# Patient Record
Sex: FEMALE | Race: WHITE | NOT HISPANIC OR LATINO | Employment: OTHER | URBAN - METROPOLITAN AREA
[De-identification: names, ages, dates, MRNs, and addresses within clinical notes are randomized per-mention and may not be internally consistent; named-entity substitution may affect disease eponyms.]

---

## 2024-06-09 ENCOUNTER — HOSPITAL ENCOUNTER (EMERGENCY)
Facility: HOSPITAL | Age: 69
Discharge: HOME | End: 2024-06-09
Payer: COMMERCIAL

## 2024-06-09 VITALS
HEART RATE: 89 BPM | OXYGEN SATURATION: 98 % | WEIGHT: 166 LBS | HEIGHT: 66 IN | SYSTOLIC BLOOD PRESSURE: 166 MMHG | DIASTOLIC BLOOD PRESSURE: 96 MMHG | RESPIRATION RATE: 18 BRPM | BODY MASS INDEX: 26.68 KG/M2

## 2024-06-09 DIAGNOSIS — K04.7 DENTAL INFECTION: ICD-10-CM

## 2024-06-09 DIAGNOSIS — K02.9 PAIN DUE TO DENTAL CARIES: Primary | ICD-10-CM

## 2024-06-09 PROCEDURE — 2500000001 HC RX 250 WO HCPCS SELF ADMINISTERED DRUGS (ALT 637 FOR MEDICARE OP)

## 2024-06-09 PROCEDURE — 99283 EMERGENCY DEPT VISIT LOW MDM: CPT

## 2024-06-09 RX ORDER — AMOXICILLIN AND CLAVULANATE POTASSIUM 875; 125 MG/1; MG/1
1 TABLET, FILM COATED ORAL EVERY 12 HOURS
Qty: 14 TABLET | Refills: 0 | Status: SHIPPED | OUTPATIENT
Start: 2024-06-09 | End: 2024-06-16

## 2024-06-09 RX ORDER — OMEPRAZOLE 40 MG/1
40 CAPSULE, DELAYED RELEASE ORAL
COMMUNITY

## 2024-06-09 RX ORDER — ACETAMINOPHEN 325 MG/1
975 TABLET ORAL ONCE
Status: COMPLETED | OUTPATIENT
Start: 2024-06-09 | End: 2024-06-09

## 2024-06-09 RX ORDER — AMLODIPINE BESYLATE 5 MG/1
TABLET ORAL DAILY
COMMUNITY

## 2024-06-09 RX ORDER — LETROZOLE 2.5 MG/1
2.5 TABLET, FILM COATED ORAL DAILY
COMMUNITY

## 2024-06-09 RX ORDER — LIDOCAINE HYDROCHLORIDE 20 MG/ML
1.25 SOLUTION OROPHARYNGEAL ONCE
Status: COMPLETED | OUTPATIENT
Start: 2024-06-09 | End: 2024-06-09

## 2024-06-09 RX ADMIN — ACETAMINOPHEN 975 MG: 325 TABLET ORAL at 08:17

## 2024-06-09 RX ADMIN — LIDOCAINE HYDROCHLORIDE 1.25 ML: 20 SOLUTION ORAL at 08:17

## 2024-06-09 ASSESSMENT — PAIN SCALES - PAIN ASSESSMENT IN ADVANCED DEMENTIA (PAINAD)
NEGVOCALIZATION: OCCASIONAL MOAN/GROAN, LOW SPEECH, NEGATIVE/DISAPPROVING QUALITY
BREATHING: NORMAL
TOTALSCORE: 1
FACIALEXPRESSION: SMILING OR INEXPRESSIVE
CONSOLABILITY: NO NEED TO CONSOLE
BODYLANGUAGE: RELAXED
TOTALSCORE: MEDICATION (SEE MAR)

## 2024-06-09 ASSESSMENT — PAIN - FUNCTIONAL ASSESSMENT
PAIN_FUNCTIONAL_ASSESSMENT: 0-10
PAIN_FUNCTIONAL_ASSESSMENT: 0-10

## 2024-06-09 ASSESSMENT — LIFESTYLE VARIABLES
TOTAL SCORE: 0
EVER HAD A DRINK FIRST THING IN THE MORNING TO STEADY YOUR NERVES TO GET RID OF A HANGOVER: NO
HAVE PEOPLE ANNOYED YOU BY CRITICIZING YOUR DRINKING: NO
EVER FELT BAD OR GUILTY ABOUT YOUR DRINKING: NO
HAVE YOU EVER FELT YOU SHOULD CUT DOWN ON YOUR DRINKING: NO

## 2024-06-09 ASSESSMENT — PAIN SCALES - WONG BAKER: WONGBAKER_NUMERICALRESPONSE: HURTS EVEN MORE

## 2024-06-09 ASSESSMENT — PAIN SCALES - GENERAL
PAINLEVEL_OUTOF10: 8
PAINLEVEL_OUTOF10: 8

## 2024-06-09 ASSESSMENT — PAIN DESCRIPTION - LOCATION
LOCATION: TEETH
LOCATION: TEETH

## 2024-06-09 ASSESSMENT — PAIN DESCRIPTION - ORIENTATION: ORIENTATION: LEFT

## 2024-06-09 ASSESSMENT — COLUMBIA-SUICIDE SEVERITY RATING SCALE - C-SSRS
1. IN THE PAST MONTH, HAVE YOU WISHED YOU WERE DEAD OR WISHED YOU COULD GO TO SLEEP AND NOT WAKE UP?: NO
6. HAVE YOU EVER DONE ANYTHING, STARTED TO DO ANYTHING, OR PREPARED TO DO ANYTHING TO END YOUR LIFE?: NO
2. HAVE YOU ACTUALLY HAD ANY THOUGHTS OF KILLING YOURSELF?: NO

## 2024-06-09 ASSESSMENT — PAIN DESCRIPTION - PAIN TYPE: TYPE: ACUTE PAIN

## 2024-06-09 ASSESSMENT — PAIN DESCRIPTION - DESCRIPTORS: DESCRIPTORS: ACHING;THROBBING

## 2024-06-09 NOTE — DISCHARGE INSTRUCTIONS
Please take Augmentin twice daily for 7 days for treatment of your dental infection.  Definitive management can be pursued by following up with a dentist within 72 hours for treatment of your dental caries.  You may rotate Tylenol and ibuprofen for symptom relief.  Be sure to brush her teeth twice daily and avoid sugary foods and drinks to avoid tooth breakdown.  Present back to ER for any new onset or worsening of symptoms despite treatment.

## 2024-06-09 NOTE — ED PROVIDER NOTES
HPI   Chief Complaint   Patient presents with    Dental Pain       HPI  69-year-old female presenting for evaluation of dental pain that onset 2 days ago.  Patient states she woke up yesterday morning with excruciating left-sided tooth pain that was radiating down into her jaw.  States that the tooth is very sensitive to touch.  Denies fever or chills.  Denies difficulty swallowing.  Denies sore throat.  States that she is trying to get into a dentist to get dentures but has not seen one yet.  Has no other acute complaints at this time.                  Marck Coma Scale Score: 15                     Patient History   Past Medical History:   Diagnosis Date    Breast cancer (Multi)     2020 right breast    Colon cancer (Multi)     GERD (gastroesophageal reflux disease)     HTN (hypertension)      Past Surgical History:   Procedure Laterality Date    TOE SURGERY Left      No family history on file.  Social History     Tobacco Use    Smoking status: Former     Types: Cigarettes    Smokeless tobacco: Current   Vaping Use    Vaping status: Every Day    Devices: Disposable, Refillable tank   Substance Use Topics    Alcohol use: Not on file    Drug use: Never       Physical Exam   ED Triage Vitals [06/09/24 0754]   Temp Heart Rate Respirations BP   -- 89 18 (!) 166/96      Pulse Ox Temp src Heart Rate Source Patient Position   98 % -- -- --      BP Location FiO2 (%)     -- --       Physical Exam  Vitals and nursing note reviewed.   Constitutional:       General: She is not in acute distress.     Appearance: She is well-developed.   HENT:      Head: Normocephalic and atraumatic.      Mouth/Throat:      Comments: Poor dentition.  There is minimal erythema at the base of the gums however there is tenderness to percussion of the 22nd tooth with evidence of erosion at the base of the tooth.  No evidence of abscess.  Patient is tolerating oral secretions well. No fluctuant masses, no trismus, no drooling, no peritonsillar  abscess, no uvula shift, no tripod position. No facial, tongue, or sublingual edema. No tenderness of the floor of the mouth. No trismus.    Eyes:      Conjunctiva/sclera: Conjunctivae normal.   Cardiovascular:      Rate and Rhythm: Normal rate and regular rhythm.      Heart sounds: No murmur heard.  Pulmonary:      Effort: Pulmonary effort is normal. No respiratory distress.      Breath sounds: Normal breath sounds.   Abdominal:      Palpations: Abdomen is soft.      Tenderness: There is no abdominal tenderness.   Musculoskeletal:         General: No swelling.      Cervical back: Neck supple.   Skin:     General: Skin is warm and dry.      Capillary Refill: Capillary refill takes less than 2 seconds.   Neurological:      Mental Status: She is alert.   Psychiatric:         Mood and Affect: Mood normal.         ED Course & MDM   Diagnoses as of 06/09/24 0822   Pain due to dental caries   Dental infection       Medical Decision Making  Parts of this chart have been completed using voice recognition software. Please excuse any errors of transcription.  My thought process and reason for plan has been formulated from the time that I saw the patient until the time of disposition and is not specific to one specific moment during their visit and furthermore my MDM encompasses this entire chart and not only this text box.      HPI: Detailed above.    Exam: A medically appropriate exam performed, outlined above, given the known history and presentation.    History obtained from: Patient    Social Determinants of Health considered during this visit: Lives independently    Medications given during visit:  Medications   lidocaine (Xylocaine) 2 % mouth solution 1.25 mL (1.25 mL Mouth/Throat Given 6/9/24 0817)   acetaminophen (Tylenol) tablet 975 mg (975 mg oral Given 6/9/24 0817)        Diagnostic/tests  Labs Reviewed - No data to display   No orders to display        Considerations/further MDM:  69-year-old female presenting for  evaluation of dental pain for the past 2 days.  On exam the patient does have evidence of poor dentition with erosion to the base of the 22nd tooth and tenderness to palpation with minimal erythema of the area.  Provided Xylocaine and Tylenol for symptom relief.  Given concern for dental infection was provided 7-day p.o. course of Augmentin twice daily for treatment and was instructed to follow-up with a dentist for definitive management of her dental caries.  Instructed to take Tylenol and ibuprofen on rotation for symptom relief.  Patient agreeable with plan of care and states understanding.  I have low suspicion for Daryl angina, peritonsillar abscess, retropharyngeal abscess. I discussed the diagnosis with the patient at bedside. Patient's questions and concerns were addressed. Patient was released in good condition, discharged with instructions to follow up with primary care provider and appropriate specialist, and to return to ED at any time for worsening symptoms or any other concerns. Patient demonstrates understanding of the findings and the importance of appropriate follow up care.        Procedure  Procedures     Angie Fernandes PA-C  06/09/24 0818

## 2024-07-10 ENCOUNTER — OFFICE VISIT (OUTPATIENT)
Dept: HEMATOLOGY/ONCOLOGY | Facility: CLINIC | Age: 69
End: 2024-07-10
Payer: COMMERCIAL

## 2024-07-10 VITALS
OXYGEN SATURATION: 100 % | DIASTOLIC BLOOD PRESSURE: 87 MMHG | HEIGHT: 66 IN | RESPIRATION RATE: 18 BRPM | SYSTOLIC BLOOD PRESSURE: 150 MMHG | BODY MASS INDEX: 25.69 KG/M2 | WEIGHT: 159.83 LBS | HEART RATE: 78 BPM | TEMPERATURE: 97.7 F

## 2024-07-10 DIAGNOSIS — C78.7 METASTATIC COLON CANCER TO LIVER (MULTI): Primary | ICD-10-CM

## 2024-07-10 DIAGNOSIS — C18.9 COLON CANCER (MULTI): ICD-10-CM

## 2024-07-10 DIAGNOSIS — C18.9 MALIGNANT NEOPLASM OF COLON, UNSPECIFIED PART OF COLON (MULTI): ICD-10-CM

## 2024-07-10 DIAGNOSIS — C18.9 METASTATIC COLON CANCER TO LIVER (MULTI): Primary | ICD-10-CM

## 2024-07-10 DIAGNOSIS — G89.3 CANCER ASSOCIATED PAIN: ICD-10-CM

## 2024-07-10 DIAGNOSIS — K59.00 CONSTIPATION, UNSPECIFIED CONSTIPATION TYPE: ICD-10-CM

## 2024-07-10 PROCEDURE — 1125F AMNT PAIN NOTED PAIN PRSNT: CPT | Performed by: STUDENT IN AN ORGANIZED HEALTH CARE EDUCATION/TRAINING PROGRAM

## 2024-07-10 PROCEDURE — 99215 OFFICE O/P EST HI 40 MIN: CPT | Performed by: STUDENT IN AN ORGANIZED HEALTH CARE EDUCATION/TRAINING PROGRAM

## 2024-07-10 PROCEDURE — 1159F MED LIST DOCD IN RCRD: CPT | Performed by: STUDENT IN AN ORGANIZED HEALTH CARE EDUCATION/TRAINING PROGRAM

## 2024-07-10 PROCEDURE — 99205 OFFICE O/P NEW HI 60 MIN: CPT | Performed by: STUDENT IN AN ORGANIZED HEALTH CARE EDUCATION/TRAINING PROGRAM

## 2024-07-10 RX ORDER — VIT C/E/ZN/COPPR/LUTEIN/ZEAXAN 250MG-90MG
1 CAPSULE ORAL DAILY
COMMUNITY
Start: 2024-04-10

## 2024-07-10 RX ORDER — AMLODIPINE BESYLATE 5 MG/1
1 TABLET ORAL DAILY
COMMUNITY

## 2024-07-10 RX ORDER — ALLOPURINOL 100 MG/1
300 TABLET ORAL 2 TIMES DAILY
COMMUNITY
Start: 2024-05-28

## 2024-07-10 RX ORDER — OXYCODONE HYDROCHLORIDE 5 MG/1
5 TABLET ORAL EVERY 6 HOURS PRN
Qty: 30 TABLET | Refills: 0 | Status: SHIPPED | OUTPATIENT
Start: 2024-07-10

## 2024-07-10 RX ORDER — ATORVASTATIN CALCIUM 20 MG/1
1 TABLET, FILM COATED ORAL DAILY
COMMUNITY
Start: 2024-02-26

## 2024-07-10 RX ORDER — IBUPROFEN 800 MG/1
1 TABLET ORAL EVERY 8 HOURS PRN
COMMUNITY
Start: 2024-04-28 | End: 2025-04-28

## 2024-07-10 RX ORDER — LEFLUNOMIDE 10 MG/1
TABLET ORAL
COMMUNITY
Start: 2024-01-11

## 2024-07-10 ASSESSMENT — COLUMBIA-SUICIDE SEVERITY RATING SCALE - C-SSRS
1. IN THE PAST MONTH, HAVE YOU WISHED YOU WERE DEAD OR WISHED YOU COULD GO TO SLEEP AND NOT WAKE UP?: NO
2. HAVE YOU ACTUALLY HAD ANY THOUGHTS OF KILLING YOURSELF?: NO
6. HAVE YOU EVER DONE ANYTHING, STARTED TO DO ANYTHING, OR PREPARED TO DO ANYTHING TO END YOUR LIFE?: NO

## 2024-07-10 ASSESSMENT — ENCOUNTER SYMPTOMS
DEPRESSION: 0
OCCASIONAL FEELINGS OF UNSTEADINESS: 0
LOSS OF SENSATION IN FEET: 0

## 2024-07-10 ASSESSMENT — PATIENT HEALTH QUESTIONNAIRE - PHQ9
SUM OF ALL RESPONSES TO PHQ9 QUESTIONS 1 AND 2: 0
1. LITTLE INTEREST OR PLEASURE IN DOING THINGS: NOT AT ALL
2. FEELING DOWN, DEPRESSED OR HOPELESS: NOT AT ALL

## 2024-07-10 ASSESSMENT — PAIN SCALES - GENERAL: PAINLEVEL: 7

## 2024-07-10 NOTE — PROGRESS NOTES
Patient ambulated into clinic for new patient visit accompanied by her cousin Tylre. Medications and allergies reviewed.     Patient is here for second opinion. Patient sees Dr. Blake at [facility].    Complaints of right rib/shoulder pain- taking ibuprofen for it once every couple of days. Can not sleep on right side due to the pain.     PACS imagining request sent to radiology for images to be uploaded.     Possible candidate for clinical trial dependent on genetic test results. Referral to be sent to trials team by Dr. Hu. Clinical trials team to contact patient.     IR orders for port placement- patient prefers [facility] location.

## 2024-07-10 NOTE — PROGRESS NOTES
[facility]  GI Medical Oncology Clinic  Consult Outpatient Visit    Patient Name: Elana oDdd  MRN: [id number]  Date of Service: 7/10/24  PCP: Rafat Mcclendon MD  Primary Oncologist: Dr. Riccardo Blake, [facility]    Diagnosis: recurrent colon adenocarcinoma with metastatic disease to the liver   MMR: proficient  NGS: ordered at [facility] and pending, HER2 negative   CEA: 44.2 (6/3/24)   CA 19-9: 178 (6/3/24)     Reason for Consult: second opinion   Consult requested by: self referral      HPI:    Ms. 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     She was previously diagnosed and treated for her breast and colon cancers in [place].     6/23/2020 - PET/CT - hypermetabolic mass in sigmoid colon & hypermet mass in R breast     7/6/2020 - sigmoid colon resection - pathology: Moderately differentiated invasive adenocarcinoma, 5.5 cm in diameter. Invades through muscularis propria focally into subserosal tissue. Marked adhesion of small bowel segments to colon without involvement of malignancy, +0/28 LN, negative margins, small focus of carcinoma in the serosa of margin furthest from this tumor (pT3 N0), at least stage IIA    8/12/2020 - BL mammogram & US: 3.6 cm mass in R breast    8/20/2020 - R breast biopsy - path: Invasive poorly differentiated ductal carcinoma. ER positive and AK  positive and HER-2 by IHC is 1+, but by HER-2 AMARIS is positive. On MammaPrint testing malignancy is ER and CA positive and HER-2 positive, high risk, with a predicted 5-year metastasis free survival of 93% with chemotherapy and hormonal therapy versus 71% metastasis free survival with hormonal therapy alone.     10/15/2020 - Completed pelvic radiation     11/5/2020 - R lumpectomy and R SLNBx - path: 2.6 cm invasive ductal carcinoma, with all margins free of malignancy. The malignancy comes to within 1 mm of the closest deep margin. Perineural invasion is present. There is a focus of metastatic carcinoma, measuring 0.5 mm in 1 of 2 sentinel lymph nodes. pT2 pN1mic. Stage IIB    1/8/2021 - CT CAP - RLQ colostomy in tact, tiny nodules LLL nonspecific    5/11/2021 - CT CAP - stable small pulm nodules, worsening ventral hernia, interval takedown of LLQ ostomy, no evidence of met disease     Transitioned her care to her PCP where she was being prescribed letrozole. Has not had repeat mammogram or colonoscopy.    4/10/24 - Re-established care with medical oncology with Dr. Riccardo Blake at [facility]. Had not been seen by medical oncology since August 2021. Letrozole had been refilled by PCP. CT CAP, colonoscopy, CEA, CA 19-9, CA 27.29 were all ordered.     6/18/24 - CT A/P w contrast - large hepatic mass (7.5x8.7cm) involving both anterior segment of R lobe as well as medial segment of L lobe likely related to metastatic disease possibly colon cancer however met breast CA not excluded w hx; enlarged mohit hepatic LN likely metastatic, probably stable sidebranch IPMN and in pancreas   CT chest non con - stable tiny nodule superolateral LLL (3mm)    6/24/24 - Dr. Blake discussed imaging. Biopsy ordered.     7/1/24 - CT guided biopsy of liver lesion, path showed adenocarcinoma, metastatic from patient's known colonic primary, IHC +CK20, CDX2, SATB2 while negative for GATA3 and CK7, pMMR     Ms. Dodd is here today with her cousin,  Tyler, who she lives with for a second opinion. Ms. Dodd is interested in any possible clinical trials. Ms. Dodd used to live in [place]. Noticed over the past 6 months she has been not feeling well. R rib and R shoulder pain since December. Pain impacting her ability to sleep. Taking ibuprofen 800mg every few days. Eating okay. Having some constipation. Reports her Johnny coffee helps some but still an issue. She is a retired . Stopped regularly drinking at [employer] but occasionally has a cocktail or beer. Current smoker. Used to smoke cigarettes, switched to vaping (1 cartridge/week) about 8 years ago. She smoked cigars in [place]. No recreational drugs.     Older sister had mouth cancer. She has 2 daughters - one lives in [place] and getting  October 2024. Other lived in [place] but now lives nearby.       ROS:  10-pt ROS reviewed and negative except as mentioned above.     PMHx / FHx / PSHx: below was reviewed and is accurate  Past Medical History:   Diagnosis Date    Breast cancer (Multi)     2020 right breast    Colon cancer (Multi)     2020    GERD (gastroesophageal reflux disease)     HLD (hyperlipidemia)     HTN (hypertension)     Rheumatoid arthritis (Multi)      Past Surgical History:   Procedure Laterality Date    NEPHRECTOMY      1998, donated to sister    TOE SURGERY Left     3/2023    TOE SURGERY Right     4/2024     Family History   Problem Relation Name Age of Onset    Other (mouth cancer) Sister         Medications: below was reviewed and is accurate    Current Outpatient Medications:     allopurinol (Zyloprim) 100 mg tablet, Take 3 tablets (300 mg) by mouth 2 times a day., Disp: , Rfl:     amLODIPine (Norvasc) 5 mg tablet, Take 1 tablet (5 mg) by mouth once daily., Disp: , Rfl:     leflunomide (Arava) 10 mg tablet, Take by mouth once daily., Disp: , Rfl:     letrozole (Femara) 2.5 mg tablet, Take 1 tablet (2.5 mg total) by mouth once daily.  Take with or without food., Disp: , Rfl:      "atorvastatin (Lipitor) 20 mg tablet, Take 1 tablet (20 mg) by mouth once daily., Disp: , Rfl:     cholecalciferol (Vitamin D-3) 25 MCG (1000 UT) capsule, Take 1 capsule (25 mcg) by mouth once daily., Disp: , Rfl:     ibuprofen 800 mg tablet, Take 1 tablet (800 mg) by mouth every 8 hours if needed., Disp: , Rfl:     omeprazole (PriLOSEC) 40 mg DR capsule, Take 1 capsule (40 mg) by mouth. Do not crush or chew., Disp: , Rfl:     oxyCODONE (Roxicodone) 5 mg immediate release tablet, Take 1 tablet (5 mg) by mouth every 6 hours if needed for moderate pain (4 - 6) or severe pain (7 - 10)., Disp: 30 tablet, Rfl: 0    Physical exam:  Vitals:    07/10/24 1100   BP: 150/87   BP Location: Right arm   Patient Position: Sitting   BP Cuff Size: Adult long   Pulse: 78   Resp: 18   Temp: 36.5 °C (97.7 °F)   TempSrc: Temporal   SpO2: 100%   Weight: 72.5 kg (159 lb 13.3 oz)   Height: (S) 1.679 m (5' 6.1\")   ECOG 1  GEN: NAD   HEENT: AT/NC, EOMI intact  LUNGS: normal respiratory effort  EXT: No deformities or edema  NEURO: Grossly intact. No focal deficits.  HEME: No signs of easy bruising or active bleeding.  PSYCH: Appropriate mood and affect    Radiology review:    Images not available in PACS, only reports to review     Pathology review:    Reviewed report     Laboratory review:    Reviewed 6/25/24 labs     ASSESSMENT AND PLAN:     Ms. 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     Reviewed imaging and biopsy results with Ms. Dodd and Tyler which are consistent with recurrent metastatic colon cancer to the liver. There are also enlarged mohit hepatic LN that are likely metastatic. LLL nodule is stable and small compared to 1/26/2022 CTA chest. I explained that her cancer is metastatic and unfortunately incurable. Goals of therapy include treating to control the cancer and cancer-associated symptoms while also maintaining quality of life.     Ms. Dodd is interested in clinical trials. We have a first-line metastatic CRC study for KRAS-mutated CRC ([id number]). I explained that this study has the same backbone chemotherapy which is standard of care for CRC with FOLFOX/FOLFIRI + felice +/- onvansertib (PLK1 inhibitor). She is very interested in this study. Dr. Blake has already sent [facility] testing from what I can tell in the chart. I will contact our clinical trial team to begin screening her for this study. Study also provides KRAS testing if needed.     I also reviewed the standard of care for metastatic CRC of FOLFOX/FOLFIRI/FOLFOXIRI +/- felice or anti-EGFR antibody (pending KRAS status) if she is either not a candidate for the trial or does not have a KRAS mutation. She will need a port irrespective of clinical trial or standard of care. We will get this scheduled while finalizing treatment plan.     Ms. Dodd would really like to make her daughter's wedding which is in October. She will be gone October 8-13 for the wedding.     Metastatic colon cancer to the liver: Referral for port placement. Contact [id number] clinical trial team for screening. NGS pending as already ordered by Dr. Blake per chart review. Can reorder through trial if needed. I will reach out to her once I hear back from the trial team re candidacy.   Pain (R rib and shoulder): Likely referred from liver mass. Taking ibuprofen every few days which is  fine to continue until she starts chemo. Sending oxycodone 5mg q6h PRN for pain to pharmacy.   Constipation: Recommended taking senna and Miralax if taking oxycodone daily.     Felipa Hu MD  Staff, Gastrointestinal Medical Oncology  [facility]

## 2024-07-12 ENCOUNTER — TELEPHONE (OUTPATIENT)
Dept: HEMATOLOGY/ONCOLOGY | Facility: HOSPITAL | Age: 69
End: 2024-07-12
Payer: COMMERCIAL

## 2024-07-12 DIAGNOSIS — C18.9 METASTATIC COLON CANCER TO LIVER (MULTI): Primary | ICD-10-CM

## 2024-07-12 DIAGNOSIS — C78.7 METASTATIC COLON CANCER TO LIVER (MULTI): Primary | ICD-10-CM

## 2024-07-12 NOTE — TELEPHONE ENCOUNTER
Spoke with Ms. Dodd over the phone to update her that the clinical trial team for [id number] had informed me that her being on letrozole excludes her from the trial. She asked if she could stop this as she was told she only needed to be on it for 4 years. I shared that I think a breast oncologist should make that decision. I will see if I can get her in to see a breast oncologist within the next week to get an opinion.     She also still needs KRAS status on her tumor. She would like NGS done here at , so I have ordered Tempus xF which should result in 7-10 days. She will have this drawn Monday.     I favor her continuing letrozole until 1) her VENECIA status is known and that this trial would be an option for her and 2) that she is able to safely stop letrozole.    I am going to order chemotherapy now in case she doesn't have VENECIA mutation so that insurance can review and she can get started if she can't do the clinical trial.     Patient expressed understanding the plan. RICARDO Vega will reach out her Monday regarding timing to come for blood draw for Tempus xF.    Felipa Hu MD  Staff, Gastrointestinal Medical Oncology  [facility] [place]

## 2024-07-15 ENCOUNTER — LAB (OUTPATIENT)
Dept: LAB | Facility: CLINIC | Age: 69
End: 2024-07-15
Payer: COMMERCIAL

## 2024-07-16 ENCOUNTER — HOSPITAL ENCOUNTER (OUTPATIENT)
Dept: CARDIOLOGY | Facility: HOSPITAL | Age: 69
Discharge: HOME | End: 2024-07-16
Payer: COMMERCIAL

## 2024-07-16 VITALS
TEMPERATURE: 98.2 F | SYSTOLIC BLOOD PRESSURE: 129 MMHG | HEART RATE: 94 BPM | RESPIRATION RATE: 18 BRPM | DIASTOLIC BLOOD PRESSURE: 69 MMHG | OXYGEN SATURATION: 98 %

## 2024-07-16 DIAGNOSIS — C18.9 COLON CANCER (MULTI): ICD-10-CM

## 2024-07-16 LAB
ANION GAP SERPL CALC-SCNC: 13 MMOL/L
BUN SERPL-MCNC: 13 MG/DL (ref 8–25)
CALCIUM SERPL-MCNC: 9.4 MG/DL (ref 8.5–10.4)
CHLORIDE SERPL-SCNC: 101 MMOL/L (ref 97–107)
CO2 SERPL-SCNC: 24 MMOL/L (ref 24–31)
CREAT SERPL-MCNC: 0.8 MG/DL (ref 0.4–1.6)
EGFRCR SERPLBLD CKD-EPI 2021: 81 ML/MIN/1.73M*2
ERYTHROCYTE [DISTWIDTH] IN BLOOD BY AUTOMATED COUNT: 14.6 % (ref 11.5–14.5)
GLUCOSE SERPL-MCNC: 102 MG/DL (ref 65–99)
HCT VFR BLD AUTO: 36.1 % (ref 36–46)
HGB BLD-MCNC: 11.3 G/DL (ref 12–16)
MCH RBC QN AUTO: 29 PG (ref 26–34)
MCHC RBC AUTO-ENTMCNC: 31.3 G/DL (ref 32–36)
MCV RBC AUTO: 93 FL (ref 80–100)
NRBC BLD-RTO: 0 /100 WBCS (ref 0–0)
PLATELET # BLD AUTO: 362 X10*3/UL (ref 150–450)
POTASSIUM SERPL-SCNC: 3.9 MMOL/L (ref 3.4–5.1)
RBC # BLD AUTO: 3.89 X10*6/UL (ref 4–5.2)
SODIUM SERPL-SCNC: 138 MMOL/L (ref 133–145)
WBC # BLD AUTO: 7.1 X10*3/UL (ref 4.4–11.3)

## 2024-07-16 PROCEDURE — 7100000009 HC PHASE TWO TIME - INITIAL BASE CHARGE

## 2024-07-16 PROCEDURE — 36415 COLL VENOUS BLD VENIPUNCTURE: CPT | Performed by: RADIOLOGY

## 2024-07-16 PROCEDURE — 77001 FLUOROGUIDE FOR VEIN DEVICE: CPT

## 2024-07-16 PROCEDURE — 80048 BASIC METABOLIC PNL TOTAL CA: CPT | Performed by: RADIOLOGY

## 2024-07-16 PROCEDURE — C1769 GUIDE WIRE: HCPCS

## 2024-07-16 PROCEDURE — 2500000004 HC RX 250 GENERAL PHARMACY W/ HCPCS (ALT 636 FOR OP/ED): Performed by: RADIOLOGY

## 2024-07-16 PROCEDURE — 85027 COMPLETE CBC AUTOMATED: CPT | Performed by: RADIOLOGY

## 2024-07-16 PROCEDURE — 2500000004 HC RX 250 GENERAL PHARMACY W/ HCPCS (ALT 636 FOR OP/ED): Mod: JZ | Performed by: NURSE PRACTITIONER

## 2024-07-16 PROCEDURE — 2720000007 HC OR 272 NO HCPCS

## 2024-07-16 PROCEDURE — 2780000003 HC OR 278 NO HCPCS

## 2024-07-16 PROCEDURE — 99152 MOD SED SAME PHYS/QHP 5/>YRS: CPT

## 2024-07-16 PROCEDURE — 2500000005 HC RX 250 GENERAL PHARMACY W/O HCPCS: Performed by: RADIOLOGY

## 2024-07-16 PROCEDURE — C1788 PORT, INDWELLING, IMP: HCPCS

## 2024-07-16 PROCEDURE — 76937 US GUIDE VASCULAR ACCESS: CPT

## 2024-07-16 PROCEDURE — 36561 INSERT TUNNELED CV CATH: CPT

## 2024-07-16 PROCEDURE — 7100000010 HC PHASE TWO TIME - EACH INCREMENTAL 1 MINUTE

## 2024-07-16 RX ORDER — SODIUM CHLORIDE 9 MG/ML
100 INJECTION, SOLUTION INTRAVENOUS CONTINUOUS
Status: DISCONTINUED | OUTPATIENT
Start: 2024-07-16 | End: 2024-07-17 | Stop reason: HOSPADM

## 2024-07-16 RX ORDER — CEFAZOLIN SODIUM 2 G/100ML
2 INJECTION, SOLUTION INTRAVENOUS ONCE
Status: COMPLETED | OUTPATIENT
Start: 2024-07-16 | End: 2024-07-16

## 2024-07-16 RX ORDER — FENTANYL CITRATE 50 UG/ML
INJECTION, SOLUTION INTRAMUSCULAR; INTRAVENOUS AS NEEDED
Status: DISCONTINUED | OUTPATIENT
Start: 2024-07-16 | End: 2024-07-16 | Stop reason: HOSPADM

## 2024-07-16 RX ORDER — LIDOCAINE HYDROCHLORIDE 10 MG/ML
INJECTION, SOLUTION EPIDURAL; INFILTRATION; INTRACAUDAL; PERINEURAL AS NEEDED
Status: DISCONTINUED | OUTPATIENT
Start: 2024-07-16 | End: 2024-07-16 | Stop reason: HOSPADM

## 2024-07-16 RX ORDER — MIDAZOLAM HYDROCHLORIDE 1 MG/ML
INJECTION, SOLUTION INTRAMUSCULAR; INTRAVENOUS AS NEEDED
Status: DISCONTINUED | OUTPATIENT
Start: 2024-07-16 | End: 2024-07-16 | Stop reason: HOSPADM

## 2024-07-16 ASSESSMENT — PAIN SCALES - GENERAL
PAINLEVEL_OUTOF10: 0 - NO PAIN

## 2024-07-16 ASSESSMENT — ENCOUNTER SYMPTOMS
HEMATOLOGIC/LYMPHATIC NEGATIVE: 1
EYES NEGATIVE: 1
GASTROINTESTINAL NEGATIVE: 1
CONSTITUTIONAL NEGATIVE: 1
CARDIOVASCULAR NEGATIVE: 1
ENDOCRINE NEGATIVE: 1
RESPIRATORY NEGATIVE: 1
PSYCHIATRIC NEGATIVE: 1
MUSCULOSKELETAL NEGATIVE: 1
ALLERGIC/IMMUNOLOGIC NEGATIVE: 1
NEUROLOGICAL NEGATIVE: 1

## 2024-07-16 ASSESSMENT — PAIN - FUNCTIONAL ASSESSMENT
PAIN_FUNCTIONAL_ASSESSMENT: 0-10

## 2024-07-16 NOTE — DISCHARGE INSTRUCTIONS
What is a Central Venous Access Port? Patient and Family Education    What is a Central Venous Access Port?  A central venous access port is a small device made up of 2 parts:  ? The port, which is a hollow metal or plastic disk with a rubber center and  ? The tube (also called a catheter) that connects to the port. The catheter is  threaded through a vein that leads to your heart.  A doctor places the port under the skin in the chest near the collarbone, or in the arm. The port  feels like a small bump. Once your port site heals it should not hurt. A port provides easy  access to a vein. A port is useful if you need frequent intravenous (IV) treatments, medicines,  blood tests or blood products. A port can stay in for months or years if it is cared for correctly  and working as it should. A port may also be called a Mediport, Power Port or Port-a-cath.    Before your Port is Placed:  ? If you take any medicine that thins your blood or helps prevent clots, talk with  your doctor. Before your port is placed, ask your doctor if your dose of these  medicines needs to be changed to help prevent bleeding problems. If your doctor tells  you to stop taking these medicines, ask him or her when you should restart them. If  your port placement is cancelled, call your doctor and ask if you need to restart your  medicine or change your dose. These medicines include, but are not limited to:  Warfarin (Coumadin), Lovenox (enoxaparin), Eliquis (apixaban), Plavix (clopidogrel),  Pradaxa (dabigatran) and Xarelto (rivaroxaban).  ? Do not eat or drink anything 8 hours before your port placement. If you have  been told to take certain medicines, take them with a sip of water.  ? Take your daily medicines, especially any cardiac (heart), high blood pressure, seizure,  and antibiotic medicines. If you take insulin for diabetes, ask your doctor how to adjust  your insulin dose the morning of your port placement. Be sure to tell your  doctor that  you have to fast for 8 hours before the port is placed.  ? Talk with your doctor, nurse or dietitian before taking probiotics. Ask if it’s safe for  you to take them when you have a central line. Some patients should avoid taking  certain probiotics because they may increase their chance of getting an infection.    The Day your Port is Placed:  ? A doctor in Radiology will place your port. The port placement takes about 60 to 90  minutes but plan on being here for 3 to 4 hours. This allows time for your check-in and  recovery.  ? A staff member will talk with you about the procedure, ask you questions and help  answer your questions. For women: Tell your doctor or technologist if there is any  chance you are pregnant.  ? You will be asked to change into a hospital gown for the procedure. You must remove  necklaces and earrings because they can get in the way during your port placement. An  IV will be placed in your arm and you will be asked to lay on a cart. Once we are  ready, we will take you to the procedure room. A family member may stay in our  waiting room while your port is placed.    In the Procedure Room:  You will get medicine through your IV to help you relax. While the port is placed, some  people fall asleep and some are awake but feel very relaxed. We will wash the area where the  port is being placed with a germ killing solution. This solution helps lower your chances of  getting an infection. Next, the doctor injects a numbing medicine into your skin, around the  port site. Once your skin is numb, the doctor makes 2 small incisions (cuts) to place the port  under your skin. The incisions are closed with skin glue or sutures and paper Band-Aids called  steri-strips. A gauze bandage is then placed over the port site.    After the Port is Placed:  ? You will be taken to the recovery area. We will check your pulse and blood pressure  often and check your port site for bleeding and swelling.  Some bruising is normal. If  you see bleeding, apply pressure with your fingertips and call your nurse right away. If  you feel swelling or more pain at the site, call your nurse.  ? You may have some soreness where your port is placed but it should improve each  day as the site heals. The incisions used to place the port are small and should heal in  10 to 14 days.  ? Once healed, you do not need to have a dressing over the site unless the port has a  needle in it.    If You go Home After Your Port is Placed:  ? You must have someone drive you home. We cannot let you drive or travel home alone in  a cab or on a bus. Do not drive for 24 hours after your port is placed.  ? Someone should stay with you through the night.  ? Resume your routine normal diet. You may resume your normal medicines but if you  take blood-thinning medicine, ask your doctor when you should start taking it again.  ? Rest until morning.   ? Lifting your arm on the same side of the mediport should be restricted to 10-15 pounds   or less for 1 week.  ? Avoid pushing, pulling, straining, or any strenuous activities.  ? You may climb stairs.  ? You may return to work when you feel you are ready at any point after surgery.  If you are not able to contact your doctor, go to the nearest Emergency Room.    Caring for Your Incisions:  ? Remove the gauze dressing after 48 hours. You do not need to replace it. Don’t try to peel  off the surgical glue or steri-strips. Let them fall off on their own, which often happens  after 2 weeks.  ? Do not let your incisions get wet until they are fully healed, which often takes 10 to 14  days. When you shower, cover your incisions with a dressing made from plastic wrap  (like Saran wrap or Glad Press n’ Seal) or a plastic bag and tape to keep the area dry.  After you shower, remove the plastic wrap and pat the incisions dry. Don’t swim or soak  in a tub or Jacuzzi until your incisions are fully healed.  ? Do not get  your port site wet if it has a needle in it. Follow the steps above to make  sure your port site is covered with plastic wrap or a plastic bag when you shower.  ? Look at your incisions each day. Call your doctor right away if you have any of the signs  of infection that are listed on the next page.    Caring for Your Port:  -A trained nurse must use a special non-coring needle, called a Hernandez needle, each time they  access your port. The needle is placed through your skin and into the rubber center of the port.  -You will likely feel slight pricking when your nurse inserts the needle. The needle stays in  place for your treatments and can be removed afterwards.  -Your port needs to be flushed every 4 to 6 weeks to help prevent blood clots  from forming in the catheter. If blood clots form and cause a blockage, your  port may need to be removed. Your nurse will flush your port with saline. If  needed, they may also flush it with a blood thinning medicine called heparin.  -Port flushes are done right before the needle is taken out. Some patients are  taught how to do these flushes at home. If you need to flush your port at home,  your nurse will show you how. If your port is not being used at least once  every 4 to 6 weeks, talk with your doctor or nurse about scheduling port  flushes.    Sedation:  If you received medication for sedation, it will be active in your body for approximately 24  hours. So you may feel a little sleepy. Therefore, for the next 24 hours:  ? DO NOT drive a car, operate machinery or power tools. It is recommended that a   responsible adult be with you for the first 24 hours.  ? DO NOT drink any alcoholic beverages or take any non-prescriptive medications that   contain alcohol.  ? DO NOT make any important decisions or sign any legal/important documents.    When to Call Your Doctor:  ? Redness, swelling or drainage near the port or over the port site.  ? Drainage from the port site.  ? Shake  or chills.  ? Temperature of 100.4°F (38°C) or higher.  ? Pain, warm or soreness at the port site.  ? Swelling of your arm, check at the port site.  ? Also call if the port has been moved  ? If you have any questions about your port.     How to Reach your Doctor:    Call [phone] with problems or questions    This info is a general resource. It is not meant to replace your health care provider’s advice.  Ask your doctor or health care team any questions. Always follow their instructions.

## 2024-07-16 NOTE — H&P
History Of Present Illness  Elana Dodd is a 69 y.o. female presenting with recurrent colon adenocarcinoma with metastatic disease to liver, here for mediport placement. She reports to start chemo treatment July 24th, PMH includes  HTN, RA, HLD, GERD, s/p R nephrectomy (1998), donated kidney to sister), R breast cancer (8/20/20, ER+/CA+/HER2 1+ by IHC/AMARIS+) s/p R lumpectomy and R SLN (11/5/2020) followed by carboplatin/Taxotere/Herceptin x4C (stopped due to SE) and R RT (completed 7/2021) on letrozole (since 7/2021), and stage IIA colon cancer s/p laparoscopic sigmoid colectomy and colostomy and en bloc resection with appendectomy and L ureterolysis (7/6/2020) with subsequent pelvic RT (completed 10/15/2020)     Past Medical History:  Past Medical History:   Diagnosis Date    Breast cancer (Multi)     2020 right breast    Colon cancer (Multi)     2020    GERD (gastroesophageal reflux disease)     HLD (hyperlipidemia)     HTN (hypertension)     Rheumatoid arthritis (Multi)         Past Surgical History:  Past Surgical History:   Procedure Laterality Date    NEPHRECTOMY      1998, donated to sister    TOE SURGERY Left     3/2023    TOE SURGERY Right     4/2024          Social History:   reports that she has quit smoking. Her smoking use included cigarettes. She has been exposed to tobacco smoke. She uses smokeless tobacco. She reports that she does not currently use alcohol. She reports that she does not use drugs.     Family History:  Family History   Problem Relation Name Age of Onset    Other (mouth cancer) Sister          Allergies:  No Known Allergies     Home Medications:  Current Outpatient Medications   Medication Instructions    allopurinol (ZYLOPRIM) 300 mg, oral, 2 times daily    amLODIPine (Norvasc) 5 mg tablet 1 tablet, oral, Daily    atorvastatin (Lipitor) 20 mg tablet 1 tablet, oral, Daily    cholecalciferol (Vitamin D-3) 25 MCG (1000 UT) capsule 1 capsule, oral, Daily    ibuprofen 800 mg tablet 1  tablet, oral, Every 8 hours PRN    leflunomide (Arava) 10 mg tablet oral, Daily RT    letrozole (FEMARA) 2.5 mg, oral, Daily, Take with or without food.    omeprazole (PRILOSEC) 40 mg, oral, Do not crush or chew.    oxyCODONE (ROXICODONE) 5 mg, oral, Every 6 hours PRN       Inpatient Medications:  Scheduled medications   Medication Dose Route Frequency    ceFAZolin  2 g intravenous Once     PRN medications   Medication     Continuous Medications   Medication Dose Last Rate    sodium chloride 0.9%  100 mL/hr           Review of Systems   Constitutional: Negative.    HENT: Negative.     Eyes: Negative.    Respiratory: Negative.     Cardiovascular: Negative.    Gastrointestinal: Negative.    Endocrine: Negative.    Genitourinary: Negative.    Musculoskeletal: Negative.    Skin: Negative.    Allergic/Immunologic: Negative.    Neurological: Negative.    Hematological: Negative.    Psychiatric/Behavioral: Negative.            Physical Exam  Constitutional:       General: She is awake. She is not in acute distress.     Appearance: She is not ill-appearing.   HENT:      Head: Normocephalic and atraumatic.   Cardiovascular:      Rate and Rhythm: Normal rate and regular rhythm.      Pulses:           Radial pulses are 2+ on the right side and 2+ on the left side.        Dorsalis pedis pulses are 2+ on the right side and 2+ on the left side.      Heart sounds: No murmur heard.  Pulmonary:      Effort: Pulmonary effort is normal.      Breath sounds: Normal breath sounds.   Abdominal:      General: Bowel sounds are normal.      Palpations: Abdomen is soft.   Musculoskeletal:      Right lower leg: No edema.      Left lower leg: No edema.   Skin:     General: Skin is warm and dry.   Neurological:      General: No focal deficit present.      Mental Status: She is alert and oriented to person, place, and time. Mental status is at baseline.      Cranial Nerves: Cranial nerves 2-12 are intact.   Psychiatric:         Mood and Affect:  "Mood and affect normal.        Sedation Plan    ASA 3     Mallampati class: III.         Last Recorded Vitals  Blood pressure 125/88, pulse 102, temperature 36.8 °C (98.2 °F), temperature source Oral, resp. rate 18, SpO2 97%.         Vitals from the Past 24 Hours  Heart Rate:  [102]   Temp:  [36.8 °C (98.2 °F)]   Resp:  [18]   BP: (125)/(88)   SpO2:  [97 %]          Relevant Results    Labs    CBC:   Recent Labs     07/16/24  1046   WBC 7.1   HGB 11.3*   HCT 36.1      MCV 93     BMP/CMP: No results for input(s): \"NA\", \"K\", \"CL\", \"BUN\", \"CREATININE\", \"CO2\", \"CALCIUM\", \"PROT\", \"BILITOT\", \"ALKPHOS\", \"ALT\", \"AST\", \"GLUCOSE\" in the last 21107 hours.    No lab exists for component: \"LABALBU\"   Lipid Panel: No results for input(s): \"CHOL\", \"HDL\", \"CHHDL\", \"LDL\", \"VLDL\", \"TRIG\", \"NHDL\" in the last 50368 hours.  Cardiac       No lab exists for component: \"CK\", \"CKMBP\"   Hemoglobin A1C:   Recent Labs     03/05/24  1000 11/22/23  1106 05/23/22  0906   HGBA1C 5.7* 5.2 5.4     TSH/ Free T4: No results for input(s): \"TSH\", \"FREET4\" in the last 29110 hours.  Iron: No results for input(s): \"FERRITIN\", \"TIBC\", \"IRONSAT\", \"BNP\" in the last 48098 hours.  Coag:     ABO: No results found for: \"ABO\"    Past Cardiology Tests (Last 3 Years):    EKG:  No results for input(s): \"ATRRATE\", \"VENTRATE\", \"PRINT\", \"QRSDUR\", \"QTCFRED\", \"QTCCALCB\" in the last 60039 hours.No results found for this or any previous visit (from the past 4464 hour(s)).  Echo:  Echocardiogram: No results found for this or any previous visit from the past 1800 days.    Ejection Fractions:  No results found for: \"EF\"  Cath:  Coronary Angiography: No results found for this or any previous visit from the past 1800 days.    Right Heart Cath: No results found for this or any previous visit from the past 1800 days.    Stress Test:  Nuclear:No results found for this or any previous visit from the past 1800 days.    Metabolic Stress: No results found for this or any " previous visit from the past 1800 days.    Cardiac Imaging:  Cardiac Scoring: No results found for this or any previous visit from the past 1800 days.    Cardiac MRI: No results found for this or any previous visit from the past 1800 days.         Assessment/Plan  Assessment/Plan   Active Problems:  There are no active Hospital Problems.      -recurrent colon cancer with metastatic disease to liver  -for mediport placement with Dr. Nielson 7/16/2024.          NP discussed with Dr. Nielson regarding plan of care/ discharge plan      I spent 30 minutes in the professional and overall care of this patient.      Silvia Peñaloza, APRN-CNP

## 2024-07-17 ENCOUNTER — OFFICE VISIT (OUTPATIENT)
Dept: HEMATOLOGY/ONCOLOGY | Facility: CLINIC | Age: 69
End: 2024-07-17
Payer: COMMERCIAL

## 2024-07-17 VITALS
RESPIRATION RATE: 18 BRPM | HEART RATE: 88 BPM | DIASTOLIC BLOOD PRESSURE: 88 MMHG | BODY MASS INDEX: 25.15 KG/M2 | SYSTOLIC BLOOD PRESSURE: 149 MMHG | TEMPERATURE: 97.7 F | OXYGEN SATURATION: 100 % | WEIGHT: 156.31 LBS

## 2024-07-17 DIAGNOSIS — C18.9 METASTATIC COLON CANCER TO LIVER (MULTI): Primary | ICD-10-CM

## 2024-07-17 DIAGNOSIS — C18.9 MALIGNANT NEOPLASM OF COLON, UNSPECIFIED PART OF COLON (MULTI): ICD-10-CM

## 2024-07-17 DIAGNOSIS — F41.9 ANXIETY: ICD-10-CM

## 2024-07-17 DIAGNOSIS — G89.3 CANCER ASSOCIATED PAIN: ICD-10-CM

## 2024-07-17 DIAGNOSIS — C78.7 METASTATIC COLON CANCER TO LIVER (MULTI): Primary | ICD-10-CM

## 2024-07-17 PROCEDURE — 1125F AMNT PAIN NOTED PAIN PRSNT: CPT | Performed by: STUDENT IN AN ORGANIZED HEALTH CARE EDUCATION/TRAINING PROGRAM

## 2024-07-17 PROCEDURE — 99214 OFFICE O/P EST MOD 30 MIN: CPT | Performed by: STUDENT IN AN ORGANIZED HEALTH CARE EDUCATION/TRAINING PROGRAM

## 2024-07-17 RX ORDER — LOPERAMIDE HYDROCHLORIDE 2 MG/1
CAPSULE ORAL
Qty: 30 CAPSULE | Refills: 2 | Status: SHIPPED | OUTPATIENT
Start: 2024-07-17

## 2024-07-17 RX ORDER — LIDOCAINE AND PRILOCAINE 25; 25 MG/G; MG/G
CREAM TOPICAL ONCE
Qty: 5 G | Refills: 3 | Status: SHIPPED | OUTPATIENT
Start: 2024-07-17 | End: 2024-07-17

## 2024-07-17 RX ORDER — PROCHLORPERAZINE MALEATE 10 MG
10 TABLET ORAL EVERY 6 HOURS PRN
Qty: 30 TABLET | Refills: 5 | Status: SHIPPED | OUTPATIENT
Start: 2024-07-17

## 2024-07-17 RX ORDER — OLANZAPINE 5 MG/1
5 TABLET ORAL NIGHTLY
Qty: 30 TABLET | Refills: 3 | Status: SHIPPED | OUTPATIENT
Start: 2024-07-17

## 2024-07-17 RX ORDER — ESCITALOPRAM OXALATE 10 MG/1
10 TABLET ORAL DAILY
Qty: 30 TABLET | Refills: 2 | Status: SHIPPED | OUTPATIENT
Start: 2024-07-17 | End: 2024-10-15

## 2024-07-17 RX ORDER — ONDANSETRON HYDROCHLORIDE 8 MG/1
8 TABLET, FILM COATED ORAL EVERY 8 HOURS PRN
Qty: 30 TABLET | Refills: 5 | Status: SHIPPED | OUTPATIENT
Start: 2024-07-17

## 2024-07-17 ASSESSMENT — PAIN SCALES - GENERAL: PAINLEVEL: 8

## 2024-07-17 NOTE — PROGRESS NOTES
[facility]   GI Medical Oncology Clinic  Follow-Up Patient Visit    Patient Name: Elana Dodd  MRN: [id number]  Date of Service: 7/17/24  PCP: Rafat Mcclendon MD    Diagnosis: recurrent colon adenocarcinoma with metastatic disease to the liver   MMR: proficient  NGS: Daina xF pending (drawn 7/15)  CEA: 44.2 (6/3/24)   CA 19-9: 178 (6/3/24)     Oncologic History:     Ms. Elana Dodd is a 70 yo F with PMH of HTN, RA (on leflunomide), HLD, GERD, s/p R nephrectomy (1998, donated kidney to sister), R breast cancer (8/20/20, ER+/OH+/HER2 1+ by IHC/AMARIS+) s/p R lumpectomy and R SLN (11/5/2020) followed by carboplatin/Taxotere/Herceptin x4C (stopped due to SE) and R RT (completed 7/2021) on letrozole (since 7/2021), and stage IIA colon cancer s/p laparoscopic sigmoid colectomy and colostomy and en bloc resection with appendectomy and L ureterolysis (7/6/2020) with subsequent pelvic RT (completed 10/15/2020) lost to follow-up found to have recurrent metastatic colon cancer to the liver.     She was previously diagnosed and treated for her breast and colon cancers in [place].      6/23/2020 - PET/CT - hypermetabolic mass in sigmoid colon & hypermet mass in R breast      7/6/2020 - sigmoid colon resection - pathology: Moderately differentiated invasive adenocarcinoma, 5.5 cm in diameter. Invades through muscularis propria focally into subserosal tissue. Marked adhesion of small bowel segments to colon without involvement of malignancy, +0/28 LN, negative margins, small focus of carcinoma in the serosa of margin furthest from this tumor (pT3 N0), at least stage IIA     8/12/2020 - BL mammogram & US: 3.6 cm mass in R breast     8/20/2020 - R breast biopsy - path: Invasive poorly differentiated ductal carcinoma. ER positive and OH positive and HER-2 by IHC is 1+, but by HER-2 AMARIS is positive. On MammaPrint testing malignancy is ER and OH positive and HER-2 positive, high risk, with a predicted 5-year metastasis  free survival of 93% with chemotherapy and hormonal therapy versus 71% metastasis free survival with hormonal therapy alone.      10/15/2020 - Completed pelvic radiation      11/5/2020 - R lumpectomy and R SLNBx - path: 2.6 cm invasive ductal carcinoma, with all margins free of malignancy. The malignancy comes to within 1 mm of the closest deep margin. Perineural invasion is present. There is a focus of metastatic carcinoma, measuring 0.5 mm in 1 of 2 sentinel lymph nodes. pT2 pN1mic. Stage IIB     1/8/2021 - CT CAP - RLQ colostomy in tact, tiny nodules LLL nonspecific     5/11/2021 - CT CAP - stable small pulm nodules, worsening ventral hernia, interval takedown of LLQ ostomy, no evidence of met disease      Transitioned her care to her PCP where she was being prescribed letrozole. Has not had repeat mammogram or colonoscopy.     4/10/24 - Re-established care with medical oncology with Dr. Riccardo Blake at [facility]. Had not been seen by medical oncology since August 2021. Letrozole had been refilled by PCP. CT CAP, colonoscopy, CEA, CA 19-9, CA 27.29 were all ordered.      6/18/24 - CT A/P w contrast - large hepatic mass (7.5x8.7cm) involving both anterior segment of R lobe as well as medial segment of L lobe likely related to metastatic disease possibly colon cancer however met breast CA not excluded w hx; enlarged mohit hepatic LN likely metastatic, probably stable sidebranch IPMN and in pancreas   CT chest non con - stable tiny nodule superolateral LLL (3mm)     6/24/24 - Dr. Blake discussed imaging. Biopsy ordered.      7/1/24 - CT guided biopsy of liver lesion, path showed adenocarcinoma, metastatic from patient's known colonic primary, IHC +CK20, CDX2, SATB2 while negative for GATA3 and CK7, pMMR       Interval History:    Ms. Dodd is here today by herself. Port placed yesterday. Having some stinging from port. She feels very worn out. Worried her cancer is progressing, anxious to start treatment. Taking  oxycodone at night for her R sided pain. Pain is much better controlled now. She is feeling really overwhelmed and scared. Did not have Tyler come with her today.  She does not want to do clinical trial anymore, would like to start treatment ASAP.     ROS:  10-pt ROS reviewed and negative except as mentioned above.     PMHx / FHx / PSHx: reviewed and are accurate    Medications: below was reviewed and is accurate    Current Outpatient Medications:     allopurinol (Zyloprim) 100 mg tablet, Take 3 tablets (300 mg) by mouth 2 times a day., Disp: , Rfl:     amLODIPine (Norvasc) 5 mg tablet, Take 1 tablet (5 mg) by mouth once daily., Disp: , Rfl:     atorvastatin (Lipitor) 20 mg tablet, Take 1 tablet (20 mg) by mouth once daily., Disp: , Rfl:     cholecalciferol (Vitamin D-3) 25 MCG (1000 UT) capsule, Take 1 capsule (25 mcg) by mouth once daily., Disp: , Rfl:     escitalopram (Lexapro) 10 mg tablet, Take 1 tablet (10 mg) by mouth once daily., Disp: 30 tablet, Rfl: 2    hydrOXYzine HCL (Atarax) 10 mg tablet, Take 1 tablet (10 mg) by mouth every 6 hours if needed for anxiety., Disp: 30 tablet, Rfl: 0    ibuprofen 800 mg tablet, Take 1 tablet (800 mg) by mouth every 8 hours if needed., Disp: , Rfl:     leflunomide (Arava) 10 mg tablet, Take by mouth once daily., Disp: , Rfl:     letrozole (Femara) 2.5 mg tablet, Take 1 tablet (2.5 mg total) by mouth once daily.  Take with or without food., Disp: , Rfl:     loperamide (Imodium) 2 mg capsule, Take 2 capsules (4 mg) by mouth with the first episode of diarrhea and 1 capsule (2 mg) by mouth with any additional episodes. Maximum 8 capsules (16 mg) per day., Disp: 30 capsule, Rfl: 2    OLANZapine (ZyPREXA) 5 mg tablet, Take 1 tablet (5 mg) by mouth once daily at bedtime. 1 tab at bedtime for 3 days, starting the night of chemo, Disp: 30 tablet, Rfl: 3    omeprazole (PriLOSEC) 40 mg DR capsule, Take 1 capsule (40 mg) by mouth. Do not crush or chew., Disp: , Rfl:     ondansetron  (Zofran) 8 mg tablet, Take 1 tablet (8 mg) by mouth every 8 hours if needed for nausea or vomiting., Disp: 30 tablet, Rfl: 5    oxyCODONE (Roxicodone) 5 mg immediate release tablet, Take 1 tablet (5 mg) by mouth every 6 hours if needed for moderate pain (4 - 6) or severe pain (7 - 10)., Disp: 30 tablet, Rfl: 0    prochlorperazine (Compazine) 10 mg tablet, Take 1 tablet (10 mg) by mouth every 6 hours if needed for nausea or vomiting., Disp: 30 tablet, Rfl: 5    Physical exam:  Vitals:    24 1046   BP: 149/88   BP Location: Left arm   Patient Position: Sitting   BP Cuff Size: Adult long   Pulse: 88   Resp: 18   Temp: 36.5 °C (97.7 °F)   TempSrc: Temporal   SpO2: 100%   Weight: 70.9 kg (156 lb 4.9 oz)   ECO  GEN: NAD, tearful   SKIN: skin color, texture, turgor normal, no suspicious rashes or lesions  LUNGS: normal respiratory effort  EXT: No deformities or edema  NEURO: Grossly intact. No focal deficits.  HEME: No signs of easy bruising or active bleeding.  PSYCH: Appropriate mood and affect    Laboratory review:    Tempus xF pending     ASSESSMENT AND PLAN:     Ms. Elana Dodd is a 70 yo F with PMH of HTN, RA (on leflunomide), HLD, GERD, s/p R nephrectomy (, donated kidney to sister), R breast cancer (20, ER+/DC+/HER2 1+ by IHC/AMARIS+) s/p R lumpectomy and R SLN (2020) followed by carboplatin/Taxotere/Herceptin x4C (stopped due to SE) and R RT (completed 2021) on letrozole (since 2021), and stage IIA colon cancer s/p laparoscopic sigmoid colectomy and colostomy and en bloc resection with appendectomy and L ureterolysis (2020) with subsequent pelvic RT (completed 10/15/2020) lost to follow-up found to have recurrent metastatic colon cancer to the liver after re-establishing at [facility] here today to discuss chemotherapy.     Metastatic colon cancer to the liver: Tempus xF pending. She's decided not to pursue clinical trial so plan to start chemotherapy next Wednesday. I will see her before  treatment but meeting today to discuss more in depth. Reviewed FOLFOXIRI in detail. She is unsure of infusion that long and feeling very anxious about tolerating treatment. After much discussion, plan to start with FOLFOX for cycle 1 and assess tolerability. Had issues with consent form. Will have her sign next week when she sees me before treatment. Sending antiemetic medications and EMLA cream to pharmacy.   Anxiety: Start Lexapro daily - takes 2-3 weeks to work, sent to pharmacy   Pain (R rib and shoulder): Controlled. Likely referred from liver mass. Continue oxycodone 5mg q6h PRN.   Constipation: Recommended taking senna and Miralax if taking oxycodone daily.     RTC in 1 week for C1 FOLFOX       Felipa Hu MD  Staff, Gastrointestinal Medical Oncology  [facility]

## 2024-07-17 NOTE — PROGRESS NOTES
Patient ambulated into clinic for follow up with Dr. Hu alone. Medications and allergies reviewed.     Patient stated she is having a lot of anxiety related to the length of time she will be here for her first dose of chemotherapy.     Red chemo bag, yellow fever card, disease binder and medication information given and reviewed with patient along with side effects.

## 2024-07-18 ENCOUNTER — TELEPHONE (OUTPATIENT)
Dept: HEMATOLOGY/ONCOLOGY | Facility: CLINIC | Age: 69
End: 2024-07-18
Payer: COMMERCIAL

## 2024-07-18 DIAGNOSIS — C78.7 METASTATIC COLON CANCER TO LIVER (MULTI): ICD-10-CM

## 2024-07-18 DIAGNOSIS — C18.9 METASTATIC COLON CANCER TO LIVER (MULTI): ICD-10-CM

## 2024-07-18 DIAGNOSIS — C18.9 MALIGNANT NEOPLASM OF COLON, UNSPECIFIED PART OF COLON (MULTI): ICD-10-CM

## 2024-07-18 DIAGNOSIS — F41.9 ANXIETY: Primary | ICD-10-CM

## 2024-07-18 RX ORDER — HYDROXYZINE HYDROCHLORIDE 10 MG/1
10 TABLET, FILM COATED ORAL EVERY 6 HOURS PRN
Qty: 30 TABLET | Refills: 0 | Status: SHIPPED | OUTPATIENT
Start: 2024-07-18

## 2024-07-18 NOTE — TELEPHONE ENCOUNTER
Elana wishes to shower and asked how to cover the port.  We discussed the process.  She also stated Dr. Hu gave her Lexapro for her anxiety.  She doesnot want to take a daily pill, just something PRN when her anxiety reaches a high level.  I explained that the Lexapro is also used for anxiety disorders and would be helpful throughout her treatment and not just on an as needed bases when her anxiety reaches a high level.  She will think about taking it but wanted me to let Dr. Hu know her feelings.  Dr. Hu notified.

## 2024-07-22 ENCOUNTER — TELEPHONE (OUTPATIENT)
Dept: HEMATOLOGY/ONCOLOGY | Facility: CLINIC | Age: 69
End: 2024-07-22
Payer: COMMERCIAL

## 2024-07-22 RX ORDER — HEPARIN SODIUM,PORCINE/PF 10 UNIT/ML
50 SYRINGE (ML) INTRAVENOUS AS NEEDED
Status: CANCELLED | OUTPATIENT
Start: 2024-07-24

## 2024-07-22 RX ORDER — HEPARIN 100 UNIT/ML
500 SYRINGE INTRAVENOUS AS NEEDED
Status: CANCELLED | OUTPATIENT
Start: 2024-07-24

## 2024-07-22 NOTE — TELEPHONE ENCOUNTER
I attempted to call patient numerous times last Thursday.  Spoke to Elana Olmos.  Instructed her that Dr. Hu ordered hydroxyzine PRN  for her anxiety.  Explained that I called her endlessly on Thursday but her phone was out of order.  She stated understanding.

## 2024-07-24 ENCOUNTER — HOSPITAL ENCOUNTER (OUTPATIENT)
Dept: RADIOLOGY | Facility: HOSPITAL | Age: 69
Discharge: HOME | End: 2024-07-24
Payer: COMMERCIAL

## 2024-07-24 ENCOUNTER — OFFICE VISIT (OUTPATIENT)
Dept: HEMATOLOGY/ONCOLOGY | Facility: CLINIC | Age: 69
End: 2024-07-24
Payer: COMMERCIAL

## 2024-07-24 ENCOUNTER — APPOINTMENT (OUTPATIENT)
Dept: HEMATOLOGY/ONCOLOGY | Facility: CLINIC | Age: 69
End: 2024-07-24
Payer: COMMERCIAL

## 2024-07-24 VITALS
BODY MASS INDEX: 25.22 KG/M2 | TEMPERATURE: 97.2 F | SYSTOLIC BLOOD PRESSURE: 140 MMHG | DIASTOLIC BLOOD PRESSURE: 88 MMHG | WEIGHT: 156.75 LBS | OXYGEN SATURATION: 98 % | RESPIRATION RATE: 16 BRPM | HEART RATE: 99 BPM

## 2024-07-24 DIAGNOSIS — C18.9 MALIGNANT NEOPLASM OF COLON, UNSPECIFIED PART OF COLON (MULTI): ICD-10-CM

## 2024-07-24 DIAGNOSIS — C18.9 METASTATIC COLON CANCER TO LIVER (MULTI): Primary | ICD-10-CM

## 2024-07-24 DIAGNOSIS — T80.212A LOCAL INFECTION DUE TO PORT-A-CATH, INITIAL ENCOUNTER: ICD-10-CM

## 2024-07-24 DIAGNOSIS — G89.3 CANCER RELATED PAIN: ICD-10-CM

## 2024-07-24 DIAGNOSIS — C78.7 METASTATIC COLON CANCER TO LIVER (MULTI): Primary | ICD-10-CM

## 2024-07-24 DIAGNOSIS — C18.9 COLON CANCER (MULTI): ICD-10-CM

## 2024-07-24 PROCEDURE — 99024 POSTOP FOLLOW-UP VISIT: CPT | Performed by: RADIOLOGY

## 2024-07-24 PROCEDURE — 1159F MED LIST DOCD IN RCRD: CPT | Performed by: STUDENT IN AN ORGANIZED HEALTH CARE EDUCATION/TRAINING PROGRAM

## 2024-07-24 PROCEDURE — 99213 OFFICE O/P EST LOW 20 MIN: CPT | Performed by: STUDENT IN AN ORGANIZED HEALTH CARE EDUCATION/TRAINING PROGRAM

## 2024-07-24 PROCEDURE — 36598 INJ W/FLUOR EVAL CV DEVICE: CPT

## 2024-07-24 PROCEDURE — 1125F AMNT PAIN NOTED PAIN PRSNT: CPT | Performed by: STUDENT IN AN ORGANIZED HEALTH CARE EDUCATION/TRAINING PROGRAM

## 2024-07-24 ASSESSMENT — PAIN SCALES - GENERAL: PAINLEVEL: 5

## 2024-07-24 NOTE — PROGRESS NOTES
Elana Dodd is a 69 y.o. female presenting for follow-up port placement. Small skin opening at right lower neck venotomy.    Subjective   No fever.    Objective   No fluctuance, erythema or fluid discharge.     Assessment/Plan   Site cleaned with CloroPrep and closed with Dermabond.  Keeep dry 2 days.  Keflex 500 mg QID 3 days.  Call IR office if develop redness, swelling, fever.      Chris Nielson MD

## 2024-07-24 NOTE — PROGRESS NOTES
Patient ambulated into clinic accompanied by her cousin Tyler for follow up with Dr. Hu and first treatment of Folfox. Medications and allergies reviewed.     Reviewed side effects of fluorouracil and oxaliplatin.     Treatment delayed due to opening of skin on right upper neck and port line is visible. Site is red, no drainage. Patient has complaints of burning and soreness around site. IR team at [facility] notifed. and patient to be going to be evaluated at noon on 7/24/24 by Dr. Boyd  who placed line on 7/16.     Follow up in one week with MD and first dose of Folfox.

## 2024-07-24 NOTE — PROGRESS NOTES
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 Elana Dodd is a 68 yo F with PMH of HTN, RA (on leflunomide), HLD, GERD, s/p R nephrectomy (1998, donated kidney to sister), R breast cancer (8/20/20, ER+/AZ+/HER2 1+ by IHC/AMARIS+) s/p R lumpectomy and R SLN (11/5/2020) followed by carboplatin/Taxotere/Herceptin x4C (stopped due to SE) and R RT (completed 7/2021) on letrozole (since 7/2021), and stage IIA colon cancer s/p laparoscopic sigmoid colectomy and colostomy and en bloc resection with appendectomy and L ureterolysis (7/6/2020) with subsequent pelvic RT (completed 10/15/2020) lost to follow-up found to have recurrent metastatic colon cancer to the liver.     She was previously diagnosed and treated for her breast and colon cancers in [place].      6/23/2020 - PET/CT - hypermetabolic mass in sigmoid colon & hypermet mass in R breast      7/6/2020 - sigmoid colon resection - pathology: Moderately differentiated invasive adenocarcinoma, 5.5 cm in diameter. Invades through muscularis propria focally into subserosal tissue. Marked adhesion of small bowel segments to colon without involvement of malignancy, +0/28 LN, negative margins, small focus of carcinoma in the serosa of margin furthest from this tumor (pT3 N0), at least stage IIA     8/12/2020 - BL mammogram & US: 3.6 cm mass in R breast     8/20/2020 - R breast biopsy - path: Invasive poorly differentiated ductal carcinoma. ER positive and AZ positive and HER-2 by IHC is 1+, but by HER-2 AMARIS is positive. On MammaPrint testing malignancy is ER and AZ positive and HER-2 positive, high  risk, with a predicted 5-year metastasis free survival of 93% with chemotherapy and hormonal therapy versus 71% metastasis free survival with hormonal therapy alone.      10/15/2020 - Completed pelvic radiation      11/5/2020 - R lumpectomy and R SLNBx - path: 2.6 cm invasive ductal carcinoma, with all margins free of malignancy. The malignancy comes to within 1 mm of the closest deep margin. Perineural invasion is present. There is a focus of metastatic carcinoma, measuring 0.5 mm in 1 of 2 sentinel lymph nodes. pT2 pN1mic. Stage IIB     1/8/2021 - CT CAP - RLQ colostomy in tact, tiny nodules LLL nonspecific     5/11/2021 - CT CAP - stable small pulm nodules, worsening ventral hernia, interval takedown of LLQ ostomy, no evidence of met disease      Transitioned her care to her PCP where she was being prescribed letrozole. Has not had repeat mammogram or colonoscopy.     4/10/24 - Re-established care with medical oncology with Dr. Riccardo Blake at [facility]. Had not been seen by medical oncology since August 2021. Letrozole had been refilled by PCP. CT CAP, colonoscopy, CEA, CA 19-9, CA 27.29 were all ordered.      6/18/24 - CT A/P w contrast - large hepatic mass (7.5x8.7cm) involving both anterior segment of R lobe as well as medial segment of L lobe likely related to metastatic disease possibly colon cancer however met breast CA not excluded w hx; enlarged mohit hepatic LN likely metastatic, probably stable sidebranch IPMN and in pancreas   CT chest non con - stable tiny nodule superolateral LLL (3mm)     6/24/24 - Dr. Blake discussed imaging. Biopsy ordered.      7/1/24 - CT guided biopsy of liver lesion, path showed adenocarcinoma, metastatic from patient's known colonic primary, IHC +CK20, CDX2, SATB2 while negative for GATA3 and CK7, pMMR       Interval History:    Ms. Dodd is here today with Margee to start treatment. Having intermittent anxiety. Does not want to take escitalopram I prescribed last week. Would  like something PRN. Atarax 10mg hasn't helped. Eating and drinking okay. Port site has been painful and bothering her.       ROS:  10-pt ROS reviewed and negative except as mentioned above.     PMHx / FHx / PSHx: reviewed and are accurate    Medications: below was reviewed and is accurate    Current Outpatient Medications:     allopurinol (Zyloprim) 100 mg tablet, Take 3 tablets (300 mg) by mouth 2 times a day., Disp: , Rfl:     amLODIPine (Norvasc) 5 mg tablet, Take 1 tablet (5 mg) by mouth once daily., Disp: , Rfl:     atorvastatin (Lipitor) 20 mg tablet, Take 1 tablet (20 mg) by mouth once daily., Disp: , Rfl:     cholecalciferol (Vitamin D-3) 25 MCG (1000 UT) capsule, Take 1 capsule (25 mcg) by mouth once daily., Disp: , Rfl:     hydrOXYzine HCL (Atarax) 10 mg tablet, Take 1 tablet (10 mg) by mouth every 6 hours if needed for anxiety., Disp: 30 tablet, Rfl: 0    leflunomide (Arava) 10 mg tablet, Take by mouth once daily., Disp: , Rfl:     OLANZapine (ZyPREXA) 5 mg tablet, Take 1 tablet (5 mg) by mouth once daily at bedtime. 1 tab at bedtime for 3 days, starting the night of chemo, Disp: 30 tablet, Rfl: 3    omeprazole (PriLOSEC) 40 mg DR capsule, Take 1 capsule (40 mg) by mouth. Do not crush or chew., Disp: , Rfl:     escitalopram (Lexapro) 10 mg tablet, Take 1 tablet (10 mg) by mouth once daily. (Patient not taking: Reported on 7/24/2024), Disp: 30 tablet, Rfl: 2    ibuprofen 800 mg tablet, Take 1 tablet (800 mg) by mouth every 8 hours if needed., Disp: , Rfl:     letrozole (Femara) 2.5 mg tablet, Take 1 tablet (2.5 mg total) by mouth once daily.  Take with or without food., Disp: , Rfl:     loperamide (Imodium) 2 mg capsule, Take 2 capsules (4 mg) by mouth with the first episode of diarrhea and 1 capsule (2 mg) by mouth with any additional episodes. Maximum 8 capsules (16 mg) per day. (Patient not taking: Reported on 7/24/2024), Disp: 30 capsule, Rfl: 2    ondansetron (Zofran) 8 mg tablet, Take 1 tablet (8 mg)  by mouth every 8 hours if needed for nausea or vomiting. (Patient not taking: Reported on 2024), Disp: 30 tablet, Rfl: 5    oxyCODONE (Roxicodone) 5 mg immediate release tablet, Take 1 tablet (5 mg) by mouth every 6 hours if needed for moderate pain (4 - 6) or severe pain (7 - 10). G89.3, Disp: 30 tablet, Rfl: 0    prochlorperazine (Compazine) 10 mg tablet, Take 1 tablet (10 mg) by mouth every 6 hours if needed for nausea or vomiting. (Patient not taking: Reported on 2024), Disp: 30 tablet, Rfl: 5    Physical exam:  Vitals:    24 0837   BP: 140/88   BP Location: Right arm   Patient Position: Sitting   BP Cuff Size: Adult long   Pulse: 99   Resp: 16   Temp: 36.2 °C (97.2 °F)   TempSrc: Temporal   SpO2: 98%   Weight: 71.1 kg (156 lb 12 oz)   ECO  GEN: NAD  SKIN: redness at site of right lower neck venotomy, able to see port line through opening at right lower nerck   LUNGS: normal respiratory effort  EXT: No deformities or edema  NEURO: Grossly intact. No focal deficits.  HEME: No signs of easy bruising or active bleeding.  PSYCH: Appropriate mood and affect    Laboratory review:    Tempus xF pending     ASSESSMENT AND PLAN:     Ms. Elana Dodd is a 68 yo F with PMH of HTN, RA (on leflunomide), HLD, GERD, s/p R nephrectomy (, donated kidney to sister), R breast cancer (20, ER+/CO+/HER2 1+ by IHC/AMARIS+) s/p R lumpectomy and R SLN (2020) followed by carboplatin/Taxotere/Herceptin x4C (stopped due to SE) and R RT (completed 2021) on letrozole (since 2021), and stage IIA colon cancer s/p laparoscopic sigmoid colectomy and colostomy and en bloc resection with appendectomy and L ureterolysis (2020) with subsequent pelvic RT (completed 10/15/2020) lost to follow-up found to have recurrent metastatic colon cancer to the liver here today for follow-up.     Skin opening at site of port line: IR was contacted about emergent evaluation of skin opening at venotomy site due to infection risk  and inability to treat with chemotherapy until addressed. She will be seen and evaluated today.   Metastatic colon cancer to the liver: Tempus xF pending. Start FOLFOX next week once port line issue has been addressed.   Anxiety: Increase Atarax to 20mg PRN  Pain (R rib and shoulder): Controlled. Likely referred from liver mass. Continue oxycodone 5mg q6h PRN.   Constipation: Recommended taking senna and Miralax if taking oxycodone daily.     RTC in 1 week to start C1 FOLFOX       Felipa Hu MD  Staff, Gastrointestinal Medical Oncology  [facility]

## 2024-07-26 ENCOUNTER — APPOINTMENT (OUTPATIENT)
Dept: HEMATOLOGY/ONCOLOGY | Facility: CLINIC | Age: 69
End: 2024-07-26
Payer: COMMERCIAL

## 2024-07-26 ENCOUNTER — TELEPHONE (OUTPATIENT)
Dept: HEMATOLOGY/ONCOLOGY | Facility: CLINIC | Age: 69
End: 2024-07-26
Payer: COMMERCIAL

## 2024-07-26 RX ORDER — OXYCODONE HYDROCHLORIDE 5 MG/1
5 TABLET ORAL EVERY 6 HOURS PRN
Qty: 30 TABLET | Refills: 0 | Status: SHIPPED | OUTPATIENT
Start: 2024-07-26

## 2024-07-30 ENCOUNTER — TELEPHONE (OUTPATIENT)
Dept: HEMATOLOGY/ONCOLOGY | Facility: CLINIC | Age: 69
End: 2024-07-30
Payer: COMMERCIAL

## 2024-07-30 NOTE — TELEPHONE ENCOUNTER
"Port was placed 1 month ago.  Had it \"glued\" on her neck incision last week by Dr. Nielson.  She called to say if feels\"not right\" when she turns her neck.  She feels pulling.  She also feels that her skin over her port disc is black and blue.  Denies fever, chills, drainage.  She will call IR and report this and also come in tomorrow as scheduled for her MD and infusion appt.  Instructed her to go to the ER if any of the listed symptoms occur.  She did a teachback.  "

## 2024-07-31 ENCOUNTER — INFUSION (OUTPATIENT)
Dept: HEMATOLOGY/ONCOLOGY | Facility: CLINIC | Age: 69
End: 2024-07-31
Payer: COMMERCIAL

## 2024-07-31 ENCOUNTER — OFFICE VISIT (OUTPATIENT)
Dept: HEMATOLOGY/ONCOLOGY | Facility: CLINIC | Age: 69
End: 2024-07-31
Payer: COMMERCIAL

## 2024-07-31 VITALS
WEIGHT: 152.34 LBS | BODY MASS INDEX: 24.51 KG/M2 | RESPIRATION RATE: 18 BRPM | OXYGEN SATURATION: 99 % | HEART RATE: 90 BPM | SYSTOLIC BLOOD PRESSURE: 158 MMHG | TEMPERATURE: 97.8 F | DIASTOLIC BLOOD PRESSURE: 88 MMHG

## 2024-07-31 DIAGNOSIS — C18.9 MALIGNANT NEOPLASM OF COLON, UNSPECIFIED PART OF COLON (MULTI): Primary | ICD-10-CM

## 2024-07-31 DIAGNOSIS — C78.7 METASTATIC COLON CANCER TO LIVER (MULTI): ICD-10-CM

## 2024-07-31 DIAGNOSIS — C18.9 METASTATIC COLON CANCER TO LIVER (MULTI): ICD-10-CM

## 2024-07-31 DIAGNOSIS — C18.9 MALIGNANT NEOPLASM OF COLON, UNSPECIFIED PART OF COLON (MULTI): ICD-10-CM

## 2024-07-31 LAB
ALBUMIN SERPL BCP-MCNC: 4.2 G/DL (ref 3.4–5)
ALP SERPL-CCNC: 357 U/L (ref 33–136)
ALT SERPL W P-5'-P-CCNC: 18 U/L (ref 7–45)
ANION GAP SERPL CALC-SCNC: 16 MMOL/L (ref 10–20)
AST SERPL W P-5'-P-CCNC: 43 U/L (ref 9–39)
BASOPHILS # BLD AUTO: 0.07 X10*3/UL (ref 0–0.1)
BASOPHILS NFR BLD AUTO: 0.8 %
BILIRUB SERPL-MCNC: 0.3 MG/DL (ref 0–1.2)
BUN SERPL-MCNC: 12 MG/DL (ref 6–23)
CALCIUM SERPL-MCNC: 9.8 MG/DL (ref 8.6–10.3)
CEA SERPL-MCNC: 67.8 UG/L
CHLORIDE SERPL-SCNC: 101 MMOL/L (ref 98–107)
CO2 SERPL-SCNC: 25 MMOL/L (ref 21–32)
CREAT SERPL-MCNC: 0.75 MG/DL (ref 0.5–1.05)
EGFRCR SERPLBLD CKD-EPI 2021: 87 ML/MIN/1.73M*2
EOSINOPHIL # BLD AUTO: 0.32 X10*3/UL (ref 0–0.7)
EOSINOPHIL NFR BLD AUTO: 3.8 %
ERYTHROCYTE [DISTWIDTH] IN BLOOD BY AUTOMATED COUNT: 14.6 % (ref 11.5–14.5)
GLUCOSE SERPL-MCNC: 106 MG/DL (ref 74–99)
HBV CORE AB SER QL: REACTIVE
HBV SURFACE AB SER-ACNC: 122.2 MIU/ML
HBV SURFACE AG SERPL QL IA: NONREACTIVE
HCT VFR BLD AUTO: 34.6 % (ref 36–46)
HGB BLD-MCNC: 10.9 G/DL (ref 12–16)
IMM GRANULOCYTES # BLD AUTO: 0.04 X10*3/UL (ref 0–0.7)
IMM GRANULOCYTES NFR BLD AUTO: 0.5 % (ref 0–0.9)
LYMPHOCYTES # BLD AUTO: 1.3 X10*3/UL (ref 1.2–4.8)
LYMPHOCYTES NFR BLD AUTO: 15.6 %
MCH RBC QN AUTO: 28.5 PG (ref 26–34)
MCHC RBC AUTO-ENTMCNC: 31.5 G/DL (ref 32–36)
MCV RBC AUTO: 90 FL (ref 80–100)
MONOCYTES # BLD AUTO: 0.75 X10*3/UL (ref 0.1–1)
MONOCYTES NFR BLD AUTO: 9 %
NEUTROPHILS # BLD AUTO: 5.84 X10*3/UL (ref 1.2–7.7)
NEUTROPHILS NFR BLD AUTO: 70.3 %
NRBC BLD-RTO: 0 /100 WBCS (ref 0–0)
PLATELET # BLD AUTO: 379 X10*3/UL (ref 150–450)
POTASSIUM SERPL-SCNC: 4.2 MMOL/L (ref 3.5–5.3)
PROT SERPL-MCNC: 7.9 G/DL (ref 6.4–8.2)
RBC # BLD AUTO: 3.83 X10*6/UL (ref 4–5.2)
SODIUM SERPL-SCNC: 138 MMOL/L (ref 136–145)
WBC # BLD AUTO: 8.3 X10*3/UL (ref 4.4–11.3)

## 2024-07-31 PROCEDURE — 80053 COMPREHEN METABOLIC PANEL: CPT

## 2024-07-31 PROCEDURE — 86706 HEP B SURFACE ANTIBODY: CPT

## 2024-07-31 PROCEDURE — 36593 DECLOT VASCULAR DEVICE: CPT

## 2024-07-31 PROCEDURE — 99215 OFFICE O/P EST HI 40 MIN: CPT | Performed by: STUDENT IN AN ORGANIZED HEALTH CARE EDUCATION/TRAINING PROGRAM

## 2024-07-31 PROCEDURE — 1159F MED LIST DOCD IN RCRD: CPT | Performed by: STUDENT IN AN ORGANIZED HEALTH CARE EDUCATION/TRAINING PROGRAM

## 2024-07-31 PROCEDURE — 96375 TX/PRO/DX INJ NEW DRUG ADDON: CPT | Mod: INF

## 2024-07-31 PROCEDURE — 2500000004 HC RX 250 GENERAL PHARMACY W/ HCPCS (ALT 636 FOR OP/ED): Performed by: STUDENT IN AN ORGANIZED HEALTH CARE EDUCATION/TRAINING PROGRAM

## 2024-07-31 PROCEDURE — 85025 COMPLETE CBC W/AUTO DIFF WBC: CPT

## 2024-07-31 PROCEDURE — 82378 CARCINOEMBRYONIC ANTIGEN: CPT

## 2024-07-31 PROCEDURE — 96411 CHEMO IV PUSH ADDL DRUG: CPT

## 2024-07-31 PROCEDURE — 96415 CHEMO IV INFUSION ADDL HR: CPT

## 2024-07-31 PROCEDURE — 1126F AMNT PAIN NOTED NONE PRSNT: CPT | Performed by: STUDENT IN AN ORGANIZED HEALTH CARE EDUCATION/TRAINING PROGRAM

## 2024-07-31 PROCEDURE — 86704 HEP B CORE ANTIBODY TOTAL: CPT

## 2024-07-31 PROCEDURE — 96413 CHEMO IV INFUSION 1 HR: CPT

## 2024-07-31 PROCEDURE — 87340 HEPATITIS B SURFACE AG IA: CPT

## 2024-07-31 PROCEDURE — 81232 DPYD GENE COMMON VARIANTS: CPT

## 2024-07-31 RX ORDER — ALBUTEROL SULFATE 0.83 MG/ML
3 SOLUTION RESPIRATORY (INHALATION) AS NEEDED
Status: DISCONTINUED | OUTPATIENT
Start: 2024-07-31 | End: 2024-07-31 | Stop reason: HOSPADM

## 2024-07-31 RX ORDER — PALONOSETRON 0.05 MG/ML
0.25 INJECTION, SOLUTION INTRAVENOUS ONCE
Status: CANCELLED | OUTPATIENT
Start: 2024-07-31

## 2024-07-31 RX ORDER — ALBUTEROL SULFATE 0.83 MG/ML
3 SOLUTION RESPIRATORY (INHALATION) AS NEEDED
Status: CANCELLED | OUTPATIENT
Start: 2024-07-31

## 2024-07-31 RX ORDER — PALONOSETRON 0.05 MG/ML
0.25 INJECTION, SOLUTION INTRAVENOUS ONCE
Status: COMPLETED | OUTPATIENT
Start: 2024-07-31 | End: 2024-07-31

## 2024-07-31 RX ORDER — FAMOTIDINE 10 MG/ML
20 INJECTION INTRAVENOUS ONCE AS NEEDED
OUTPATIENT
Start: 2024-08-02

## 2024-07-31 RX ORDER — PROCHLORPERAZINE MALEATE 10 MG
10 TABLET ORAL EVERY 6 HOURS PRN
Status: DISCONTINUED | OUTPATIENT
Start: 2024-07-31 | End: 2024-07-31 | Stop reason: HOSPADM

## 2024-07-31 RX ORDER — PROCHLORPERAZINE MALEATE 10 MG
10 TABLET ORAL EVERY 6 HOURS PRN
Status: CANCELLED | OUTPATIENT
Start: 2024-07-31

## 2024-07-31 RX ORDER — FAMOTIDINE 10 MG/ML
20 INJECTION INTRAVENOUS ONCE AS NEEDED
Status: DISCONTINUED | OUTPATIENT
Start: 2024-07-31 | End: 2024-07-31 | Stop reason: HOSPADM

## 2024-07-31 RX ORDER — LORAZEPAM 2 MG/ML
1 INJECTION INTRAMUSCULAR AS NEEDED
Status: CANCELLED | OUTPATIENT
Start: 2024-07-31

## 2024-07-31 RX ORDER — HEPARIN 100 UNIT/ML
500 SYRINGE INTRAVENOUS AS NEEDED
Status: CANCELLED | OUTPATIENT
Start: 2024-07-31

## 2024-07-31 RX ORDER — DIPHENHYDRAMINE HYDROCHLORIDE 50 MG/ML
50 INJECTION INTRAMUSCULAR; INTRAVENOUS AS NEEDED
Status: DISCONTINUED | OUTPATIENT
Start: 2024-07-31 | End: 2024-07-31 | Stop reason: HOSPADM

## 2024-07-31 RX ORDER — HEPARIN SODIUM,PORCINE/PF 10 UNIT/ML
50 SYRINGE (ML) INTRAVENOUS AS NEEDED
Status: CANCELLED | OUTPATIENT
Start: 2024-07-31

## 2024-07-31 RX ORDER — EPINEPHRINE 0.3 MG/.3ML
0.3 INJECTION SUBCUTANEOUS EVERY 5 MIN PRN
OUTPATIENT
Start: 2024-08-02

## 2024-07-31 RX ORDER — HEPARIN 100 UNIT/ML
500 SYRINGE INTRAVENOUS AS NEEDED
Status: DISCONTINUED | OUTPATIENT
Start: 2024-07-31 | End: 2024-07-31 | Stop reason: HOSPADM

## 2024-07-31 RX ORDER — PROCHLORPERAZINE EDISYLATE 5 MG/ML
10 INJECTION INTRAMUSCULAR; INTRAVENOUS EVERY 6 HOURS PRN
Status: DISCONTINUED | OUTPATIENT
Start: 2024-07-31 | End: 2024-07-31 | Stop reason: HOSPADM

## 2024-07-31 RX ORDER — FAMOTIDINE 10 MG/ML
20 INJECTION INTRAVENOUS ONCE AS NEEDED
Status: CANCELLED | OUTPATIENT
Start: 2024-07-31

## 2024-07-31 RX ORDER — PROCHLORPERAZINE EDISYLATE 5 MG/ML
10 INJECTION INTRAMUSCULAR; INTRAVENOUS EVERY 6 HOURS PRN
Status: CANCELLED | OUTPATIENT
Start: 2024-07-31

## 2024-07-31 RX ORDER — DIPHENHYDRAMINE HYDROCHLORIDE 50 MG/ML
50 INJECTION INTRAMUSCULAR; INTRAVENOUS AS NEEDED
OUTPATIENT
Start: 2024-08-02

## 2024-07-31 RX ORDER — EPINEPHRINE 0.3 MG/.3ML
0.3 INJECTION SUBCUTANEOUS EVERY 5 MIN PRN
Status: DISCONTINUED | OUTPATIENT
Start: 2024-07-31 | End: 2024-07-31 | Stop reason: HOSPADM

## 2024-07-31 RX ORDER — ALBUTEROL SULFATE 0.83 MG/ML
3 SOLUTION RESPIRATORY (INHALATION) AS NEEDED
OUTPATIENT
Start: 2024-08-02

## 2024-07-31 RX ORDER — EPINEPHRINE 0.3 MG/.3ML
0.3 INJECTION SUBCUTANEOUS EVERY 5 MIN PRN
Status: CANCELLED | OUTPATIENT
Start: 2024-07-31

## 2024-07-31 RX ORDER — DIPHENHYDRAMINE HYDROCHLORIDE 50 MG/ML
50 INJECTION INTRAMUSCULAR; INTRAVENOUS AS NEEDED
Status: CANCELLED | OUTPATIENT
Start: 2024-07-31

## 2024-07-31 RX ORDER — LORAZEPAM 2 MG/ML
1 INJECTION INTRAMUSCULAR AS NEEDED
Status: DISCONTINUED | OUTPATIENT
Start: 2024-07-31 | End: 2024-07-31 | Stop reason: HOSPADM

## 2024-07-31 RX ORDER — HEPARIN SODIUM,PORCINE/PF 10 UNIT/ML
50 SYRINGE (ML) INTRAVENOUS AS NEEDED
Status: DISCONTINUED | OUTPATIENT
Start: 2024-07-31 | End: 2024-07-31 | Stop reason: HOSPADM

## 2024-07-31 ASSESSMENT — PAIN SCALES - GENERAL: PAINLEVEL: 0-NO PAIN

## 2024-07-31 NOTE — PROGRESS NOTES
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 Elana Dodd is a 70 yo F with PMH of HTN, RA (on leflunomide), HLD, GERD, s/p R nephrectomy (1998, donated kidney to sister), R breast cancer (8/20/20, ER+/RI+/HER2 1+ by IHC/AMARIS+) s/p R lumpectomy and R SLN (11/5/2020) followed by carboplatin/Taxotere/Herceptin x4C (stopped due to SE) and R RT (completed 7/2021) on letrozole (since 7/2021), and stage IIA colon cancer s/p laparoscopic sigmoid colectomy and colostomy and en bloc resection with appendectomy and L ureterolysis (7/6/2020) with subsequent pelvic RT (completed 10/15/2020) lost to follow-up found to have recurrent metastatic colon cancer to the liver.     She was previously diagnosed and treated for her breast and colon cancers in [place].      6/23/2020 - PET/CT - hypermetabolic mass in sigmoid colon & hypermet mass in R breast      7/6/2020 - sigmoid colon resection - pathology: Moderately differentiated invasive adenocarcinoma, 5.5 cm in diameter. Invades through muscularis propria focally into subserosal tissue. Marked adhesion of small bowel segments to colon without involvement of malignancy, +0/28 LN, negative margins, small focus of carcinoma in the serosa of margin furthest from this tumor (pT3 N0), at least stage IIA     8/12/2020 - BL mammogram & US: 3.6 cm mass in R breast     8/20/2020 - R breast biopsy - path: Invasive poorly differentiated ductal carcinoma. ER positive and RI positive and HER-2 by IHC is 1+, but by HER-2 AMARIS is positive. On MammaPrint testing malignancy is ER and RI positive and HER-2  positive, high risk, with a predicted 5-year metastasis free survival of 93% with chemotherapy and hormonal therapy versus 71% metastasis free survival with hormonal therapy alone.      10/15/2020 - Completed pelvic radiation      11/5/2020 - R lumpectomy and R SLNBx - path: 2.6 cm invasive ductal carcinoma, with all margins free of malignancy. The malignancy comes to within 1 mm of the closest deep margin. Perineural invasion is present. There is a focus of metastatic carcinoma, measuring 0.5 mm in 1 of 2 sentinel lymph nodes. pT2 pN1mic. Stage IIB     1/8/2021 - CT CAP - RLQ colostomy in tact, tiny nodules LLL nonspecific     5/11/2021 - CT CAP - stable small pulm nodules, worsening ventral hernia, interval takedown of LLQ ostomy, no evidence of met disease      Transitioned her care to her PCP where she was being prescribed letrozole. Has not had repeat mammogram or colonoscopy.     4/10/24 - Re-established care with medical oncology with Dr. Riccardo Blake at [facility]. Had not been seen by medical oncology since August 2021. Letrozole had been refilled by PCP. CT CAP, colonoscopy, CEA, CA 19-9, CA 27.29 were all ordered.      6/18/24 - CT A/P w contrast - large hepatic mass (7.5x8.7cm) involving both anterior segment of R lobe as well as medial segment of L lobe likely related to metastatic disease possibly colon cancer however met breast CA not excluded w hx; enlarged mohit hepatic LN likely metastatic, probably stable sidebranch IPMN and in pancreas   CT chest non con - stable tiny nodule superolateral LLL (3mm)     6/24/24 - Dr. Blake discussed imaging. Biopsy ordered.      7/1/24 - CT guided biopsy of liver lesion, path showed adenocarcinoma, metastatic from patient's known colonic primary, IHC +CK20, CDX2, SATB2 while negative for GATA3 and CK7, pMMR     Current Treatment:    7/24 - supposed to start FOLFOX. Had issues with skin open over port line.    7/31/24 - present: FOLFOX    Interval History:    Ms. Dodd  is here today with Tyler to start treatment. Continuing to have intermittent anxiety. Treatment had to be delayed to this week due to skin open above port line last week. Eating and drinking okay.      ROS:  10-pt ROS reviewed and negative except as mentioned above.     PMHx / FHx / PSHx: reviewed and are accurate    Medications: below was reviewed and is accurate    Current Outpatient Medications:     allopurinol (Zyloprim) 100 mg tablet, Take 3 tablets (300 mg) by mouth 2 times a day., Disp: , Rfl:     amLODIPine (Norvasc) 5 mg tablet, Take 1 tablet (5 mg) by mouth once daily., Disp: , Rfl:     atorvastatin (Lipitor) 20 mg tablet, Take 1 tablet (20 mg) by mouth once daily., Disp: , Rfl:     cholecalciferol (Vitamin D-3) 25 MCG (1000 UT) capsule, Take 1 capsule (25 mcg) by mouth once daily., Disp: , Rfl:     escitalopram (Lexapro) 10 mg tablet, Take 1 tablet (10 mg) by mouth once daily. (Patient not taking: Reported on 7/24/2024), Disp: 30 tablet, Rfl: 2    hydrOXYzine HCL (Atarax) 10 mg tablet, Take 1 tablet (10 mg) by mouth every 6 hours if needed for anxiety., Disp: 30 tablet, Rfl: 0    ibuprofen 800 mg tablet, Take 1 tablet (800 mg) by mouth every 8 hours if needed., Disp: , Rfl:     leflunomide (Arava) 10 mg tablet, Take by mouth once daily., Disp: , Rfl:     letrozole (Femara) 2.5 mg tablet, Take 1 tablet (2.5 mg total) by mouth once daily.  Take with or without food., Disp: , Rfl:     loperamide (Imodium) 2 mg capsule, Take 2 capsules (4 mg) by mouth with the first episode of diarrhea and 1 capsule (2 mg) by mouth with any additional episodes. Maximum 8 capsules (16 mg) per day. (Patient not taking: Reported on 7/24/2024), Disp: 30 capsule, Rfl: 2    OLANZapine (ZyPREXA) 5 mg tablet, Take 1 tablet (5 mg) by mouth once daily at bedtime. 1 tab at bedtime for 3 days, starting the night of chemo, Disp: 30 tablet, Rfl: 3    omeprazole (PriLOSEC) 40 mg DR capsule, Take 1 capsule (40 mg) by mouth. Do not crush or  chew., Disp: , Rfl:     ondansetron (Zofran) 8 mg tablet, Take 1 tablet (8 mg) by mouth every 8 hours if needed for nausea or vomiting. (Patient not taking: Reported on 2024), Disp: 30 tablet, Rfl: 5    oxyCODONE (Roxicodone) 5 mg immediate release tablet, Take 1 tablet (5 mg) by mouth every 6 hours if needed for moderate pain (4 - 6) or severe pain (7 - 10). G89.3, Disp: 30 tablet, Rfl: 0    prochlorperazine (Compazine) 10 mg tablet, Take 1 tablet (10 mg) by mouth every 6 hours if needed for nausea or vomiting. (Patient not taking: Reported on 2024), Disp: 30 tablet, Rfl: 5    Physical exam:  Vitals:    24 0819   BP: 158/88   BP Location: Left arm   Patient Position: Sitting   BP Cuff Size: Adult   Pulse: 90   Resp: 18   Temp: 36.6 °C (97.8 °F)   TempSrc: Temporal   SpO2: 99%   Weight: 69.1 kg (152 lb 5.4 oz)   ECO  GEN: NAD  SKIN: skin closed over port line   LUNGS: normal respiratory effort  EXT: No deformities or edema  NEURO: Grossly intact. No focal deficits.  HEME: No signs of easy bruising or active bleeding.  PSYCH: Appropriate mood and affect    Laboratory review:    Tempus xF pending     24 labs reviewed     ASSESSMENT AND PLAN:     Ms. Elana Dodd is a 70 yo F with PMH of HTN, RA (on leflunomide), HLD, GERD, s/p R nephrectomy (, donated kidney to sister), R breast cancer (20, ER+/DE+/HER2 1+ by IHC/AMARIS+) s/p R lumpectomy and R SLN (2020) followed by carboplatin/Taxotere/Herceptin x4C (stopped due to SE) and R RT (completed 2021) on letrozole (since 2021), and stage IIA colon cancer s/p laparoscopic sigmoid colectomy and colostomy and en bloc resection with appendectomy and L ureterolysis (2020) with subsequent pelvic RT (completed 10/15/2020) lost to follow-up found to have recurrent metastatic colon cancer to the liver here today for follow-up.     Metastatic colon cancer to the liver: Tempus xF pending. Labs with acceptable limits. Proceed with cycle 1  FOLFOX. Starting with FOLFOX. Starting with FOLFOX first few cycles based on patient's comfort level. May add irinotecan and/or panitumumab pending how she does. She will be gone for daughter's wedding in [place] October 8-14.   Anxiety: Atarax 20mg PRN.   Pain (R rib and shoulder): Controlled. Likely referred from liver mass. Continue oxycodone 5mg q6h PRN.   Constipation: Recommended taking senna and Miralax if taking oxycodone daily.   Nausea: Olanzapine first 3 nights of chemo. Zofran and Compazine as needed.     RTC in 2 weeks for C2 FOLFOX       Felipa Hu MD  Staff, Gastrointestinal Medical Oncology  [facility]

## 2024-07-31 NOTE — PROGRESS NOTES
Patient here for follow up with Dr. Hu accompanied by her cousin Tyler. Medications and allergies reviewed.     Patient due for first dose of her chemotherapy.     Will be going to [place] on October 8th for her daughters wedding and return on Oct 13/14.     Follow up in 2 weeks for next cycle.

## 2024-07-31 NOTE — SIGNIFICANT EVENT
No blood return from port, flushed easily without pain, redness, swelling or bleeding at site. Cathflo instilled and dwelled for 1hr, brisk return and easily flushing.

## 2024-08-02 ENCOUNTER — INFUSION (OUTPATIENT)
Dept: HEMATOLOGY/ONCOLOGY | Facility: CLINIC | Age: 69
End: 2024-08-02
Payer: COMMERCIAL

## 2024-08-02 VITALS
SYSTOLIC BLOOD PRESSURE: 140 MMHG | OXYGEN SATURATION: 99 % | BODY MASS INDEX: 25.35 KG/M2 | HEART RATE: 89 BPM | DIASTOLIC BLOOD PRESSURE: 94 MMHG | WEIGHT: 157.52 LBS | RESPIRATION RATE: 18 BRPM | TEMPERATURE: 97 F

## 2024-08-02 DIAGNOSIS — C18.9 METASTATIC COLON CANCER TO LIVER (MULTI): ICD-10-CM

## 2024-08-02 DIAGNOSIS — F41.1 ANXIETY ASSOCIATED WITH CANCER DIAGNOSIS (MULTI): ICD-10-CM

## 2024-08-02 DIAGNOSIS — F41.9 ANXIETY: Primary | ICD-10-CM

## 2024-08-02 DIAGNOSIS — C78.7 METASTATIC COLON CANCER TO LIVER (MULTI): ICD-10-CM

## 2024-08-02 DIAGNOSIS — C80.1 ANXIETY ASSOCIATED WITH CANCER DIAGNOSIS (MULTI): ICD-10-CM

## 2024-08-02 DIAGNOSIS — C18.9 MALIGNANT NEOPLASM OF COLON, UNSPECIFIED PART OF COLON (MULTI): ICD-10-CM

## 2024-08-02 LAB
DPYD GENE MUT ANL BLD/T: NORMAL
ELECTRONICALLY SIGNED BY: NORMAL

## 2024-08-02 PROCEDURE — 2500000004 HC RX 250 GENERAL PHARMACY W/ HCPCS (ALT 636 FOR OP/ED): Performed by: STUDENT IN AN ORGANIZED HEALTH CARE EDUCATION/TRAINING PROGRAM

## 2024-08-02 PROCEDURE — 96416 CHEMO PROLONG INFUSE W/PUMP: CPT

## 2024-08-02 RX ORDER — HEPARIN 100 UNIT/ML
500 SYRINGE INTRAVENOUS AS NEEDED
OUTPATIENT
Start: 2024-08-02

## 2024-08-02 RX ORDER — HEPARIN 100 UNIT/ML
500 SYRINGE INTRAVENOUS AS NEEDED
Status: DISCONTINUED | OUTPATIENT
Start: 2024-08-02 | End: 2024-08-02 | Stop reason: HOSPADM

## 2024-08-02 RX ORDER — LORAZEPAM 0.5 MG/1
0.25 TABLET ORAL EVERY 4 HOURS PRN
Qty: 30 TABLET | Refills: 0 | Status: SHIPPED | OUTPATIENT
Start: 2024-08-02

## 2024-08-02 RX ORDER — HEPARIN SODIUM,PORCINE/PF 10 UNIT/ML
50 SYRINGE (ML) INTRAVENOUS AS NEEDED
Status: DISCONTINUED | OUTPATIENT
Start: 2024-08-02 | End: 2024-08-02 | Stop reason: HOSPADM

## 2024-08-02 RX ORDER — HEPARIN SODIUM,PORCINE/PF 10 UNIT/ML
50 SYRINGE (ML) INTRAVENOUS AS NEEDED
OUTPATIENT
Start: 2024-08-02

## 2024-08-02 ASSESSMENT — PAIN SCALES - GENERAL: PAINLEVEL: 0-NO PAIN

## 2024-08-02 NOTE — PROGRESS NOTES
Pt arrived ambulatory for pump disconnect. Pt states that she is constipated and has worsening anxiety. Dr. Hu messaged via secure chat regarding these issues. She ordered for patient to take over the counter senna x1 daily up to 2x daily and take miralax x1 daily as needed. She also will be sending in a script for ativan. Pt verbalized understanding via teachback. Port site clean/dry/intact. Brisk blood return present. Port flushed per order, deaccessed and band aid applied. Pt given AVS with written instructions regarding bowel medication regimen. Discussed upcoming appts. Pt verbalized understanding. Pt left infusion ambulatory.

## 2024-08-05 ENCOUNTER — APPOINTMENT (OUTPATIENT)
Dept: HEMATOLOGY/ONCOLOGY | Facility: HOSPITAL | Age: 69
End: 2024-08-05
Payer: COMMERCIAL

## 2024-08-07 ENCOUNTER — APPOINTMENT (OUTPATIENT)
Dept: HEMATOLOGY/ONCOLOGY | Facility: CLINIC | Age: 69
End: 2024-08-07
Payer: COMMERCIAL

## 2024-08-09 ENCOUNTER — APPOINTMENT (OUTPATIENT)
Dept: HEMATOLOGY/ONCOLOGY | Facility: CLINIC | Age: 69
End: 2024-08-09
Payer: COMMERCIAL

## 2024-08-13 ENCOUNTER — INFUSION (OUTPATIENT)
Dept: HEMATOLOGY/ONCOLOGY | Facility: CLINIC | Age: 69
End: 2024-08-13
Payer: COMMERCIAL

## 2024-08-13 DIAGNOSIS — C18.9 MALIGNANT NEOPLASM OF COLON, UNSPECIFIED PART OF COLON (MULTI): ICD-10-CM

## 2024-08-13 DIAGNOSIS — C78.7 METASTATIC COLON CANCER TO LIVER (MULTI): ICD-10-CM

## 2024-08-13 DIAGNOSIS — C18.9 METASTATIC COLON CANCER TO LIVER (MULTI): ICD-10-CM

## 2024-08-13 LAB
ALBUMIN SERPL BCP-MCNC: 4 G/DL (ref 3.4–5)
ALP SERPL-CCNC: 251 U/L (ref 33–136)
ALT SERPL W P-5'-P-CCNC: 18 U/L (ref 7–45)
ANION GAP SERPL CALC-SCNC: 14 MMOL/L (ref 10–20)
AST SERPL W P-5'-P-CCNC: 67 U/L (ref 9–39)
BASOPHILS # BLD AUTO: 0.02 X10*3/UL (ref 0–0.1)
BASOPHILS NFR BLD AUTO: 0.2 %
BILIRUB SERPL-MCNC: 0.3 MG/DL (ref 0–1.2)
BUN SERPL-MCNC: 12 MG/DL (ref 6–23)
CALCIUM SERPL-MCNC: 9.3 MG/DL (ref 8.6–10.3)
CHLORIDE SERPL-SCNC: 101 MMOL/L (ref 98–107)
CO2 SERPL-SCNC: 24 MMOL/L (ref 21–32)
CREAT SERPL-MCNC: 0.79 MG/DL (ref 0.5–1.05)
EGFRCR SERPLBLD CKD-EPI 2021: 82 ML/MIN/1.73M*2
EOSINOPHIL # BLD AUTO: 0.14 X10*3/UL (ref 0–0.7)
EOSINOPHIL NFR BLD AUTO: 1.7 %
ERYTHROCYTE [DISTWIDTH] IN BLOOD BY AUTOMATED COUNT: 15.2 % (ref 11.5–14.5)
GLUCOSE SERPL-MCNC: 129 MG/DL (ref 74–99)
HCT VFR BLD AUTO: 31.3 % (ref 36–46)
HGB BLD-MCNC: 9.7 G/DL (ref 12–16)
IMM GRANULOCYTES # BLD AUTO: 0.02 X10*3/UL (ref 0–0.7)
IMM GRANULOCYTES NFR BLD AUTO: 0.2 % (ref 0–0.9)
LYMPHOCYTES # BLD AUTO: 1.16 X10*3/UL (ref 1.2–4.8)
LYMPHOCYTES NFR BLD AUTO: 14.1 %
MCH RBC QN AUTO: 28 PG (ref 26–34)
MCHC RBC AUTO-ENTMCNC: 31 G/DL (ref 32–36)
MCV RBC AUTO: 91 FL (ref 80–100)
MONOCYTES # BLD AUTO: 0.69 X10*3/UL (ref 0.1–1)
MONOCYTES NFR BLD AUTO: 8.4 %
NEUTROPHILS # BLD AUTO: 6.2 X10*3/UL (ref 1.2–7.7)
NEUTROPHILS NFR BLD AUTO: 75.4 %
NRBC BLD-RTO: 0 /100 WBCS (ref 0–0)
PLATELET # BLD AUTO: 291 X10*3/UL (ref 150–450)
POTASSIUM SERPL-SCNC: 3.3 MMOL/L (ref 3.5–5.3)
PROT SERPL-MCNC: 7.4 G/DL (ref 6.4–8.2)
RBC # BLD AUTO: 3.46 X10*6/UL (ref 4–5.2)
SODIUM SERPL-SCNC: 136 MMOL/L (ref 136–145)
WBC # BLD AUTO: 8.2 X10*3/UL (ref 4.4–11.3)

## 2024-08-13 PROCEDURE — 84075 ASSAY ALKALINE PHOSPHATASE: CPT

## 2024-08-13 PROCEDURE — 2500000004 HC RX 250 GENERAL PHARMACY W/ HCPCS (ALT 636 FOR OP/ED): Performed by: STUDENT IN AN ORGANIZED HEALTH CARE EDUCATION/TRAINING PROGRAM

## 2024-08-13 PROCEDURE — 85025 COMPLETE CBC W/AUTO DIFF WBC: CPT

## 2024-08-13 PROCEDURE — 36591 DRAW BLOOD OFF VENOUS DEVICE: CPT

## 2024-08-13 RX ORDER — HEPARIN 100 UNIT/ML
500 SYRINGE INTRAVENOUS AS NEEDED
Status: DISCONTINUED | OUTPATIENT
Start: 2024-08-13 | End: 2024-08-13 | Stop reason: HOSPADM

## 2024-08-13 RX ORDER — HEPARIN 100 UNIT/ML
500 SYRINGE INTRAVENOUS AS NEEDED
Status: CANCELLED | OUTPATIENT
Start: 2024-08-13

## 2024-08-13 RX ORDER — HEPARIN SODIUM,PORCINE/PF 10 UNIT/ML
50 SYRINGE (ML) INTRAVENOUS AS NEEDED
Status: CANCELLED | OUTPATIENT
Start: 2024-08-13

## 2024-08-14 ENCOUNTER — OFFICE VISIT (OUTPATIENT)
Dept: HEMATOLOGY/ONCOLOGY | Facility: CLINIC | Age: 69
End: 2024-08-14
Payer: COMMERCIAL

## 2024-08-14 ENCOUNTER — INFUSION (OUTPATIENT)
Dept: HEMATOLOGY/ONCOLOGY | Facility: CLINIC | Age: 69
End: 2024-08-14
Payer: COMMERCIAL

## 2024-08-14 VITALS
WEIGHT: 159.72 LBS | OXYGEN SATURATION: 100 % | TEMPERATURE: 98.9 F | HEART RATE: 92 BPM | SYSTOLIC BLOOD PRESSURE: 162 MMHG | DIASTOLIC BLOOD PRESSURE: 87 MMHG | BODY MASS INDEX: 25.7 KG/M2 | RESPIRATION RATE: 18 BRPM

## 2024-08-14 DIAGNOSIS — C18.9 MALIGNANT NEOPLASM OF COLON, UNSPECIFIED PART OF COLON (MULTI): ICD-10-CM

## 2024-08-14 DIAGNOSIS — C18.9 METASTATIC COLON CANCER TO LIVER (MULTI): ICD-10-CM

## 2024-08-14 DIAGNOSIS — C78.7 METASTATIC COLON CANCER TO LIVER (MULTI): ICD-10-CM

## 2024-08-14 DIAGNOSIS — C18.9 MALIGNANT NEOPLASM OF COLON, UNSPECIFIED PART OF COLON (MULTI): Primary | ICD-10-CM

## 2024-08-14 PROCEDURE — 99215 OFFICE O/P EST HI 40 MIN: CPT | Mod: 25 | Performed by: STUDENT IN AN ORGANIZED HEALTH CARE EDUCATION/TRAINING PROGRAM

## 2024-08-14 PROCEDURE — 2500000004 HC RX 250 GENERAL PHARMACY W/ HCPCS (ALT 636 FOR OP/ED): Performed by: STUDENT IN AN ORGANIZED HEALTH CARE EDUCATION/TRAINING PROGRAM

## 2024-08-14 PROCEDURE — 96411 CHEMO IV PUSH ADDL DRUG: CPT

## 2024-08-14 PROCEDURE — 96413 CHEMO IV INFUSION 1 HR: CPT

## 2024-08-14 PROCEDURE — 96415 CHEMO IV INFUSION ADDL HR: CPT

## 2024-08-14 PROCEDURE — 96416 CHEMO PROLONG INFUSE W/PUMP: CPT

## 2024-08-14 PROCEDURE — 1126F AMNT PAIN NOTED NONE PRSNT: CPT | Performed by: STUDENT IN AN ORGANIZED HEALTH CARE EDUCATION/TRAINING PROGRAM

## 2024-08-14 PROCEDURE — 96375 TX/PRO/DX INJ NEW DRUG ADDON: CPT | Mod: INF

## 2024-08-14 PROCEDURE — 99215 OFFICE O/P EST HI 40 MIN: CPT | Performed by: STUDENT IN AN ORGANIZED HEALTH CARE EDUCATION/TRAINING PROGRAM

## 2024-08-14 PROCEDURE — 2500000002 HC RX 250 W HCPCS SELF ADMINISTERED DRUGS (ALT 637 FOR MEDICARE OP, ALT 636 FOR OP/ED): Performed by: STUDENT IN AN ORGANIZED HEALTH CARE EDUCATION/TRAINING PROGRAM

## 2024-08-14 RX ORDER — ALBUTEROL SULFATE 0.83 MG/ML
3 SOLUTION RESPIRATORY (INHALATION) AS NEEDED
OUTPATIENT
Start: 2024-08-16

## 2024-08-14 RX ORDER — PROCHLORPERAZINE EDISYLATE 5 MG/ML
10 INJECTION INTRAMUSCULAR; INTRAVENOUS EVERY 6 HOURS PRN
Status: DISCONTINUED | OUTPATIENT
Start: 2024-08-14 | End: 2024-08-14 | Stop reason: HOSPADM

## 2024-08-14 RX ORDER — POTASSIUM CHLORIDE 20 MEQ/1
20 TABLET, EXTENDED RELEASE ORAL ONCE
Status: CANCELLED | OUTPATIENT
Start: 2024-08-14

## 2024-08-14 RX ORDER — PROCHLORPERAZINE MALEATE 10 MG
10 TABLET ORAL EVERY 6 HOURS PRN
Status: DISCONTINUED | OUTPATIENT
Start: 2024-08-14 | End: 2024-08-14 | Stop reason: HOSPADM

## 2024-08-14 RX ORDER — PALONOSETRON 0.05 MG/ML
0.25 INJECTION, SOLUTION INTRAVENOUS ONCE
Status: CANCELLED | OUTPATIENT
Start: 2024-08-14

## 2024-08-14 RX ORDER — LORAZEPAM 2 MG/ML
1 INJECTION INTRAMUSCULAR AS NEEDED
Status: CANCELLED | OUTPATIENT
Start: 2024-08-14

## 2024-08-14 RX ORDER — DIPHENHYDRAMINE HYDROCHLORIDE 50 MG/ML
50 INJECTION INTRAMUSCULAR; INTRAVENOUS AS NEEDED
Status: CANCELLED | OUTPATIENT
Start: 2024-08-14

## 2024-08-14 RX ORDER — POTASSIUM CHLORIDE 20 MEQ/1
20 TABLET, EXTENDED RELEASE ORAL ONCE
Status: COMPLETED | OUTPATIENT
Start: 2024-08-14 | End: 2024-08-14

## 2024-08-14 RX ORDER — EPINEPHRINE 0.3 MG/.3ML
0.3 INJECTION SUBCUTANEOUS EVERY 5 MIN PRN
Status: DISCONTINUED | OUTPATIENT
Start: 2024-08-14 | End: 2024-08-14 | Stop reason: HOSPADM

## 2024-08-14 RX ORDER — ALBUTEROL SULFATE 0.83 MG/ML
3 SOLUTION RESPIRATORY (INHALATION) AS NEEDED
Status: CANCELLED | OUTPATIENT
Start: 2024-08-14

## 2024-08-14 RX ORDER — PALONOSETRON 0.05 MG/ML
0.25 INJECTION, SOLUTION INTRAVENOUS ONCE
Status: COMPLETED | OUTPATIENT
Start: 2024-08-14 | End: 2024-08-14

## 2024-08-14 RX ORDER — LORAZEPAM 2 MG/ML
1 INJECTION INTRAMUSCULAR AS NEEDED
Status: DISCONTINUED | OUTPATIENT
Start: 2024-08-14 | End: 2024-08-14 | Stop reason: HOSPADM

## 2024-08-14 RX ORDER — EPINEPHRINE 0.3 MG/.3ML
0.3 INJECTION SUBCUTANEOUS EVERY 5 MIN PRN
Status: CANCELLED | OUTPATIENT
Start: 2024-08-14

## 2024-08-14 RX ORDER — OXYCODONE HYDROCHLORIDE 15 MG/1
15 TABLET ORAL EVERY 6 HOURS PRN
Qty: 10 TABLET | Refills: 0 | Status: SHIPPED | OUTPATIENT
Start: 2024-08-14 | End: 2024-10-13

## 2024-08-14 RX ORDER — DIPHENHYDRAMINE HYDROCHLORIDE 50 MG/ML
50 INJECTION INTRAMUSCULAR; INTRAVENOUS AS NEEDED
OUTPATIENT
Start: 2024-08-16

## 2024-08-14 RX ORDER — EPINEPHRINE 0.3 MG/.3ML
0.3 INJECTION SUBCUTANEOUS EVERY 5 MIN PRN
OUTPATIENT
Start: 2024-08-16

## 2024-08-14 RX ORDER — FAMOTIDINE 10 MG/ML
20 INJECTION INTRAVENOUS ONCE AS NEEDED
Status: DISCONTINUED | OUTPATIENT
Start: 2024-08-14 | End: 2024-08-14 | Stop reason: HOSPADM

## 2024-08-14 RX ORDER — FAMOTIDINE 10 MG/ML
20 INJECTION INTRAVENOUS ONCE AS NEEDED
OUTPATIENT
Start: 2024-08-16

## 2024-08-14 RX ORDER — ALBUTEROL SULFATE 0.83 MG/ML
3 SOLUTION RESPIRATORY (INHALATION) AS NEEDED
Status: DISCONTINUED | OUTPATIENT
Start: 2024-08-14 | End: 2024-08-14 | Stop reason: HOSPADM

## 2024-08-14 RX ORDER — PROCHLORPERAZINE EDISYLATE 5 MG/ML
10 INJECTION INTRAMUSCULAR; INTRAVENOUS EVERY 6 HOURS PRN
Status: CANCELLED | OUTPATIENT
Start: 2024-08-14

## 2024-08-14 RX ORDER — FAMOTIDINE 10 MG/ML
20 INJECTION INTRAVENOUS ONCE AS NEEDED
Status: CANCELLED | OUTPATIENT
Start: 2024-08-14

## 2024-08-14 RX ORDER — PROCHLORPERAZINE MALEATE 10 MG
10 TABLET ORAL EVERY 6 HOURS PRN
Status: CANCELLED | OUTPATIENT
Start: 2024-08-14

## 2024-08-14 RX ORDER — DIPHENHYDRAMINE HYDROCHLORIDE 50 MG/ML
50 INJECTION INTRAMUSCULAR; INTRAVENOUS AS NEEDED
Status: DISCONTINUED | OUTPATIENT
Start: 2024-08-14 | End: 2024-08-14 | Stop reason: HOSPADM

## 2024-08-14 ASSESSMENT — PAIN SCALES - GENERAL: PAINLEVEL: 0-NO PAIN

## 2024-08-14 NOTE — PROGRESS NOTES
RN reviewed oxaliplatin cold sensitivity and CADD pump instructions. RN reviewed signs and symptoms to monitor while at home; infection control, bleeding precautions, diet/nutrition, supportive medicines, and chemo spill kit. AVS printed with pump disconnect time written. Patient verbalized understanding with teach back.  Patient has no further questions at this time.

## 2024-08-14 NOTE — PROGRESS NOTES
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 Elana Dodd is a 70 yo F with PMH of HTN, RA (on leflunomide), HLD, GERD, s/p R nephrectomy (1998, donated kidney to sister), R breast cancer (8/20/20, ER+/SD+/HER2 1+ by IHC/AMARIS+) s/p R lumpectomy and R SLN (11/5/2020) followed by carboplatin/Taxotere/Herceptin x4C (stopped due to SE) and R RT (completed 7/2021) on letrozole (since 7/2021), and stage IIA colon cancer s/p laparoscopic sigmoid colectomy and colostomy and en bloc resection with appendectomy and L ureterolysis (7/6/2020) with subsequent pelvic RT (completed 10/15/2020) lost to follow-up found to have recurrent metastatic colon cancer to the liver.     She was previously diagnosed and treated for her breast and colon cancers in [place].      6/23/2020 - PET/CT - hypermetabolic mass in sigmoid colon & hypermet mass in R breast      7/6/2020 - sigmoid colon resection - pathology: Moderately differentiated invasive adenocarcinoma, 5.5 cm in diameter. Invades through muscularis propria focally into subserosal tissue. Marked adhesion of small bowel segments to colon without involvement of malignancy, +0/28 LN, negative margins, small focus of carcinoma in the serosa of margin furthest from this tumor (pT3 N0), at least stage IIA     8/12/2020 - BL mammogram & US: 3.6 cm mass in R breast     8/20/2020 - R breast biopsy - path: Invasive poorly differentiated ductal carcinoma. ER positive and SD positive and HER-2 by IHC is 1+, but by HER-2 AMARIS is positive. On MammaPrint testing malignancy is ER and SD positive and HER-2  positive, high risk, with a predicted 5-year metastasis free survival of 93% with chemotherapy and hormonal therapy versus 71% metastasis free survival with hormonal therapy alone.      10/15/2020 - Completed pelvic radiation      11/5/2020 - R lumpectomy and R SLNBx - path: 2.6 cm invasive ductal carcinoma, with all margins free of malignancy. The malignancy comes to within 1 mm of the closest deep margin. Perineural invasion is present. There is a focus of metastatic carcinoma, measuring 0.5 mm in 1 of 2 sentinel lymph nodes. pT2 pN1mic. Stage IIB     1/8/2021 - CT CAP - RLQ colostomy in tact, tiny nodules LLL nonspecific     5/11/2021 - CT CAP - stable small pulm nodules, worsening ventral hernia, interval takedown of LLQ ostomy, no evidence of met disease      Transitioned her care to her PCP where she was being prescribed letrozole. Has not had repeat mammogram or colonoscopy.     4/10/24 - Re-established care with medical oncology with Dr. Riccardo Blake at [facility]. Had not been seen by medical oncology since August 2021. Letrozole had been refilled by PCP. CT CAP, colonoscopy, CEA, CA 19-9, CA 27.29 were all ordered.      6/18/24 - CT A/P w contrast - large hepatic mass (7.5x8.7cm) involving both anterior segment of R lobe as well as medial segment of L lobe likely related to metastatic disease possibly colon cancer however met breast CA not excluded w hx; enlarged mohit hepatic LN likely metastatic, probably stable sidebranch IPMN and in pancreas   CT chest non con - stable tiny nodule superolateral LLL (3mm)     6/24/24 - Dr. Blake discussed imaging. Biopsy ordered.      7/1/24 - CT guided biopsy of liver lesion, path showed adenocarcinoma, metastatic from patient's known colonic primary, IHC +CK20, CDX2, SATB2 while negative for GATA3 and CK7, pMMR     Current Treatment:    7/24 - supposed to start FOLFOX. Had issues with skin open over port line.    7/31/24 - present: FOLFOX    Interval History:    Ms. Dodd  is here today by herself for C2. Cold sensitivity on/off. No N/V. No diarrhea. Her anxiety has been better but she's not sleeping well. Denies having racing thoughts or being anxious. Falls asleep okay but can't stay asleep. Not taking naps during the day. Only caffeine is in AM. Watches movie before bed which isn't new. Never had sleep issues before. Has barely slept since before last cycle. Confused on what medications she should be taking. Unclear if she's been taking olanzapine or not. Taking 2 oxycodone pills at night for sleep. Oxycodone isn't helping her pain. She tried 1/2 a 50mg trazodone pill from her cousin (Tyler's brother) which didn't help. Her 3 yo granddaughter was with her last week. She's hoping to sleep today in infusion.     ROS:  10-pt ROS reviewed and negative except as mentioned above.     PMHx / FHx / PSHx: reviewed and are accurate    Medications: below was reviewed and is accurate    Current Outpatient Medications:     allopurinol (Zyloprim) 100 mg tablet, Take 3 tablets (300 mg) by mouth 2 times a day., Disp: , Rfl:     amLODIPine (Norvasc) 5 mg tablet, Take 1 tablet (5 mg) by mouth once daily., Disp: , Rfl:     atorvastatin (Lipitor) 20 mg tablet, Take 1 tablet (20 mg) by mouth once daily., Disp: , Rfl:     cholecalciferol (Vitamin D-3) 25 MCG (1000 UT) capsule, Take 1 capsule (25 mcg) by mouth once daily., Disp: , Rfl:     escitalopram (Lexapro) 10 mg tablet, Take 1 tablet (10 mg) by mouth once daily., Disp: 30 tablet, Rfl: 2    hydrOXYzine HCL (Atarax) 10 mg tablet, Take 1 tablet (10 mg) by mouth every 6 hours if needed for anxiety., Disp: 30 tablet, Rfl: 0    ibuprofen 800 mg tablet, Take 1 tablet (800 mg) by mouth every 8 hours if needed., Disp: , Rfl:     leflunomide (Arava) 10 mg tablet, Take by mouth once daily., Disp: , Rfl:     letrozole (Femara) 2.5 mg tablet, Take 1 tablet (2.5 mg total) by mouth once daily.  Take with or without food., Disp: , Rfl:     loperamide (Imodium) 2  mg capsule, Take 2 capsules (4 mg) by mouth with the first episode of diarrhea and 1 capsule (2 mg) by mouth with any additional episodes. Maximum 8 capsules (16 mg) per day., Disp: 30 capsule, Rfl: 2    LORazepam (Ativan) 0.5 mg tablet, Take 0.5 tablets (0.25 mg) by mouth every 4 hours if needed for anxiety., Disp: 30 tablet, Rfl: 0    OLANZapine (ZyPREXA) 5 mg tablet, Take 1 tablet (5 mg) by mouth once daily at bedtime. 1 tab at bedtime for 3 days, starting the night of chemo, Disp: 30 tablet, Rfl: 3    omeprazole (PriLOSEC) 40 mg DR capsule, Take 1 capsule (40 mg) by mouth. Do not crush or chew., Disp: , Rfl:     ondansetron (Zofran) 8 mg tablet, Take 1 tablet (8 mg) by mouth every 8 hours if needed for nausea or vomiting., Disp: 30 tablet, Rfl: 5    oxyCODONE (Roxicodone) 5 mg immediate release tablet, Take 1 tablet (5 mg) by mouth every 6 hours if needed for moderate pain (4 - 6) or severe pain (7 - 10). G89.3, Disp: 30 tablet, Rfl: 0    prochlorperazine (Compazine) 10 mg tablet, Take 1 tablet (10 mg) by mouth every 6 hours if needed for nausea or vomiting., Disp: 30 tablet, Rfl: 5  No current facility-administered medications for this visit.    Physical exam:  Vitals:    24 0842   BP: 162/87   BP Location: Left arm   Patient Position: Sitting   BP Cuff Size: Adult   Pulse: 92   Resp: 18   Temp: 37.2 °C (98.9 °F)   TempSrc: Temporal   SpO2: 100%   Weight: 72.4 kg (159 lb 11.6 oz)   ECO  GEN: NAD, appears tired   LUNGS: normal respiratory effort  EXT: No deformities or edema  NEURO: Grossly intact. No focal deficits.  HEME: No signs of easy bruising or active bleeding.  PSYCH: Appropriate mood and affect    Laboratory review:    Tempus xF pending     24 labs reviewed   K 3.3     ASSESSMENT AND PLAN:     Ms. Elana Dodd is a 68 yo F with PMH of HTN, RA (on leflunomide), HLD, GERD, s/p R nephrectomy (, donated kidney to sister), R breast cancer (20, ER+/MI+/HER2 1+ by IHC/AMARIS+) s/p R  lumpectomy and R SLN (11/5/2020) followed by carboplatin/Taxotere/Herceptin x4C (stopped due to SE) and R RT (completed 7/2021) on letrozole (since 7/2021), and stage IIA colon cancer s/p laparoscopic sigmoid colectomy and colostomy and en bloc resection with appendectomy and L ureterolysis (7/6/2020) with subsequent pelvic RT (completed 10/15/2020) lost to follow-up found to have recurrent metastatic colon cancer to the liver here today for follow-up.     Nursing printed calendar and list of medications to help assist her in how and when to use them as she was getting confused. This calendar/list was provided to her today at this appointment.    Metastatic colon cancer to the liver: Tempus xF pending. Teresa NGS testing done at [facility] shows KRAS WT. Labs and toxicities with acceptable limits. Proceed with cycle 2 FOLFOX. If C2 goes well, will add on panitumumab next cycle. Reviewed potential side effects including rash today. Obtain consent next visit. She will be gone for daughter's wedding in [place] October 8-14.   Anxiety: Hydroxyzine 20mg PRN and lorazepam 0.5mg PRN. Declined escitalopram.  Hypokalemia: 20 mEq K PO in infusion   Pain (R rib and shoulder): Not controlled over past 2 weeks. Increase oxycodone to 15mg every 6 hours as needed. Discussed need for bowel regimen - senna, Miralax. She is to call if pain is still not controlled.   Constipation: Recommended taking senna and Miralax if taking oxycodone daily.   Nausea: Olanzapine nightly. Zofran and Compazine as needed.   Insomnia: Reiterated sleep hygiene - no screen time before bed. Olanzapine nightly.    RTC in 2 weeks for C3 FOLFOX (may add on panitumumab)      Felipa Hu MD  Staff, Gastrointestinal Medical Oncology  [facility]

## 2024-08-14 NOTE — PROGRESS NOTES
"Patient ambulated into clinic for follow up with Dr. Hu alone. Medications and allergies reviewed.     Patient reports that she has not slept at night since her last treatment. Is not taking the lexapro, tried her hydroxyzine but stated it did not help. Is taking her prescribed dose of Ativan for her anxiety which is not helping. Took a half a trazadone from her cousin and only slept for an hour.     \"I am taking my drug store PM's now but those are not helping me either\".     Oxycodone did not help her pain, even with 2 at a time.     Is not sure if she is taking her olanzapine. Re-educated patient on how to make.   Chart of meds- scheduled and PRNs made for patient.     Denies nausea, vomiting and diarrhea.   Complaints of constipation- taking senna nightly   Extremely fatigued.     Follow up in 2 weeks before next cycle.             "

## 2024-08-15 ENCOUNTER — INFUSION (OUTPATIENT)
Dept: HEMATOLOGY/ONCOLOGY | Facility: CLINIC | Age: 69
End: 2024-08-15
Payer: COMMERCIAL

## 2024-08-15 ENCOUNTER — TELEPHONE (OUTPATIENT)
Dept: HEMATOLOGY/ONCOLOGY | Facility: CLINIC | Age: 69
End: 2024-08-15
Payer: COMMERCIAL

## 2024-08-15 VITALS
RESPIRATION RATE: 18 BRPM | HEART RATE: 94 BPM | OXYGEN SATURATION: 98 % | DIASTOLIC BLOOD PRESSURE: 88 MMHG | WEIGHT: 157.85 LBS | TEMPERATURE: 97 F | SYSTOLIC BLOOD PRESSURE: 144 MMHG | BODY MASS INDEX: 25.4 KG/M2

## 2024-08-15 DIAGNOSIS — C18.2 MALIGNANT NEOPLASM OF ASCENDING COLON (MULTI): ICD-10-CM

## 2024-08-15 PROCEDURE — 96523 IRRIG DRUG DELIVERY DEVICE: CPT

## 2024-08-15 ASSESSMENT — PAIN SCALES - GENERAL: PAINLEVEL: 0-NO PAIN

## 2024-08-15 NOTE — TELEPHONE ENCOUNTER
Elana stated her infusion pump is leaking chemo therapy all inside her bandaid and her reserve volume is empty.  Bandaid is intact and not leaking.   I instructed her shut off her pump which she did remove  any wet or stained clothes and bag them until she can get home and wash in hot soapy water and to wash her hand and arms with warm soapy water then head in to [facility].  [facility] RN Alanna notified.  Elana did a teachback.    Dr. Hu and Silvia notified.

## 2024-08-15 NOTE — PROGRESS NOTES
Pt presented to infusion center with her home 5FU pump paused and clamped after speaking with phone RN. Port dressing was damp. Port needle still in place and pt denies it being pulled or dislodged. I disconnected pump and checked for blood return. Brisk blood return and port flushed freely. Contacted Dr. Hu and provided information on what happened. - she agreed with assessment and restarting pump. Changed cap on port tubing, applied new port dressing reattached pump and  restarted pump. Advised patient her pump will be completed around 11:15am Friday 8/16. Provided education to patient on chemo safety and infection prevention with port access. Pt verbalized understanding and was discharged home.    Mohini Traore RN

## 2024-08-16 ENCOUNTER — TELEPHONE (OUTPATIENT)
Dept: PALLIATIVE MEDICINE | Facility: CLINIC | Age: 69
End: 2024-08-16

## 2024-08-16 ENCOUNTER — INFUSION (OUTPATIENT)
Dept: HEMATOLOGY/ONCOLOGY | Facility: CLINIC | Age: 69
End: 2024-08-16
Payer: COMMERCIAL

## 2024-08-16 VITALS
DIASTOLIC BLOOD PRESSURE: 90 MMHG | OXYGEN SATURATION: 98 % | WEIGHT: 155.31 LBS | TEMPERATURE: 97.5 F | HEART RATE: 88 BPM | SYSTOLIC BLOOD PRESSURE: 157 MMHG | BODY MASS INDEX: 24.99 KG/M2 | RESPIRATION RATE: 16 BRPM

## 2024-08-16 DIAGNOSIS — C78.7 METASTATIC COLON CANCER TO LIVER (MULTI): ICD-10-CM

## 2024-08-16 DIAGNOSIS — C18.9 MALIGNANT NEOPLASM OF COLON, UNSPECIFIED PART OF COLON (MULTI): ICD-10-CM

## 2024-08-16 DIAGNOSIS — G47.00 INSOMNIA, UNSPECIFIED TYPE: Primary | ICD-10-CM

## 2024-08-16 DIAGNOSIS — G89.3 CANCER RELATED PAIN: ICD-10-CM

## 2024-08-16 DIAGNOSIS — C18.9 METASTATIC COLON CANCER TO LIVER (MULTI): ICD-10-CM

## 2024-08-16 PROCEDURE — 2500000004 HC RX 250 GENERAL PHARMACY W/ HCPCS (ALT 636 FOR OP/ED): Performed by: STUDENT IN AN ORGANIZED HEALTH CARE EDUCATION/TRAINING PROGRAM

## 2024-08-16 RX ORDER — HEPARIN SODIUM,PORCINE/PF 10 UNIT/ML
50 SYRINGE (ML) INTRAVENOUS AS NEEDED
OUTPATIENT
Start: 2024-08-16

## 2024-08-16 RX ORDER — TRAZODONE HYDROCHLORIDE 50 MG/1
50 TABLET ORAL NIGHTLY
Qty: 30 TABLET | Refills: 0 | Status: SHIPPED | OUTPATIENT
Start: 2024-08-16 | End: 2024-09-15

## 2024-08-16 RX ORDER — HEPARIN SODIUM,PORCINE/PF 10 UNIT/ML
50 SYRINGE (ML) INTRAVENOUS AS NEEDED
Status: DISCONTINUED | OUTPATIENT
Start: 2024-08-16 | End: 2024-08-16 | Stop reason: HOSPADM

## 2024-08-16 RX ORDER — HEPARIN 100 UNIT/ML
500 SYRINGE INTRAVENOUS AS NEEDED
OUTPATIENT
Start: 2024-08-16

## 2024-08-16 RX ORDER — HEPARIN 100 UNIT/ML
500 SYRINGE INTRAVENOUS AS NEEDED
Status: DISCONTINUED | OUTPATIENT
Start: 2024-08-16 | End: 2024-08-16 | Stop reason: HOSPADM

## 2024-08-16 ASSESSMENT — PAIN SCALES - GENERAL: PAINLEVEL: 0-NO PAIN

## 2024-08-21 ENCOUNTER — TELEPHONE (OUTPATIENT)
Dept: HEMATOLOGY/ONCOLOGY | Facility: CLINIC | Age: 69
End: 2024-08-21
Payer: COMMERCIAL

## 2024-08-21 ENCOUNTER — APPOINTMENT (OUTPATIENT)
Dept: HEMATOLOGY/ONCOLOGY | Facility: CLINIC | Age: 69
End: 2024-08-21
Payer: COMMERCIAL

## 2024-08-21 DIAGNOSIS — C78.7 METASTATIC COLON CANCER TO LIVER (MULTI): ICD-10-CM

## 2024-08-21 DIAGNOSIS — C18.9 MALIGNANT NEOPLASM OF COLON, UNSPECIFIED PART OF COLON (MULTI): ICD-10-CM

## 2024-08-21 DIAGNOSIS — C18.9 METASTATIC COLON CANCER TO LIVER (MULTI): ICD-10-CM

## 2024-08-21 RX ORDER — OXYCODONE HYDROCHLORIDE 15 MG/1
15 TABLET ORAL EVERY 6 HOURS PRN
Qty: 20 TABLET | Refills: 0 | Status: SHIPPED | OUTPATIENT
Start: 2024-08-21

## 2024-08-23 ENCOUNTER — APPOINTMENT (OUTPATIENT)
Dept: HEMATOLOGY/ONCOLOGY | Facility: CLINIC | Age: 69
End: 2024-08-23
Payer: COMMERCIAL

## 2024-08-23 ENCOUNTER — APPOINTMENT (OUTPATIENT)
Dept: PALLIATIVE MEDICINE | Facility: HOSPITAL | Age: 69
End: 2024-08-23
Payer: COMMERCIAL

## 2024-08-27 ENCOUNTER — INFUSION (OUTPATIENT)
Dept: HEMATOLOGY/ONCOLOGY | Facility: CLINIC | Age: 69
End: 2024-08-27
Payer: COMMERCIAL

## 2024-08-27 DIAGNOSIS — C18.9 METASTATIC COLON CANCER TO LIVER (MULTI): ICD-10-CM

## 2024-08-27 DIAGNOSIS — C78.7 METASTATIC COLON CANCER TO LIVER (MULTI): ICD-10-CM

## 2024-08-27 DIAGNOSIS — C18.9 MALIGNANT NEOPLASM OF COLON, UNSPECIFIED PART OF COLON (MULTI): ICD-10-CM

## 2024-08-27 LAB
ALBUMIN SERPL BCP-MCNC: 3.8 G/DL (ref 3.4–5)
ALP SERPL-CCNC: 269 U/L (ref 33–136)
ALT SERPL W P-5'-P-CCNC: 24 U/L (ref 7–45)
ANION GAP SERPL CALC-SCNC: 15 MMOL/L (ref 10–20)
AST SERPL W P-5'-P-CCNC: 86 U/L (ref 9–39)
BASOPHILS # BLD AUTO: 0.03 X10*3/UL (ref 0–0.1)
BASOPHILS NFR BLD AUTO: 0.3 %
BILIRUB SERPL-MCNC: 0.4 MG/DL (ref 0–1.2)
BUN SERPL-MCNC: 9 MG/DL (ref 6–23)
CALCIUM SERPL-MCNC: 8.8 MG/DL (ref 8.6–10.3)
CHLORIDE SERPL-SCNC: 100 MMOL/L (ref 98–107)
CO2 SERPL-SCNC: 23 MMOL/L (ref 21–32)
CREAT SERPL-MCNC: 0.66 MG/DL (ref 0.5–1.05)
EGFRCR SERPLBLD CKD-EPI 2021: >90 ML/MIN/1.73M*2
EOSINOPHIL # BLD AUTO: 0.07 X10*3/UL (ref 0–0.7)
EOSINOPHIL NFR BLD AUTO: 0.8 %
ERYTHROCYTE [DISTWIDTH] IN BLOOD BY AUTOMATED COUNT: 15.7 % (ref 11.5–14.5)
GLUCOSE SERPL-MCNC: 96 MG/DL (ref 74–99)
HCT VFR BLD AUTO: 31 % (ref 36–46)
HGB BLD-MCNC: 9.9 G/DL (ref 12–16)
IMM GRANULOCYTES # BLD AUTO: 0.05 X10*3/UL (ref 0–0.7)
IMM GRANULOCYTES NFR BLD AUTO: 0.6 % (ref 0–0.9)
LYMPHOCYTES # BLD AUTO: 1.15 X10*3/UL (ref 1.2–4.8)
LYMPHOCYTES NFR BLD AUTO: 13.3 %
MAGNESIUM SERPL-MCNC: 1.11 MG/DL (ref 1.6–2.4)
MCH RBC QN AUTO: 28 PG (ref 26–34)
MCHC RBC AUTO-ENTMCNC: 31.9 G/DL (ref 32–36)
MCV RBC AUTO: 88 FL (ref 80–100)
MONOCYTES # BLD AUTO: 1.15 X10*3/UL (ref 0.1–1)
MONOCYTES NFR BLD AUTO: 13.3 %
NEUTROPHILS # BLD AUTO: 6.18 X10*3/UL (ref 1.2–7.7)
NEUTROPHILS NFR BLD AUTO: 71.7 %
NRBC BLD-RTO: 0 /100 WBCS (ref 0–0)
PLATELET # BLD AUTO: 240 X10*3/UL (ref 150–450)
POTASSIUM SERPL-SCNC: 3.4 MMOL/L (ref 3.5–5.3)
PROT SERPL-MCNC: 7.4 G/DL (ref 6.4–8.2)
RBC # BLD AUTO: 3.53 X10*6/UL (ref 4–5.2)
SODIUM SERPL-SCNC: 135 MMOL/L (ref 136–145)
WBC # BLD AUTO: 8.6 X10*3/UL (ref 4.4–11.3)

## 2024-08-27 PROCEDURE — 85025 COMPLETE CBC W/AUTO DIFF WBC: CPT

## 2024-08-27 PROCEDURE — 96523 IRRIG DRUG DELIVERY DEVICE: CPT

## 2024-08-27 PROCEDURE — 2500000004 HC RX 250 GENERAL PHARMACY W/ HCPCS (ALT 636 FOR OP/ED): Performed by: STUDENT IN AN ORGANIZED HEALTH CARE EDUCATION/TRAINING PROGRAM

## 2024-08-27 PROCEDURE — 80053 COMPREHEN METABOLIC PANEL: CPT

## 2024-08-27 PROCEDURE — 83735 ASSAY OF MAGNESIUM: CPT

## 2024-08-27 RX ORDER — HEPARIN 100 UNIT/ML
500 SYRINGE INTRAVENOUS AS NEEDED
Status: DISCONTINUED | OUTPATIENT
Start: 2024-08-27 | End: 2024-08-27 | Stop reason: HOSPADM

## 2024-08-27 RX ORDER — HEPARIN 100 UNIT/ML
500 SYRINGE INTRAVENOUS AS NEEDED
Status: CANCELLED | OUTPATIENT
Start: 2024-08-27

## 2024-08-27 RX ORDER — HEPARIN SODIUM,PORCINE/PF 10 UNIT/ML
50 SYRINGE (ML) INTRAVENOUS AS NEEDED
Status: CANCELLED | OUTPATIENT
Start: 2024-08-27

## 2024-08-27 NOTE — PROGRESS NOTES
Patient here for port draw prior to treatment tomorrow. Patient aware of appointment time tomorrow.

## 2024-08-28 ENCOUNTER — OFFICE VISIT (OUTPATIENT)
Dept: HEMATOLOGY/ONCOLOGY | Facility: CLINIC | Age: 69
End: 2024-08-28
Payer: COMMERCIAL

## 2024-08-28 ENCOUNTER — INFUSION (OUTPATIENT)
Dept: HEMATOLOGY/ONCOLOGY | Facility: CLINIC | Age: 69
End: 2024-08-28
Payer: COMMERCIAL

## 2024-08-28 VITALS
DIASTOLIC BLOOD PRESSURE: 55 MMHG | OXYGEN SATURATION: 98 % | RESPIRATION RATE: 16 BRPM | TEMPERATURE: 98.4 F | SYSTOLIC BLOOD PRESSURE: 138 MMHG | HEART RATE: 70 BPM

## 2024-08-28 VITALS
DIASTOLIC BLOOD PRESSURE: 90 MMHG | BODY MASS INDEX: 24.94 KG/M2 | SYSTOLIC BLOOD PRESSURE: 137 MMHG | HEART RATE: 90 BPM | TEMPERATURE: 97.3 F | WEIGHT: 154.98 LBS | OXYGEN SATURATION: 98 % | RESPIRATION RATE: 16 BRPM

## 2024-08-28 DIAGNOSIS — L27.0 RASH, DRUG: ICD-10-CM

## 2024-08-28 DIAGNOSIS — G89.3 CANCER ASSOCIATED PAIN: ICD-10-CM

## 2024-08-28 DIAGNOSIS — C78.7 METASTATIC COLON CANCER TO LIVER (MULTI): Primary | ICD-10-CM

## 2024-08-28 DIAGNOSIS — D64.9 ANEMIA, UNSPECIFIED TYPE: ICD-10-CM

## 2024-08-28 DIAGNOSIS — C80.1 ANXIETY ASSOCIATED WITH CANCER DIAGNOSIS (MULTI): ICD-10-CM

## 2024-08-28 DIAGNOSIS — C18.9 MALIGNANT NEOPLASM OF COLON, UNSPECIFIED PART OF COLON (MULTI): ICD-10-CM

## 2024-08-28 DIAGNOSIS — C18.9 METASTATIC COLON CANCER TO LIVER (MULTI): Primary | ICD-10-CM

## 2024-08-28 DIAGNOSIS — C18.9 METASTATIC COLON CANCER TO LIVER (MULTI): ICD-10-CM

## 2024-08-28 DIAGNOSIS — F41.1 ANXIETY ASSOCIATED WITH CANCER DIAGNOSIS (MULTI): ICD-10-CM

## 2024-08-28 DIAGNOSIS — C78.7 METASTATIC COLON CANCER TO LIVER (MULTI): ICD-10-CM

## 2024-08-28 PROBLEM — D50.9 IRON DEFICIENCY ANEMIA: Status: ACTIVE | Noted: 2024-08-28

## 2024-08-28 LAB
CEA SERPL-MCNC: 61.2 UG/L
FERRITIN SERPL-MCNC: 443 NG/ML (ref 8–150)
IRON SATN MFR SERPL: 8 % (ref 25–45)
IRON SERPL-MCNC: 19 UG/DL (ref 35–150)
TIBC SERPL-MCNC: 228 UG/DL (ref 240–445)
UIBC SERPL-MCNC: 209 UG/DL (ref 110–370)

## 2024-08-28 PROCEDURE — 83540 ASSAY OF IRON: CPT

## 2024-08-28 PROCEDURE — 96413 CHEMO IV INFUSION 1 HR: CPT

## 2024-08-28 PROCEDURE — 2500000004 HC RX 250 GENERAL PHARMACY W/ HCPCS (ALT 636 FOR OP/ED): Performed by: STUDENT IN AN ORGANIZED HEALTH CARE EDUCATION/TRAINING PROGRAM

## 2024-08-28 PROCEDURE — 99215 OFFICE O/P EST HI 40 MIN: CPT | Mod: 25 | Performed by: STUDENT IN AN ORGANIZED HEALTH CARE EDUCATION/TRAINING PROGRAM

## 2024-08-28 PROCEDURE — 96367 TX/PROPH/DG ADDL SEQ IV INF: CPT

## 2024-08-28 PROCEDURE — 96375 TX/PRO/DX INJ NEW DRUG ADDON: CPT | Mod: INF

## 2024-08-28 PROCEDURE — 1126F AMNT PAIN NOTED NONE PRSNT: CPT | Performed by: STUDENT IN AN ORGANIZED HEALTH CARE EDUCATION/TRAINING PROGRAM

## 2024-08-28 PROCEDURE — 96417 CHEMO IV INFUS EACH ADDL SEQ: CPT

## 2024-08-28 PROCEDURE — 99215 OFFICE O/P EST HI 40 MIN: CPT | Performed by: STUDENT IN AN ORGANIZED HEALTH CARE EDUCATION/TRAINING PROGRAM

## 2024-08-28 PROCEDURE — 1159F MED LIST DOCD IN RCRD: CPT | Performed by: STUDENT IN AN ORGANIZED HEALTH CARE EDUCATION/TRAINING PROGRAM

## 2024-08-28 PROCEDURE — 82378 CARCINOEMBRYONIC ANTIGEN: CPT

## 2024-08-28 PROCEDURE — 96415 CHEMO IV INFUSION ADDL HR: CPT

## 2024-08-28 PROCEDURE — 2500000002 HC RX 250 W HCPCS SELF ADMINISTERED DRUGS (ALT 637 FOR MEDICARE OP, ALT 636 FOR OP/ED)

## 2024-08-28 PROCEDURE — 82728 ASSAY OF FERRITIN: CPT

## 2024-08-28 PROCEDURE — 96416 CHEMO PROLONG INFUSE W/PUMP: CPT

## 2024-08-28 RX ORDER — POTASSIUM CHLORIDE 20 MEQ/1
20 TABLET, EXTENDED RELEASE ORAL ONCE
Status: CANCELLED | OUTPATIENT
Start: 2024-08-28

## 2024-08-28 RX ORDER — DIPHENHYDRAMINE HYDROCHLORIDE 50 MG/ML
50 INJECTION INTRAMUSCULAR; INTRAVENOUS AS NEEDED
Status: DISCONTINUED | OUTPATIENT
Start: 2024-08-28 | End: 2024-08-28 | Stop reason: HOSPADM

## 2024-08-28 RX ORDER — DIPHENHYDRAMINE HYDROCHLORIDE 50 MG/ML
50 INJECTION INTRAMUSCULAR; INTRAVENOUS AS NEEDED
Status: CANCELLED | OUTPATIENT
Start: 2024-08-28

## 2024-08-28 RX ORDER — FAMOTIDINE 10 MG/ML
20 INJECTION INTRAVENOUS ONCE AS NEEDED
Status: DISCONTINUED | OUTPATIENT
Start: 2024-08-28 | End: 2024-08-28 | Stop reason: HOSPADM

## 2024-08-28 RX ORDER — PALONOSETRON 0.05 MG/ML
0.25 INJECTION, SOLUTION INTRAVENOUS ONCE
Status: CANCELLED | OUTPATIENT
Start: 2024-08-28

## 2024-08-28 RX ORDER — PROCHLORPERAZINE MALEATE 10 MG
10 TABLET ORAL EVERY 6 HOURS PRN
Status: DISCONTINUED | OUTPATIENT
Start: 2024-08-28 | End: 2024-08-28 | Stop reason: HOSPADM

## 2024-08-28 RX ORDER — MINOCYCLINE HYDROCHLORIDE 100 MG/1
100 CAPSULE ORAL EVERY 24 HOURS
Qty: 30 CAPSULE | Refills: 2 | Status: SHIPPED | OUTPATIENT
Start: 2024-08-28

## 2024-08-28 RX ORDER — PROCHLORPERAZINE EDISYLATE 5 MG/ML
10 INJECTION INTRAMUSCULAR; INTRAVENOUS EVERY 6 HOURS PRN
Status: CANCELLED | OUTPATIENT
Start: 2024-08-28

## 2024-08-28 RX ORDER — MAGNESIUM SULFATE HEPTAHYDRATE 40 MG/ML
2 INJECTION, SOLUTION INTRAVENOUS ONCE AS NEEDED
Status: CANCELLED | OUTPATIENT
Start: 2024-08-28

## 2024-08-28 RX ORDER — CLINDAMYCIN PHOSPHATE 10 UG/ML
LOTION TOPICAL
Qty: 60 ML | Refills: 3 | Status: SHIPPED | OUTPATIENT
Start: 2024-08-28

## 2024-08-28 RX ORDER — ALBUTEROL SULFATE 0.83 MG/ML
3 SOLUTION RESPIRATORY (INHALATION) AS NEEDED
Status: CANCELLED | OUTPATIENT
Start: 2024-08-28

## 2024-08-28 RX ORDER — HEPARIN SODIUM,PORCINE/PF 10 UNIT/ML
50 SYRINGE (ML) INTRAVENOUS AS NEEDED
Status: CANCELLED | OUTPATIENT
Start: 2024-08-28

## 2024-08-28 RX ORDER — DOXYCYCLINE 100 MG/1
100 CAPSULE ORAL 2 TIMES DAILY
Qty: 60 CAPSULE | Refills: 3 | Status: CANCELLED | OUTPATIENT
Start: 2024-08-28

## 2024-08-28 RX ORDER — LORAZEPAM 0.5 MG/1
0.25 TABLET ORAL EVERY 4 HOURS PRN
Qty: 30 TABLET | Refills: 0 | Status: SHIPPED | OUTPATIENT
Start: 2024-08-28

## 2024-08-28 RX ORDER — PALONOSETRON 0.05 MG/ML
0.25 INJECTION, SOLUTION INTRAVENOUS ONCE
Status: COMPLETED | OUTPATIENT
Start: 2024-08-28 | End: 2024-08-28

## 2024-08-28 RX ORDER — EPINEPHRINE 0.3 MG/.3ML
0.3 INJECTION SUBCUTANEOUS EVERY 5 MIN PRN
Status: DISCONTINUED | OUTPATIENT
Start: 2024-08-28 | End: 2024-08-28 | Stop reason: HOSPADM

## 2024-08-28 RX ORDER — MAGNESIUM SULFATE HEPTAHYDRATE 40 MG/ML
4 INJECTION, SOLUTION INTRAVENOUS ONCE AS NEEDED
Status: CANCELLED | OUTPATIENT
Start: 2024-08-28

## 2024-08-28 RX ORDER — MINOCYCLINE HYDROCHLORIDE 100 MG/1
100 TABLET ORAL EVERY 24 HOURS
Qty: 28 TABLET | Refills: 3 | Status: SHIPPED | OUTPATIENT
Start: 2024-08-28 | End: 2024-08-28

## 2024-08-28 RX ORDER — POTASSIUM CHLORIDE 20 MEQ/1
20 TABLET, EXTENDED RELEASE ORAL ONCE
Status: COMPLETED | OUTPATIENT
Start: 2024-08-28 | End: 2024-08-28

## 2024-08-28 RX ORDER — PROCHLORPERAZINE EDISYLATE 5 MG/ML
10 INJECTION INTRAMUSCULAR; INTRAVENOUS EVERY 6 HOURS PRN
Status: DISCONTINUED | OUTPATIENT
Start: 2024-08-28 | End: 2024-08-28 | Stop reason: HOSPADM

## 2024-08-28 RX ORDER — POTASSIUM CHLORIDE 20 MEQ/1
TABLET, EXTENDED RELEASE ORAL
Status: COMPLETED
Start: 2024-08-28 | End: 2024-08-28

## 2024-08-28 RX ORDER — MINOCYCLINE HYDROCHLORIDE 100 MG/1
100 TABLET ORAL EVERY 24 HOURS
Qty: 28 TABLET | Refills: 3 | Status: SHIPPED
Start: 2024-08-28 | End: 2024-08-28

## 2024-08-28 RX ORDER — HYDROCORTISONE 1 %
CREAM (GRAM) TOPICAL
Qty: 453.6 G | Refills: 3 | Status: SHIPPED | OUTPATIENT
Start: 2024-08-28

## 2024-08-28 RX ORDER — EPINEPHRINE 0.3 MG/.3ML
0.3 INJECTION SUBCUTANEOUS EVERY 5 MIN PRN
Status: CANCELLED | OUTPATIENT
Start: 2024-08-28

## 2024-08-28 RX ORDER — MAGNESIUM SULFATE HEPTAHYDRATE 40 MG/ML
INJECTION, SOLUTION INTRAVENOUS
Status: DISCONTINUED
Start: 2024-08-28 | End: 2024-08-28 | Stop reason: HOSPADM

## 2024-08-28 RX ORDER — LORAZEPAM 2 MG/ML
1 INJECTION INTRAMUSCULAR AS NEEDED
Status: CANCELLED | OUTPATIENT
Start: 2024-08-28

## 2024-08-28 RX ORDER — MAGNESIUM SULFATE HEPTAHYDRATE 40 MG/ML
2 INJECTION, SOLUTION INTRAVENOUS ONCE AS NEEDED
Status: DISCONTINUED | OUTPATIENT
Start: 2024-08-28 | End: 2024-08-28 | Stop reason: HOSPADM

## 2024-08-28 RX ORDER — ALBUTEROL SULFATE 0.83 MG/ML
3 SOLUTION RESPIRATORY (INHALATION) AS NEEDED
Status: DISCONTINUED | OUTPATIENT
Start: 2024-08-28 | End: 2024-08-28 | Stop reason: HOSPADM

## 2024-08-28 RX ORDER — FAMOTIDINE 10 MG/ML
20 INJECTION INTRAVENOUS ONCE AS NEEDED
Status: CANCELLED | OUTPATIENT
Start: 2024-08-28

## 2024-08-28 RX ORDER — PROCHLORPERAZINE MALEATE 10 MG
10 TABLET ORAL EVERY 6 HOURS PRN
Status: CANCELLED | OUTPATIENT
Start: 2024-08-28

## 2024-08-28 RX ORDER — HEPARIN 100 UNIT/ML
500 SYRINGE INTRAVENOUS AS NEEDED
Status: CANCELLED | OUTPATIENT
Start: 2024-08-28

## 2024-08-28 RX ORDER — HEPARIN 100 UNIT/ML
500 SYRINGE INTRAVENOUS AS NEEDED
Status: DISCONTINUED | OUTPATIENT
Start: 2024-08-28 | End: 2024-08-28 | Stop reason: HOSPADM

## 2024-08-28 ASSESSMENT — PAIN SCALES - GENERAL: PAINLEVEL: 0-NO PAIN

## 2024-08-28 NOTE — PROGRESS NOTES
"Patient ambulated into clinic for follow up with Dr. Hu alone. Medications and allergies reviewed.     Cold Sensitivity x a couple days after treatment.   Denies Nausea and Vomiting but \"stated I just feel sick all the time\".   Exhausted all the time, having trouble sleeping. Getting up almost every hour. \"Trazodone does not help, they are not working, I was taking 2 per night, I think they made my sleep pattern even worse\".  Appetite has been good, but food is tasting different and I am very sensitive to smell. Eating a lot of oatmeal and fruits.     \"I was hoping Dr. Hu could me some information on my markers, I do not feel like anything is working\". \"I am nervous to fly to [place]\".     Onco-psychology referral discussed.     CEA to be drawn today.     Panitumumab discussed and side effects reviewed.  Home going medications reviewed for purpose and how to use them.     Follow up in two weeks before next cycle.               "

## 2024-08-28 NOTE — PROGRESS NOTES
Pt educated on new medication panitumumab. Instructed pt to start the minocylcin tablet tonight and then take it twice daily while on the panitumumab. Pt verbalized understanding without further questions at this time. Reiterated importance of starting the minocyclin and taking it BID. Pt tolerated infusion without complications.

## 2024-08-28 NOTE — PROGRESS NOTES
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 Elana Dodd is a 68 yo F with PMH of HTN, RA (on leflunomide), HLD, GERD, s/p R nephrectomy (1998, donated kidney to sister), R breast cancer (8/20/20, ER+/SC+/HER2 1+ by IHC/AMARIS+) s/p R lumpectomy and R SLN (11/5/2020) followed by carboplatin/Taxotere/Herceptin x4C (stopped due to SE) and R RT (completed 7/2021) on letrozole (since 7/2021), and stage IIA colon cancer s/p laparoscopic sigmoid colectomy and colostomy and en bloc resection with appendectomy and L ureterolysis (7/6/2020) with subsequent pelvic RT (completed 10/15/2020) lost to follow-up found to have recurrent metastatic colon cancer to the liver.     She was previously diagnosed and treated for her breast and colon cancers in [place].      6/23/2020 - PET/CT - hypermetabolic mass in sigmoid colon & hypermet mass in R breast      7/6/2020 - sigmoid colon resection - pathology: Moderately differentiated invasive adenocarcinoma, 5.5 cm in diameter. Invades through muscularis propria focally into subserosal tissue. Marked adhesion of small bowel segments to colon without involvement of malignancy, +0/28 LN, negative margins, small focus of carcinoma in the serosa of margin furthest from this tumor (pT3 N0), at least stage IIA     8/12/2020 - BL mammogram & US: 3.6 cm mass in R breast     8/20/2020 - R breast biopsy - path: Invasive poorly differentiated ductal carcinoma. ER positive and SC positive and HER-2 by IHC is 1+, but by HER-2 AMARIS is positive. On MammaPrint testing malignancy is ER and SC positive and HER-2  positive, high risk, with a predicted 5-year metastasis free survival of 93% with chemotherapy and hormonal therapy versus 71% metastasis free survival with hormonal therapy alone.      10/15/2020 - Completed pelvic radiation      11/5/2020 - R lumpectomy and R SLNBx - path: 2.6 cm invasive ductal carcinoma, with all margins free of malignancy. The malignancy comes to within 1 mm of the closest deep margin. Perineural invasion is present. There is a focus of metastatic carcinoma, measuring 0.5 mm in 1 of 2 sentinel lymph nodes. pT2 pN1mic. Stage IIB     1/8/2021 - CT CAP - RLQ colostomy in tact, tiny nodules LLL nonspecific     5/11/2021 - CT CAP - stable small pulm nodules, worsening ventral hernia, interval takedown of LLQ ostomy, no evidence of met disease      Transitioned her care to her PCP where she was being prescribed letrozole. Has not had repeat mammogram or colonoscopy.     4/10/24 - Re-established care with medical oncology with Dr. Riccardo Blake at [facility]. Had not been seen by medical oncology since August 2021. Letrozole had been refilled by PCP. CT CAP, colonoscopy, CEA, CA 19-9, CA 27.29 were all ordered.      6/18/24 - CT A/P w contrast - large hepatic mass (7.5x8.7cm) involving both anterior segment of R lobe as well as medial segment of L lobe likely related to metastatic disease possibly colon cancer however met breast CA not excluded w hx; enlarged mohit hepatic LN likely metastatic, probably stable sidebranch IPMN and in pancreas   CT chest non con - stable tiny nodule superolateral LLL (3mm)     6/24/24 - Dr. Blake discussed imaging. Biopsy ordered.      7/1/24 - CT guided biopsy of liver lesion, path showed adenocarcinoma, metastatic from patient's known colonic primary, IHC +CK20, CDX2, SATB2 while negative for GATA3 and CK7, pMMR     Current Treatment:    7/24 - supposed to start FOLFOX. Had issues with skin open over port line.    7/31/24 - present: FOLFOX    Interval History:    Ms. Dodd  is here today by herself for C3. Tolerating treatment really well. Cold sensitivity for 2 days. Main issue is inability to sleep. She barely sleeps each night and is so exhausted she's having issues functioning. Already discussed sleep hygiene last visit. She's tried 50-100mg trazodone which doesn't help. She is very anxious. She is worried the treatment isn't going to work, so she is unable to relax. Shared today that this is especially hard because it's her second time going through colon cancer cancer diagnosis. She previously declined escitalopram. Does get some relief with lorazepam. Hydroxyzine hasn't been helpful. Pain has been better over the past few weeks. Using less oxycodone. Cancelled her supp onc appt last week due to her daughter being [place] to [place] [facility] [place] She has some nausea which is constant, and she associates with her exhaustion and feeling unwell.         ROS:  10-pt ROS reviewed and negative except as mentioned above.     PMHx / FHx / PSHx: reviewed and are accurate    Medications: below was reviewed and is accurate    Current Outpatient Medications:     allopurinol (Zyloprim) 100 mg tablet, Take 3 tablets (300 mg) by mouth 2 times a day., Disp: , Rfl:     amLODIPine (Norvasc) 5 mg tablet, Take 1 tablet (5 mg) by mouth once daily., Disp: , Rfl:     atorvastatin (Lipitor) 20 mg tablet, Take 1 tablet (20 mg) by mouth once daily., Disp: , Rfl:     cholecalciferol (Vitamin D-3) 25 MCG (1000 UT) capsule, Take 1 capsule (25 mcg) by mouth once daily., Disp: , Rfl:     ibuprofen 800 mg tablet, Take 1 tablet (800 mg) by mouth every 8 hours if needed., Disp: , Rfl:     letrozole (Femara) 2.5 mg tablet, Take 1 tablet (2.5 mg total) by mouth once daily.  Take with or without food., Disp: , Rfl:     omeprazole (PriLOSEC) 40 mg DR capsule, Take 1 capsule (40 mg) by mouth. Do not crush or chew., Disp: , Rfl:     oxyCODONE (Roxicodone) 15 mg immediate release tablet, Take 1 tablet (15 mg) by mouth every  6 hours if needed (pain). G89.3, Disp: 20 tablet, Rfl: 0    prochlorperazine (Compazine) 10 mg tablet, Take 1 tablet (10 mg) by mouth every 6 hours if needed for nausea or vomiting., Disp: 30 tablet, Rfl: 5    clindamycin (Cleocin T) 1 % lotion, Apply topically to affected area twice daily., Disp: 60 mL, Rfl: 3    escitalopram (Lexapro) 10 mg tablet, Take 1 tablet (10 mg) by mouth once daily. (Patient not taking: Reported on 8/14/2024), Disp: 30 tablet, Rfl: 2    hydrocortisone 1 % cream, Apply topically to face, hands, feet, neck, back, and chest once daily at bedtime for rash prevention., Disp: 453.6 g, Rfl: 3    hydrOXYzine HCL (Atarax) 10 mg tablet, Take 1 tablet (10 mg) by mouth every 6 hours if needed for anxiety. (Patient not taking: Reported on 8/14/2024), Disp: 30 tablet, Rfl: 0    leflunomide (Arava) 10 mg tablet, Take by mouth once daily., Disp: , Rfl:     loperamide (Imodium) 2 mg capsule, Take 2 capsules (4 mg) by mouth with the first episode of diarrhea and 1 capsule (2 mg) by mouth with any additional episodes. Maximum 8 capsules (16 mg) per day. (Patient not taking: Reported on 8/28/2024), Disp: 30 capsule, Rfl: 2    LORazepam (Ativan) 0.5 mg tablet, Take 0.5 tablets (0.25 mg) by mouth every 4 hours if needed for anxiety., Disp: 30 tablet, Rfl: 0    minocycline (Dynacin) 100 mg tablet, Take 1 tablet (100 mg) by mouth once every 24 hours. For rash prevention, Disp: 28 tablet, Rfl: 3    OLANZapine (ZyPREXA) 5 mg tablet, Take 1 tablet (5 mg) by mouth once daily at bedtime. 1 tab at bedtime for 3 days, starting the night of chemo (Patient not taking: Reported on 8/28/2024), Disp: 30 tablet, Rfl: 3    ondansetron (Zofran) 8 mg tablet, Take 1 tablet (8 mg) by mouth every 8 hours if needed for nausea or vomiting. (Patient not taking: Reported on 8/28/2024), Disp: 30 tablet, Rfl: 5    traZODone (Desyrel) 50 mg tablet, Take 1 tablet (50 mg) by mouth once daily at bedtime. (Patient not taking: Reported on  8/28/2024), Disp: 30 tablet, Rfl: 0    Current Facility-Administered Medications:     magnesium sulfate in sterile water for injection  - Omnicell Override Pull, , , ,     Facility-Administered Medications Ordered in Other Visits:     albuterol 2.5 mg /3 mL (0.083 %) nebulizer solution 3 mL, 3 mL, nebulization, PRN, Felipa Hu MD    dexAMETHasone (Decadron) in dextrose 5% 50 mL IV 12 mg, 12 mg, intravenous, Once, Felipa Hu MD    dextrose 5 % in water (D5W) bolus, 500 mL, intravenous, PRN, Felipa Hu MD    diphenhydrAMINE (BENADryl) injection 50 mg, 50 mg, intravenous, PRN, Felipa Hu MD    EPINEPHrine (Epipen) injection syringe 0.3 mg, 0.3 mg, intramuscular, q5 min PRN, Felipa Hu MD    famotidine PF (Pepcid) injection 20 mg, 20 mg, intravenous, Once PRN, Felipa Hu MD    fluorouracil (Adrucil) 2,400 mg/m2 = 4,400 mg in sodium chloride 0.9% 138 mL IV via Home Infusion, 2,400 mg/m2 (Treatment Plan Recorded), intravenous, Once, Felipa Hu MD    magnesium sulfate 2 g in sterile water for injection 50 mL, 2 g, intravenous, Once PRN, Felipa Hu MD, Last Rate: 50 mL/hr at 08/28/24 1044, 2 g at 08/28/24 1044    methylPREDNISolone sod succinate (SOLU-Medrol) 40 mg/mL injection 40 mg, 40 mg, intravenous, PRN, Felipa Hu MD    OXALIplatin (Eloxatin) 155 mg in dextrose 5% 578 mL IV, 85 mg/m2 (Treatment Plan Recorded), intravenous, Once, Felipa Hu MD    palonosetron (Aloxi) injection 250 mcg, 0.25 mg, intravenous, Once, Felipa Hu MD    panitumumab (Vectibix) 440 mg in sodium chloride 0.9% 132 mL IV, 6 mg/kg (Treatment Plan Recorded), intravenous, Once, Felipa Hu MD    prochlorperazine (Compazine) injection 10 mg, 10 mg, intravenous, q6h PRN, Felipa Hu MD    prochlorperazine (Compazine) tablet 10 mg, 10 mg, oral, q6h PRN, Felipa Hu MD    sodium chloride 0.9 % bolus 500 mL, 500 mL, intravenous, PRN, Felipa L Conces,  MD    Physical exam:  Vitals:    24 0906   BP: 137/90   BP Location: Right arm   Patient Position: Sitting   BP Cuff Size: Adult long   Pulse: 90   Resp: 16   Temp: 36.3 °C (97.3 °F)   TempSrc: Temporal   SpO2: 98%   Weight: 70.3 kg (154 lb 15.7 oz)   ECO  GEN: NAD, appears tired, tearful, anxious   LUNGS: normal respiratory effort  EXT: No deformities or edema  NEURO: Grossly intact. No focal deficits.  HEME: No signs of easy bruising or active bleeding.  PSYCH: Appropriate mood and affect    Laboratory review:    24 labs reviewed   K 3.4     ASSESSMENT AND PLAN:     Ms. Elana Dodd is a 68 yo F with PMH of HTN, RA (on leflunomide), HLD, GERD, s/p R nephrectomy (, donated kidney to sister), R breast cancer (20, ER+/HI+/HER2 1+ by IHC/AMARIS+) s/p R lumpectomy and R SLN (2020) followed by carboplatin/Taxotere/Herceptin x4C (stopped due to SE) and R RT (completed 2021) on letrozole (since 2021), and stage IIA colon cancer s/p laparoscopic sigmoid colectomy and colostomy and en bloc resection with appendectomy and L ureterolysis (2020) with subsequent pelvic RT (completed 10/15/2020) lost to follow-up found to have recurrent metastatic colon cancer to the liver here today for follow-up.     Ms. Dodd is very anxious today. She's worried the treatment isn't working. She isn't sleeping. She is declining escitalopram. I will refill lorazepam. I also recommended onco-psychiatry/psychology, and she is agreeable.     Metastatic colon cancer to the liver: Teresa NGS testing done at [facility] shows KRAS WT. She tolerated first 2 cycles FOLFOX well, and I recommended adding on panitumumab. She is agreeable. Labs and toxicities with acceptable limits. Proceed with cycle 3 FOLFOX + panitumumab. She will be gone for daughter's wedding in [place] -.   Anxiety: Hydroxyzine 20mg PRN and lorazepam 0.5mg PRN. Refill sent for lorazepam. Declined escitalopram. Referral to onco-psych. I also  recommended she reach out to the [facility].   Hypokalemia: 20 mEq K PO in infusion   Drug-induced rash: Minocycline 100mg daily for rash prevention. Hydrocortisone cream and clindamycin lotion as needed on rash.   Pain (R rib and shoulder): Improved. Controlled with PRN oxycodone. Requested she call and reschedule supp onc appointment.   Constipation: Recommended taking senna and Miralax if taking oxycodone daily.   Nausea: Olanzapine nightly. Zofran and Compazine as needed.   Insomnia: Reiterated sleep hygiene - no screen time before bed. Olanzapine nightly. I do think this is from her uncontrolled anxiety.   Anemia: Checked iron labs. Consistent with iron deficiency anemia. Plan for IV iron with treatment next cycle.    RTC in 2 weeks for C4 FOLFOX + panitumumab.    Felipa Hu MD  Staff, Gastrointestinal Medical Oncology  [facility]

## 2024-08-29 ENCOUNTER — TELEPHONE (OUTPATIENT)
Dept: HEMATOLOGY/ONCOLOGY | Facility: CLINIC | Age: 69
End: 2024-08-29
Payer: COMMERCIAL

## 2024-08-30 ENCOUNTER — INFUSION (OUTPATIENT)
Dept: HEMATOLOGY/ONCOLOGY | Facility: CLINIC | Age: 69
End: 2024-08-30
Payer: COMMERCIAL

## 2024-08-30 VITALS
OXYGEN SATURATION: 98 % | HEART RATE: 101 BPM | SYSTOLIC BLOOD PRESSURE: 133 MMHG | TEMPERATURE: 97 F | DIASTOLIC BLOOD PRESSURE: 89 MMHG | RESPIRATION RATE: 18 BRPM

## 2024-08-30 DIAGNOSIS — C18.9 MALIGNANT NEOPLASM OF COLON, UNSPECIFIED PART OF COLON (MULTI): ICD-10-CM

## 2024-08-30 DIAGNOSIS — C78.7 METASTATIC COLON CANCER TO LIVER (MULTI): ICD-10-CM

## 2024-08-30 DIAGNOSIS — C18.9 METASTATIC COLON CANCER TO LIVER (MULTI): ICD-10-CM

## 2024-08-30 PROCEDURE — 2500000004 HC RX 250 GENERAL PHARMACY W/ HCPCS (ALT 636 FOR OP/ED): Performed by: STUDENT IN AN ORGANIZED HEALTH CARE EDUCATION/TRAINING PROGRAM

## 2024-08-30 PROCEDURE — 96523 IRRIG DRUG DELIVERY DEVICE: CPT

## 2024-08-30 RX ORDER — HEPARIN 100 UNIT/ML
500 SYRINGE INTRAVENOUS AS NEEDED
OUTPATIENT
Start: 2024-08-30

## 2024-08-30 RX ORDER — HEPARIN SODIUM,PORCINE/PF 10 UNIT/ML
50 SYRINGE (ML) INTRAVENOUS AS NEEDED
Status: DISCONTINUED | OUTPATIENT
Start: 2024-08-30 | End: 2024-08-30 | Stop reason: HOSPADM

## 2024-08-30 RX ORDER — HEPARIN 100 UNIT/ML
500 SYRINGE INTRAVENOUS AS NEEDED
Status: DISCONTINUED | OUTPATIENT
Start: 2024-08-30 | End: 2024-08-30 | Stop reason: HOSPADM

## 2024-08-30 RX ORDER — HEPARIN SODIUM,PORCINE/PF 10 UNIT/ML
50 SYRINGE (ML) INTRAVENOUS AS NEEDED
OUTPATIENT
Start: 2024-08-30

## 2024-08-30 ASSESSMENT — PAIN SCALES - GENERAL: PAINLEVEL: 0-NO PAIN

## 2024-08-30 NOTE — PROGRESS NOTES
Pt arrived ambulatory for pump disconnect. Pt denies any new or worsening symptoms. Chemo bag empty. Pump disconnected. Blood return obtained from port. Port flushed per orders, deaccessed and band aid applied. Pt denied AVS but we did discuss upcoming appts. Pt left infusion ambulatory.

## 2024-08-31 ENCOUNTER — NURSE TRIAGE (OUTPATIENT)
Dept: HEMATOLOGY/ONCOLOGY | Facility: HOSPITAL | Age: 69
End: 2024-08-31
Payer: COMMERCIAL

## 2024-08-31 NOTE — TELEPHONE ENCOUNTER
Patient states was unable to sleep last night.  has been having trouble sleeping for sometime but last night was unable to sleep at all. Took lorazepam twice last night, states it did not help.  is not taking any other anxiety or sleeping medications. Patient is tearful and states 56 hrs of chemo and then not being able to sleep is too much. Message sent to fellow on call.

## 2024-08-31 NOTE — TELEPHONE ENCOUNTER
Discussed with patient per Dr Khan, lorazepam is the strongest thing we can give and she already has and is taking this. Advised per Dr Khan that the only thing that can be added to this is melatonin because over sedation can cause respiratory depression. Patient verbalized understanding and states will probably go to ED then since she knows this will not work.

## 2024-09-03 LAB
DPYD GENE MUT ANL BLD/T: NORMAL
ELECTRONICALLY SIGNED BY: NORMAL

## 2024-09-03 NOTE — TELEPHONE ENCOUNTER
Phone call to patient to reschedule NPV with supportive oncology. I spoke to patient and she is agreeable to reschedule, asks for a call a little later when she can look at her calendar. I will call her back then.

## 2024-09-03 NOTE — TELEPHONE ENCOUNTER
I left patient VM providing our call back number to call and schedule NPV with supportive oncology. I will try connecting with her again later today if no return call.

## 2024-09-03 NOTE — TELEPHONE ENCOUNTER
Phone call to patient who asks for call back in the morning to schedule. I will call patient then.

## 2024-09-04 NOTE — TELEPHONE ENCOUNTER
VM left for patient asking for a call back to schedule. Call back number provided. I will try reaching back out throughout the day if we do not hear back.

## 2024-09-04 NOTE — TELEPHONE ENCOUNTER
Another VM left for patient providing our call back number to schedule NPV with supportive oncology.

## 2024-09-06 ENCOUNTER — TELEPHONE (OUTPATIENT)
Dept: HEMATOLOGY/ONCOLOGY | Facility: CLINIC | Age: 69
End: 2024-09-06
Payer: COMMERCIAL

## 2024-09-06 DIAGNOSIS — C18.9 MALIGNANT NEOPLASM OF COLON, UNSPECIFIED PART OF COLON (MULTI): ICD-10-CM

## 2024-09-06 DIAGNOSIS — C18.9 METASTATIC COLON CANCER TO LIVER (MULTI): ICD-10-CM

## 2024-09-06 DIAGNOSIS — C80.1 ANXIETY ASSOCIATED WITH CANCER DIAGNOSIS (MULTI): ICD-10-CM

## 2024-09-06 DIAGNOSIS — C78.7 METASTATIC COLON CANCER TO LIVER (MULTI): ICD-10-CM

## 2024-09-06 DIAGNOSIS — F41.1 ANXIETY ASSOCIATED WITH CANCER DIAGNOSIS (MULTI): ICD-10-CM

## 2024-09-06 DIAGNOSIS — F41.9 ANXIETY: ICD-10-CM

## 2024-09-06 RX ORDER — LORAZEPAM 0.5 MG/1
0.25 TABLET ORAL EVERY 8 HOURS PRN
Qty: 30 TABLET | Refills: 0 | Status: SHIPPED | OUTPATIENT
Start: 2024-09-06

## 2024-09-06 RX ORDER — OXYCODONE HYDROCHLORIDE 15 MG/1
15 TABLET ORAL EVERY 6 HOURS PRN
Qty: 30 TABLET | Refills: 0 | Status: SHIPPED | OUTPATIENT
Start: 2024-09-06

## 2024-09-06 NOTE — TELEPHONE ENCOUNTER
I was asked by oncology team to try reaching patient to schedule. VM left asking for a return call.

## 2024-09-06 NOTE — TELEPHONE ENCOUNTER
Spoke with patient this afternoon. She is scheduled for follow up with pain management on 9/12/24. She is aware that this is the team that will follow and refill the medications requested today.     She verbalizes understanding and plans to report to the scheduled visit on 9/12/24.

## 2024-09-10 ENCOUNTER — LAB (OUTPATIENT)
Dept: HEMATOLOGY/ONCOLOGY | Facility: CLINIC | Age: 69
End: 2024-09-10
Payer: COMMERCIAL

## 2024-09-10 DIAGNOSIS — C18.9 METASTATIC COLON CANCER TO LIVER (MULTI): ICD-10-CM

## 2024-09-10 DIAGNOSIS — C18.9 MALIGNANT NEOPLASM OF COLON, UNSPECIFIED PART OF COLON (MULTI): ICD-10-CM

## 2024-09-10 DIAGNOSIS — C78.7 METASTATIC COLON CANCER TO LIVER (MULTI): ICD-10-CM

## 2024-09-10 LAB
ALBUMIN SERPL BCP-MCNC: 3.7 G/DL (ref 3.4–5)
ALP SERPL-CCNC: 211 U/L (ref 33–136)
ALT SERPL W P-5'-P-CCNC: 21 U/L (ref 7–45)
ANION GAP SERPL CALC-SCNC: 15 MMOL/L (ref 10–20)
AST SERPL W P-5'-P-CCNC: 38 U/L (ref 9–39)
BASOPHILS # BLD AUTO: 0.04 X10*3/UL (ref 0–0.1)
BASOPHILS NFR BLD AUTO: 0.6 %
BILIRUB SERPL-MCNC: 0.3 MG/DL (ref 0–1.2)
BUN SERPL-MCNC: 12 MG/DL (ref 6–23)
CALCIUM SERPL-MCNC: 8.9 MG/DL (ref 8.6–10.3)
CHLORIDE SERPL-SCNC: 102 MMOL/L (ref 98–107)
CO2 SERPL-SCNC: 26 MMOL/L (ref 21–32)
CREAT SERPL-MCNC: 0.64 MG/DL (ref 0.5–1.05)
EGFRCR SERPLBLD CKD-EPI 2021: >90 ML/MIN/1.73M*2
EOSINOPHIL # BLD AUTO: 0.08 X10*3/UL (ref 0–0.7)
EOSINOPHIL NFR BLD AUTO: 1.2 %
ERYTHROCYTE [DISTWIDTH] IN BLOOD BY AUTOMATED COUNT: 17.6 % (ref 11.5–14.5)
GLUCOSE SERPL-MCNC: 99 MG/DL (ref 74–99)
HCT VFR BLD AUTO: 31.1 % (ref 36–46)
HGB BLD-MCNC: 10 G/DL (ref 12–16)
IMM GRANULOCYTES # BLD AUTO: 0.02 X10*3/UL (ref 0–0.7)
IMM GRANULOCYTES NFR BLD AUTO: 0.3 % (ref 0–0.9)
LYMPHOCYTES # BLD AUTO: 1.17 X10*3/UL (ref 1.2–4.8)
LYMPHOCYTES NFR BLD AUTO: 17.2 %
MAGNESIUM SERPL-MCNC: 1.13 MG/DL (ref 1.6–2.4)
MCH RBC QN AUTO: 28.7 PG (ref 26–34)
MCHC RBC AUTO-ENTMCNC: 32.2 G/DL (ref 32–36)
MCV RBC AUTO: 89 FL (ref 80–100)
MONOCYTES # BLD AUTO: 1 X10*3/UL (ref 0.1–1)
MONOCYTES NFR BLD AUTO: 14.7 %
NEUTROPHILS # BLD AUTO: 4.5 X10*3/UL (ref 1.2–7.7)
NEUTROPHILS NFR BLD AUTO: 66 %
NRBC BLD-RTO: 0 /100 WBCS (ref 0–0)
PLATELET # BLD AUTO: 189 X10*3/UL (ref 150–450)
POTASSIUM SERPL-SCNC: 3.7 MMOL/L (ref 3.5–5.3)
PROT SERPL-MCNC: 7.1 G/DL (ref 6.4–8.2)
RBC # BLD AUTO: 3.49 X10*6/UL (ref 4–5.2)
SODIUM SERPL-SCNC: 139 MMOL/L (ref 136–145)
WBC # BLD AUTO: 6.8 X10*3/UL (ref 4.4–11.3)

## 2024-09-10 PROCEDURE — 2500000004 HC RX 250 GENERAL PHARMACY W/ HCPCS (ALT 636 FOR OP/ED): Mod: SE | Performed by: STUDENT IN AN ORGANIZED HEALTH CARE EDUCATION/TRAINING PROGRAM

## 2024-09-10 PROCEDURE — 83735 ASSAY OF MAGNESIUM: CPT

## 2024-09-10 PROCEDURE — 36591 DRAW BLOOD OFF VENOUS DEVICE: CPT

## 2024-09-10 PROCEDURE — 85025 COMPLETE CBC W/AUTO DIFF WBC: CPT

## 2024-09-10 PROCEDURE — 80053 COMPREHEN METABOLIC PANEL: CPT

## 2024-09-10 RX ORDER — HEPARIN SODIUM,PORCINE/PF 10 UNIT/ML
50 SYRINGE (ML) INTRAVENOUS AS NEEDED
Status: DISCONTINUED | OUTPATIENT
Start: 2024-09-10 | End: 2024-09-10 | Stop reason: HOSPADM

## 2024-09-10 RX ORDER — HEPARIN SODIUM,PORCINE/PF 10 UNIT/ML
50 SYRINGE (ML) INTRAVENOUS AS NEEDED
Status: CANCELLED | OUTPATIENT
Start: 2024-09-10

## 2024-09-10 RX ORDER — HEPARIN 100 UNIT/ML
500 SYRINGE INTRAVENOUS AS NEEDED
Status: CANCELLED | OUTPATIENT
Start: 2024-09-10

## 2024-09-10 RX ORDER — HEPARIN 100 UNIT/ML
500 SYRINGE INTRAVENOUS AS NEEDED
Status: DISCONTINUED | OUTPATIENT
Start: 2024-09-10 | End: 2024-09-10 | Stop reason: HOSPADM

## 2024-09-11 ENCOUNTER — INFUSION (OUTPATIENT)
Dept: HEMATOLOGY/ONCOLOGY | Facility: CLINIC | Age: 69
End: 2024-09-11
Payer: COMMERCIAL

## 2024-09-11 ENCOUNTER — OFFICE VISIT (OUTPATIENT)
Dept: HEMATOLOGY/ONCOLOGY | Facility: CLINIC | Age: 69
End: 2024-09-11
Payer: COMMERCIAL

## 2024-09-11 VITALS
TEMPERATURE: 97.5 F | SYSTOLIC BLOOD PRESSURE: 127 MMHG | HEART RATE: 73 BPM | RESPIRATION RATE: 18 BRPM | OXYGEN SATURATION: 97 % | DIASTOLIC BLOOD PRESSURE: 82 MMHG

## 2024-09-11 VITALS
WEIGHT: 153.77 LBS | HEART RATE: 57 BPM | TEMPERATURE: 97.4 F | BODY MASS INDEX: 24.74 KG/M2 | DIASTOLIC BLOOD PRESSURE: 79 MMHG | OXYGEN SATURATION: 97 % | RESPIRATION RATE: 16 BRPM | SYSTOLIC BLOOD PRESSURE: 138 MMHG

## 2024-09-11 DIAGNOSIS — C18.9 MALIGNANT NEOPLASM OF COLON, UNSPECIFIED PART OF COLON (MULTI): Primary | ICD-10-CM

## 2024-09-11 DIAGNOSIS — C18.9 METASTATIC COLON CANCER TO LIVER (MULTI): ICD-10-CM

## 2024-09-11 DIAGNOSIS — C18.9 MALIGNANT NEOPLASM OF COLON, UNSPECIFIED PART OF COLON (MULTI): ICD-10-CM

## 2024-09-11 DIAGNOSIS — C78.7 METASTATIC COLON CANCER TO LIVER (MULTI): ICD-10-CM

## 2024-09-11 DIAGNOSIS — D50.9 IRON DEFICIENCY ANEMIA, UNSPECIFIED IRON DEFICIENCY ANEMIA TYPE: ICD-10-CM

## 2024-09-11 PROCEDURE — 99215 OFFICE O/P EST HI 40 MIN: CPT | Performed by: STUDENT IN AN ORGANIZED HEALTH CARE EDUCATION/TRAINING PROGRAM

## 2024-09-11 PROCEDURE — 1125F AMNT PAIN NOTED PAIN PRSNT: CPT | Performed by: STUDENT IN AN ORGANIZED HEALTH CARE EDUCATION/TRAINING PROGRAM

## 2024-09-11 PROCEDURE — 96416 CHEMO PROLONG INFUSE W/PUMP: CPT

## 2024-09-11 PROCEDURE — 96415 CHEMO IV INFUSION ADDL HR: CPT

## 2024-09-11 PROCEDURE — 1159F MED LIST DOCD IN RCRD: CPT | Performed by: STUDENT IN AN ORGANIZED HEALTH CARE EDUCATION/TRAINING PROGRAM

## 2024-09-11 PROCEDURE — 96411 CHEMO IV PUSH ADDL DRUG: CPT

## 2024-09-11 PROCEDURE — 96413 CHEMO IV INFUSION 1 HR: CPT

## 2024-09-11 PROCEDURE — 96367 TX/PROPH/DG ADDL SEQ IV INF: CPT

## 2024-09-11 PROCEDURE — 2500000004 HC RX 250 GENERAL PHARMACY W/ HCPCS (ALT 636 FOR OP/ED): Mod: SE | Performed by: STUDENT IN AN ORGANIZED HEALTH CARE EDUCATION/TRAINING PROGRAM

## 2024-09-11 PROCEDURE — 96375 TX/PRO/DX INJ NEW DRUG ADDON: CPT | Mod: INF

## 2024-09-11 RX ORDER — PALONOSETRON 0.05 MG/ML
0.25 INJECTION, SOLUTION INTRAVENOUS ONCE
Status: CANCELLED | OUTPATIENT
Start: 2024-09-11

## 2024-09-11 RX ORDER — PROCHLORPERAZINE EDISYLATE 5 MG/ML
10 INJECTION INTRAMUSCULAR; INTRAVENOUS EVERY 6 HOURS PRN
Status: CANCELLED | OUTPATIENT
Start: 2024-09-11

## 2024-09-11 RX ORDER — EPINEPHRINE 0.3 MG/.3ML
0.3 INJECTION SUBCUTANEOUS EVERY 5 MIN PRN
OUTPATIENT
Start: 2024-09-18

## 2024-09-11 RX ORDER — DIPHENHYDRAMINE HYDROCHLORIDE 50 MG/ML
50 INJECTION INTRAMUSCULAR; INTRAVENOUS AS NEEDED
Status: DISCONTINUED | OUTPATIENT
Start: 2024-09-11 | End: 2024-09-11 | Stop reason: HOSPADM

## 2024-09-11 RX ORDER — MAGNESIUM SULFATE HEPTAHYDRATE 40 MG/ML
2 INJECTION, SOLUTION INTRAVENOUS ONCE AS NEEDED
Status: CANCELLED | OUTPATIENT
Start: 2024-09-11

## 2024-09-11 RX ORDER — FAMOTIDINE 10 MG/ML
20 INJECTION INTRAVENOUS ONCE AS NEEDED
OUTPATIENT
Start: 2024-09-18

## 2024-09-11 RX ORDER — PALONOSETRON 0.05 MG/ML
0.25 INJECTION, SOLUTION INTRAVENOUS ONCE
Status: COMPLETED | OUTPATIENT
Start: 2024-09-11 | End: 2024-09-11

## 2024-09-11 RX ORDER — FAMOTIDINE 10 MG/ML
20 INJECTION INTRAVENOUS ONCE AS NEEDED
Status: CANCELLED | OUTPATIENT
Start: 2024-09-11

## 2024-09-11 RX ORDER — ALBUTEROL SULFATE 0.83 MG/ML
3 SOLUTION RESPIRATORY (INHALATION) AS NEEDED
Status: CANCELLED | OUTPATIENT
Start: 2024-09-11

## 2024-09-11 RX ORDER — ALBUTEROL SULFATE 0.83 MG/ML
3 SOLUTION RESPIRATORY (INHALATION) AS NEEDED
OUTPATIENT
Start: 2024-09-18

## 2024-09-11 RX ORDER — FAMOTIDINE 10 MG/ML
20 INJECTION INTRAVENOUS ONCE AS NEEDED
Status: DISCONTINUED | OUTPATIENT
Start: 2024-09-11 | End: 2024-09-11 | Stop reason: HOSPADM

## 2024-09-11 RX ORDER — MAGNESIUM SULFATE HEPTAHYDRATE 40 MG/ML
4 INJECTION, SOLUTION INTRAVENOUS ONCE AS NEEDED
Status: DISCONTINUED | OUTPATIENT
Start: 2024-09-11 | End: 2024-09-11 | Stop reason: HOSPADM

## 2024-09-11 RX ORDER — LORAZEPAM 2 MG/ML
1 INJECTION INTRAMUSCULAR AS NEEDED
Status: CANCELLED | OUTPATIENT
Start: 2024-09-11

## 2024-09-11 RX ORDER — PROCHLORPERAZINE MALEATE 10 MG
10 TABLET ORAL EVERY 6 HOURS PRN
Status: DISCONTINUED | OUTPATIENT
Start: 2024-09-11 | End: 2024-09-11 | Stop reason: HOSPADM

## 2024-09-11 RX ORDER — ALBUTEROL SULFATE 0.83 MG/ML
3 SOLUTION RESPIRATORY (INHALATION) AS NEEDED
Status: DISCONTINUED | OUTPATIENT
Start: 2024-09-11 | End: 2024-09-11 | Stop reason: HOSPADM

## 2024-09-11 RX ORDER — MAGNESIUM SULFATE HEPTAHYDRATE 40 MG/ML
2 INJECTION, SOLUTION INTRAVENOUS ONCE AS NEEDED
Status: DISCONTINUED | OUTPATIENT
Start: 2024-09-11 | End: 2024-09-11 | Stop reason: HOSPADM

## 2024-09-11 RX ORDER — DIPHENHYDRAMINE HYDROCHLORIDE 50 MG/ML
50 INJECTION INTRAMUSCULAR; INTRAVENOUS AS NEEDED
OUTPATIENT
Start: 2024-09-18

## 2024-09-11 RX ORDER — MAGNESIUM SULFATE HEPTAHYDRATE 40 MG/ML
4 INJECTION, SOLUTION INTRAVENOUS ONCE AS NEEDED
Status: CANCELLED | OUTPATIENT
Start: 2024-09-11

## 2024-09-11 RX ORDER — EPINEPHRINE 0.3 MG/.3ML
0.3 INJECTION SUBCUTANEOUS EVERY 5 MIN PRN
Status: DISCONTINUED | OUTPATIENT
Start: 2024-09-11 | End: 2024-09-11 | Stop reason: HOSPADM

## 2024-09-11 RX ORDER — EPINEPHRINE 0.3 MG/.3ML
0.3 INJECTION SUBCUTANEOUS EVERY 5 MIN PRN
Status: CANCELLED | OUTPATIENT
Start: 2024-09-11

## 2024-09-11 RX ORDER — PROCHLORPERAZINE MALEATE 10 MG
10 TABLET ORAL EVERY 6 HOURS PRN
Status: CANCELLED | OUTPATIENT
Start: 2024-09-11

## 2024-09-11 RX ORDER — PROCHLORPERAZINE EDISYLATE 5 MG/ML
10 INJECTION INTRAMUSCULAR; INTRAVENOUS EVERY 6 HOURS PRN
Status: DISCONTINUED | OUTPATIENT
Start: 2024-09-11 | End: 2024-09-11 | Stop reason: HOSPADM

## 2024-09-11 RX ORDER — DIPHENHYDRAMINE HYDROCHLORIDE 50 MG/ML
50 INJECTION INTRAMUSCULAR; INTRAVENOUS AS NEEDED
Status: CANCELLED | OUTPATIENT
Start: 2024-09-11

## 2024-09-11 RX ORDER — LORAZEPAM 2 MG/ML
1 INJECTION INTRAMUSCULAR AS NEEDED
Status: DISCONTINUED | OUTPATIENT
Start: 2024-09-11 | End: 2024-09-11 | Stop reason: HOSPADM

## 2024-09-11 ASSESSMENT — PAIN SCALES - GENERAL: PAINLEVEL: 5

## 2024-09-11 NOTE — PROGRESS NOTES
"Patient ambulated into clinic for follow up with Dr. Hu alone. Medications and allergies reviewed.       Daughter who is getting  got diagnosed with breast cancer recently.   Sister Alondra went in for surgery on her carotid arteries in [place] and she is now septic with a breathing tube in [place].   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     Last Friday after chemotherapy, she was just shaking at the edge of her bed, she could not sit still and she can not sit in the infusion chair for 7 hours it is just to long for her. \" I do not want to do the extra medication we added last time\".     Cold Sensitivity: 2-3 days after treatment  Neuropathy: denies   Nausea: denies  Vomiting: denies  Diarrhea: \"I have really loose stools\"   Appetite: \"good, I was surprised I lost a few pounds because I feel like I have been eating a lot\"   Change in taste\" present   Fatigue: \"terrible, no energy\"   Sleep: \"maybe 4 hours per night, up every couple hours\"   Rash: denies, stated she stopped taking minocycline because she is not going to do another round of the medication that causes the rash.     Supportive Oncology referral sent, patient has not scheduled. I told her I would reach out to get her scheduled and educated her on the importance of establishing care with them.     CT scan due before cycle #5.   Follow up in 2 weeks.                 "

## 2024-09-11 NOTE — PROGRESS NOTES
Patient came from seeing Dr. Hu, Per MD, no panitumumab today, to give iron, replace mag and then follow with chemo infusion. Reviewed plan with patient, ok to proceed. Dr. Hu said ok to run mag over one hour in mediport. Accessed mediport with good blood return, reviewed lab, weight, ready for infusion. Reviewed first dose teaching for magnesium and iron. Denies questions verbalized via teachback  Tolerated iron and magnesium infusion without complication. Proceeded to chemo infusion.   Tolerated chemo infusion, hooked up 5 FU pump, reviewed disconnect time. Denies questions or needs. Left infusion center stable and ambulatory

## 2024-09-11 NOTE — PROGRESS NOTES
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 Elana Dodd is a 70 yo F with PMH of HTN, RA (on leflunomide), HLD, GERD, s/p R nephrectomy (1998, donated kidney to sister), R breast cancer (8/20/20, ER+/IA+/HER2 1+ by IHC/AMARIS+) s/p R lumpectomy and R SLN (11/5/2020) followed by carboplatin/Taxotere/Herceptin x4C (stopped due to SE) and R RT (completed 7/2021) on letrozole (since 7/2021), and stage IIA colon cancer s/p laparoscopic sigmoid colectomy and colostomy and en bloc resection with appendectomy and L ureterolysis (7/6/2020) with subsequent pelvic RT (completed 10/15/2020) lost to follow-up found to have recurrent metastatic colon cancer to the liver.     She was previously diagnosed and treated for her breast and colon cancers in [place].      6/23/2020 - PET/CT - hypermetabolic mass in sigmoid colon & hypermet mass in R breast      7/6/2020 - sigmoid colon resection - pathology: Moderately differentiated invasive adenocarcinoma, 5.5 cm in diameter. Invades through muscularis propria focally into subserosal tissue. Marked adhesion of small bowel segments to colon without involvement of malignancy, +0/28 LN, negative margins, small focus of carcinoma in the serosa of margin furthest from this tumor (pT3 N0), at least stage IIA     8/12/2020 - BL mammogram & US: 3.6 cm mass in R breast     8/20/2020 - R breast biopsy - path: Invasive poorly differentiated ductal carcinoma. ER positive and IA positive and HER-2 by IHC is 1+, but by HER-2 AMARIS is positive. On MammaPrint testing malignancy is ER and IA positive and HER-2  positive, high risk, with a predicted 5-year metastasis free survival of 93% with chemotherapy and hormonal therapy versus 71% metastasis free survival with hormonal therapy alone.      10/15/2020 - Completed pelvic radiation      11/5/2020 - R lumpectomy and R SLNBx - path: 2.6 cm invasive ductal carcinoma, with all margins free of malignancy. The malignancy comes to within 1 mm of the closest deep margin. Perineural invasion is present. There is a focus of metastatic carcinoma, measuring 0.5 mm in 1 of 2 sentinel lymph nodes. pT2 pN1mic. Stage IIB     1/8/2021 - CT CAP - RLQ colostomy in tact, tiny nodules LLL nonspecific     5/11/2021 - CT CAP - stable small pulm nodules, worsening ventral hernia, interval takedown of LLQ ostomy, no evidence of met disease      Transitioned her care to her PCP where she was being prescribed letrozole. Has not had repeat mammogram or colonoscopy.     4/10/24 - Re-established care with medical oncology with Dr. Riccardo Blake at [facility]. Had not been seen by medical oncology since August 2021. Letrozole had been refilled by PCP. CT CAP, colonoscopy, CEA, CA 19-9, CA 27.29 were all ordered.      6/18/24 - CT A/P w contrast - large hepatic mass (7.5x8.7cm) involving both anterior segment of R lobe as well as medial segment of L lobe likely related to metastatic disease possibly colon cancer however met breast CA not excluded w hx; enlarged mohit hepatic LN likely metastatic, probably stable sidebranch IPMN and in pancreas   CT chest non con - stable tiny nodule superolateral LLL (3mm)     6/24/24 - Dr. Blake discussed imaging. Biopsy ordered.      7/1/24 - CT guided biopsy of liver lesion, path showed adenocarcinoma, metastatic from patient's known colonic primary, IHC +CK20, CDX2, SATB2 while negative for GATA3 and CK7, pMMR     Current Treatment:    7/24 - supposed to start FOLFOX. Had issues with skin open over port line.    7/31/24 - present: FOLFOX + panitumumab (panitumumab added  "starting C3)     Interval History:    Ms. Dodd is here today by herself for C4. She is tolerating treatment well with no nausea, vomiting, diarrhea. Pain is improved. However, she continues to struggle with severe anxiety and insomnia. Trazodone isn't helping. She has not scheduled an appt with supportive oncology yet. No appt with onco-psych still. She is taking lorazepam which is helping with anxiety intermittently. Previously declined escitalopram. Her daughter who is getting  in October was just diagnosed with breast cancer since I last saw patient. Her sister underwent a carotid procedure and is now admitted in the ICU in [place] and may pass away soon. These both occurred since I last saw her and have significantly worsened her sleep and anxiety. Doesn't want panitumumab today as it makes infusion time too long. \"Can't sit in infusion that long\"      ROS:  10-pt ROS reviewed and negative except as mentioned above.     PMHx / FHx / PSHx: reviewed and are accurate    Medications: below was reviewed and is accurate    Current Outpatient Medications:     allopurinol (Zyloprim) 100 mg tablet, Take 3 tablets (300 mg) by mouth 2 times a day., Disp: , Rfl:     amLODIPine (Norvasc) 5 mg tablet, Take 1 tablet (5 mg) by mouth once daily., Disp: , Rfl:     atorvastatin (Lipitor) 20 mg tablet, Take 1 tablet (20 mg) by mouth once daily., Disp: , Rfl:     cholecalciferol (Vitamin D-3) 25 MCG (1000 UT) capsule, Take 1 capsule (25 mcg) by mouth once daily., Disp: , Rfl:     clindamycin (Cleocin T) 1 % lotion, Apply topically to affected area twice daily., Disp: 60 mL, Rfl: 3    leflunomide (Arava) 10 mg tablet, Take by mouth once daily., Disp: , Rfl:     letrozole (Femara) 2.5 mg tablet, Take 1 tablet (2.5 mg total) by mouth once daily.  Take with or without food., Disp: , Rfl:     LORazepam (Ativan) 0.5 mg tablet, Take 0.5 tablets (0.25 mg) by mouth every 8 hours if needed for anxiety., Disp: 30 tablet, Rfl: 0    " omeprazole (PriLOSEC) 40 mg DR capsule, Take 1 capsule (40 mg) by mouth. Do not crush or chew., Disp: , Rfl:     ondansetron (Zofran) 8 mg tablet, Take 1 tablet (8 mg) by mouth every 8 hours if needed for nausea or vomiting., Disp: 30 tablet, Rfl: 5    oxyCODONE (Roxicodone) 15 mg immediate release tablet, Take 1 tablet (15 mg) by mouth every 6 hours if needed (pain). G89.3, Disp: 30 tablet, Rfl: 0    prochlorperazine (Compazine) 10 mg tablet, Take 1 tablet (10 mg) by mouth every 6 hours if needed for nausea or vomiting., Disp: 30 tablet, Rfl: 5    escitalopram (Lexapro) 10 mg tablet, Take 1 tablet (10 mg) by mouth once daily. (Patient not taking: Reported on 8/14/2024), Disp: 30 tablet, Rfl: 2    hydrocortisone 1 % cream, Apply topically to face, hands, feet, neck, back, and chest once daily at bedtime for rash prevention. (Patient not taking: Reported on 9/11/2024), Disp: 453.6 g, Rfl: 3    hydrOXYzine HCL (Atarax) 10 mg tablet, Take 1 tablet (10 mg) by mouth every 6 hours if needed for anxiety. (Patient not taking: Reported on 8/14/2024), Disp: 30 tablet, Rfl: 0    ibuprofen 800 mg tablet, Take 1 tablet (800 mg) by mouth every 8 hours if needed., Disp: , Rfl:     loperamide (Imodium) 2 mg capsule, Take 2 capsules (4 mg) by mouth with the first episode of diarrhea and 1 capsule (2 mg) by mouth with any additional episodes. Maximum 8 capsules (16 mg) per day. (Patient not taking: Reported on 8/28/2024), Disp: 30 capsule, Rfl: 2    minocycline 100 mg capsule, Take 1 capsule (100 mg) by mouth once every 24 hours. For rash prevention (Patient not taking: Reported on 9/11/2024), Disp: 30 capsule, Rfl: 2    OLANZapine (ZyPREXA) 5 mg tablet, Take 1 tablet (5 mg) by mouth once daily at bedtime. 1 tab at bedtime for 3 days, starting the night of chemo (Patient not taking: Reported on 8/28/2024), Disp: 30 tablet, Rfl: 3    traZODone (Desyrel) 50 mg tablet, Take 1 tablet (50 mg) by mouth once daily at bedtime. (Patient not  taking: Reported on 2024), Disp: 30 tablet, Rfl: 0    Physical exam:  Vitals:    24 0806   BP: 138/79   BP Location: Right arm   Patient Position: Sitting   BP Cuff Size: Adult long   Pulse: 57   Resp: 16   Temp: 36.3 °C (97.4 °F)   TempSrc: Temporal   SpO2: 97%   Weight: 69.7 kg (153 lb 12.3 oz)   ECO  GEN: NAD, appears tired, tearful, anxious   LUNGS: normal respiratory effort  EXT: No deformities or edema  NEURO: Grossly intact. No focal deficits.  HEME: No signs of easy bruising or active bleeding.  PSYCH: Appropriate mood and affect    Laboratory review:    24 labs reviewed     ASSESSMENT AND PLAN:     Ms. Elana Dodd is a 70 yo F with PMH of HTN, RA (on leflunomide), HLD, GERD, s/p R nephrectomy (, donated kidney to sister), R breast cancer (20, ER+/RI+/HER2 1+ by IHC/AMARIS+) s/p R lumpectomy and R SLN (2020) followed by carboplatin/Taxotere/Herceptin x4C (stopped due to SE) and R RT (completed 2021) on letrozole (since 2021), and stage IIA colon cancer s/p laparoscopic sigmoid colectomy and colostomy and en bloc resection with appendectomy and L ureterolysis (2020) with subsequent pelvic RT (completed 10/15/2020) lost to follow-up found to have recurrent metastatic colon cancer to the liver here today for follow-up.     Ms. Dodd is has worsening anxiety and stress since last visit due to her daughter being diagnosed with breast cancer and her sister on life support in ICU out in [place]. She hasn't seen onco-psychiatry/psychology or supportive oncology yet.     Metastatic colon cancer to the liver: Panitumumab was added on last cycle which she tolerated without issue. Declines it today as she can't sit in infusion that long. Labs and toxicities with acceptable limits. Proceed with cycle 4 FOLFOX. She will be gone for daughter's wedding in [place] - unless she tells me otherwise about plans changing with daughter's recent diagnosis. CT CAP before cycle  5.  Anxiety: Severe. Hydroxyzine 20mg PRN and lorazepam 0.5mg PRN. Refill sent for lorazepam. Declined escitalopram. Referral to onco-psych. I also recommended she reach out to the [facility].    Drug-induced rash: Stopping panitumumab so she has stopped these medications  Pain (R rib and shoulder): Improved. Controlled with PRN oxycodone.    Constipation: Recommended taking senna and Miralax if taking oxycodone daily.   Nausea: Olanzapine nightly. Zofran and Compazine as needed.   Insomnia: Reiterated sleep hygiene - no screen time before bed. Olanzapine nightly. I do think this is from her uncontrolled anxiety. Referral with supp onc, appt scheduled.  Anemia: IV iron today and in 2 weeks.    RTC in 2 weeks for C5 FOLFOX.    Felipa Hu MD  Staff, Gastrointestinal Medical Oncology  [facility]

## 2024-09-12 ENCOUNTER — INFUSION (OUTPATIENT)
Dept: HEMATOLOGY/ONCOLOGY | Facility: CLINIC | Age: 69
End: 2024-09-12
Payer: COMMERCIAL

## 2024-09-12 ENCOUNTER — TELEPHONE (OUTPATIENT)
Dept: HEMATOLOGY/ONCOLOGY | Facility: CLINIC | Age: 69
End: 2024-09-12
Payer: COMMERCIAL

## 2024-09-12 DIAGNOSIS — C78.7 METASTATIC COLON CANCER TO LIVER (MULTI): ICD-10-CM

## 2024-09-12 DIAGNOSIS — C18.9 METASTATIC COLON CANCER TO LIVER (MULTI): ICD-10-CM

## 2024-09-12 PROCEDURE — 96523 IRRIG DRUG DELIVERY DEVICE: CPT

## 2024-09-12 RX ORDER — HEPARIN 100 UNIT/ML
500 SYRINGE INTRAVENOUS AS NEEDED
Status: DISCONTINUED | OUTPATIENT
Start: 2024-09-12 | End: 2024-09-12 | Stop reason: HOSPADM

## 2024-09-12 RX ORDER — HEPARIN 100 UNIT/ML
500 SYRINGE INTRAVENOUS AS NEEDED
Status: CANCELLED | OUTPATIENT
Start: 2024-09-12

## 2024-09-12 RX ORDER — HEPARIN SODIUM,PORCINE/PF 10 UNIT/ML
50 SYRINGE (ML) INTRAVENOUS AS NEEDED
Status: CANCELLED | OUTPATIENT
Start: 2024-09-12

## 2024-09-12 RX ORDER — HEPARIN SODIUM,PORCINE/PF 10 UNIT/ML
50 SYRINGE (ML) INTRAVENOUS AS NEEDED
Status: DISCONTINUED | OUTPATIENT
Start: 2024-09-12 | End: 2024-09-12 | Stop reason: HOSPADM

## 2024-09-12 NOTE — TELEPHONE ENCOUNTER
Called and spoke with patient. Patient stated that her port is leaking. She is currently accessed for her pump infusion. Walked patient through on how to shut off pump. Instructed patient to come in to get evaluated in infusion. No barriers to learning. Patient verbalized understanding using the teach back method.

## 2024-09-12 NOTE — PROGRESS NOTES
Pt to infusion for port check.  Home pump currently on hold, tegaderm dressing with moisture present.  Dressing removed and new dressing applied, port flushes easily but unable to get blood return.  Pt agrees to port de-access and re-access.  Port de-accessed, cleaned, and re-accessed with new manning.  Initially unable to obtain a blood return (pt states that this is a regular occurrence), but brisk blood return noted with repositioning of patient.  Port flushed well and home infusion pump connected and restarted.  Infusing without difficulty and patient without any complaints.  Is aware pump disconnect will now be at 1230.  Advised to call or return with any problems or concerns.  Pt verbalizes understanding.  Leaves with pump infusing and dressing dry and intact.

## 2024-09-13 ENCOUNTER — INFUSION (OUTPATIENT)
Dept: HEMATOLOGY/ONCOLOGY | Facility: CLINIC | Age: 69
End: 2024-09-13
Payer: COMMERCIAL

## 2024-09-13 VITALS
WEIGHT: 154.76 LBS | HEART RATE: 79 BPM | TEMPERATURE: 96.8 F | OXYGEN SATURATION: 98 % | RESPIRATION RATE: 17 BRPM | DIASTOLIC BLOOD PRESSURE: 85 MMHG | BODY MASS INDEX: 24.9 KG/M2 | SYSTOLIC BLOOD PRESSURE: 132 MMHG

## 2024-09-13 DIAGNOSIS — C18.9 MALIGNANT NEOPLASM OF COLON, UNSPECIFIED PART OF COLON (MULTI): ICD-10-CM

## 2024-09-13 DIAGNOSIS — C18.9 METASTATIC COLON CANCER TO LIVER (MULTI): ICD-10-CM

## 2024-09-13 DIAGNOSIS — C78.7 METASTATIC COLON CANCER TO LIVER (MULTI): ICD-10-CM

## 2024-09-13 PROCEDURE — 96523 IRRIG DRUG DELIVERY DEVICE: CPT

## 2024-09-13 PROCEDURE — 2500000004 HC RX 250 GENERAL PHARMACY W/ HCPCS (ALT 636 FOR OP/ED): Mod: SE | Performed by: STUDENT IN AN ORGANIZED HEALTH CARE EDUCATION/TRAINING PROGRAM

## 2024-09-13 RX ORDER — HEPARIN 100 UNIT/ML
500 SYRINGE INTRAVENOUS AS NEEDED
OUTPATIENT
Start: 2024-09-13

## 2024-09-13 RX ORDER — HEPARIN 100 UNIT/ML
500 SYRINGE INTRAVENOUS AS NEEDED
Status: DISCONTINUED | OUTPATIENT
Start: 2024-09-13 | End: 2024-09-13 | Stop reason: HOSPADM

## 2024-09-13 RX ORDER — HEPARIN SODIUM,PORCINE/PF 10 UNIT/ML
50 SYRINGE (ML) INTRAVENOUS AS NEEDED
Status: DISCONTINUED | OUTPATIENT
Start: 2024-09-13 | End: 2024-09-13 | Stop reason: HOSPADM

## 2024-09-13 RX ORDER — HEPARIN SODIUM,PORCINE/PF 10 UNIT/ML
50 SYRINGE (ML) INTRAVENOUS AS NEEDED
OUTPATIENT
Start: 2024-09-13

## 2024-09-13 ASSESSMENT — PAIN SCALES - GENERAL: PAINLEVEL: 0-NO PAIN

## 2024-09-13 NOTE — PROGRESS NOTES
Patient here for pump disconnect. Denies changes in how she is feeling. Mediport dressing dry and intact prior to removal. Able to get blood return from mediport after patient laid flat. Reviewed schedule, denies questions.

## 2024-09-16 ENCOUNTER — APPOINTMENT (OUTPATIENT)
Dept: BEHAVIORAL HEALTH | Facility: CLINIC | Age: 69
End: 2024-09-16
Payer: COMMERCIAL

## 2024-09-19 ENCOUNTER — TELEPHONE (OUTPATIENT)
Dept: HEMATOLOGY/ONCOLOGY | Facility: CLINIC | Age: 69
End: 2024-09-19
Payer: COMMERCIAL

## 2024-09-19 DIAGNOSIS — L27.0 RASH, DRUG: Primary | ICD-10-CM

## 2024-09-19 DIAGNOSIS — C80.1 ANXIETY ASSOCIATED WITH CANCER DIAGNOSIS (MULTI): ICD-10-CM

## 2024-09-19 DIAGNOSIS — F41.1 ANXIETY ASSOCIATED WITH CANCER DIAGNOSIS (MULTI): ICD-10-CM

## 2024-09-19 RX ORDER — HYDROCORTISONE 25 MG/G
CREAM TOPICAL 2 TIMES DAILY
Qty: 20 G | Refills: 0 | Status: SHIPPED | OUTPATIENT
Start: 2024-09-19

## 2024-09-19 NOTE — TELEPHONE ENCOUNTER
Per Dr. Hu, she will send a steroid cream to the pharmacy to be used twice a day.  I instructed Elana to call back if fever develops pr rash get worse.  She did a teachback.

## 2024-09-19 NOTE — TELEPHONE ENCOUNTER
"Spoke to Elana.  She describes a \"rash\" on her mediport that is dark red, very pruritic, pimple like and extends up her neck.  She denies fever, chills, pain. This has been present since the port needle was disconnected last Friday per Elana.  It has progressively gotten worse so she called today.  Dr. Hu notified for direction.  "

## 2024-09-23 ENCOUNTER — TELEPHONE (OUTPATIENT)
Dept: HEMATOLOGY/ONCOLOGY | Facility: CLINIC | Age: 69
End: 2024-09-23

## 2024-09-23 ENCOUNTER — TELEPHONE (OUTPATIENT)
Dept: HEMATOLOGY/ONCOLOGY | Facility: CLINIC | Age: 69
End: 2024-09-23
Payer: COMMERCIAL

## 2024-09-23 ENCOUNTER — HOSPITAL ENCOUNTER (EMERGENCY)
Facility: HOSPITAL | Age: 69
Discharge: HOME | End: 2024-09-23
Attending: STUDENT IN AN ORGANIZED HEALTH CARE EDUCATION/TRAINING PROGRAM
Payer: COMMERCIAL

## 2024-09-23 ENCOUNTER — APPOINTMENT (OUTPATIENT)
Dept: RADIOLOGY | Facility: HOSPITAL | Age: 69
End: 2024-09-23
Payer: COMMERCIAL

## 2024-09-23 VITALS
SYSTOLIC BLOOD PRESSURE: 122 MMHG | BODY MASS INDEX: 23.86 KG/M2 | HEART RATE: 78 BPM | DIASTOLIC BLOOD PRESSURE: 74 MMHG | OXYGEN SATURATION: 99 % | RESPIRATION RATE: 16 BRPM | TEMPERATURE: 98.4 F | WEIGHT: 152 LBS | HEIGHT: 67 IN

## 2024-09-23 DIAGNOSIS — T82.9XXA VASCULAR PORT COMPLICATION, INITIAL ENCOUNTER: Primary | ICD-10-CM

## 2024-09-23 LAB
ALBUMIN SERPL BCP-MCNC: 3.8 G/DL (ref 3.4–5)
ALP SERPL-CCNC: 170 U/L (ref 33–136)
ALT SERPL W P-5'-P-CCNC: 16 U/L (ref 7–45)
ANION GAP SERPL CALCULATED.3IONS-SCNC: 13 MMOL/L (ref 10–20)
AST SERPL W P-5'-P-CCNC: 47 U/L (ref 9–39)
BASOPHILS # BLD AUTO: 0.02 X10*3/UL (ref 0–0.1)
BASOPHILS NFR BLD AUTO: 0.3 %
BILIRUB SERPL-MCNC: 0.4 MG/DL (ref 0–1.2)
BUN SERPL-MCNC: 8 MG/DL (ref 6–23)
BURR CELLS BLD QL SMEAR: NORMAL
CALCIUM SERPL-MCNC: 8.8 MG/DL (ref 8.6–10.3)
CHLORIDE SERPL-SCNC: 101 MMOL/L (ref 98–107)
CO2 SERPL-SCNC: 23 MMOL/L (ref 21–32)
CREAT SERPL-MCNC: 0.74 MG/DL (ref 0.5–1.05)
DACRYOCYTES BLD QL SMEAR: NORMAL
EGFRCR SERPLBLD CKD-EPI 2021: 89 ML/MIN/1.73M*2
EOSINOPHIL # BLD AUTO: 0.07 X10*3/UL (ref 0–0.7)
EOSINOPHIL NFR BLD AUTO: 1.1 %
ERYTHROCYTE [DISTWIDTH] IN BLOOD BY AUTOMATED COUNT: 20.1 % (ref 11.5–14.5)
GLUCOSE SERPL-MCNC: 113 MG/DL (ref 74–99)
HCT VFR BLD AUTO: 32.7 % (ref 36–46)
HGB BLD-MCNC: 10.4 G/DL (ref 12–16)
IMM GRANULOCYTES # BLD AUTO: 0.03 X10*3/UL (ref 0–0.7)
IMM GRANULOCYTES NFR BLD AUTO: 0.5 % (ref 0–0.9)
LYMPHOCYTES # BLD AUTO: 0.84 X10*3/UL (ref 1.2–4.8)
LYMPHOCYTES NFR BLD AUTO: 13.4 %
MCH RBC QN AUTO: 28.9 PG (ref 26–34)
MCHC RBC AUTO-ENTMCNC: 31.8 G/DL (ref 32–36)
MCV RBC AUTO: 91 FL (ref 80–100)
MONOCYTES # BLD AUTO: 0.88 X10*3/UL (ref 0.1–1)
MONOCYTES NFR BLD AUTO: 14 %
NEUTROPHILS # BLD AUTO: 4.45 X10*3/UL (ref 1.2–7.7)
NEUTROPHILS NFR BLD AUTO: 70.7 %
NRBC BLD-RTO: 0 /100 WBCS (ref 0–0)
OVALOCYTES BLD QL SMEAR: NORMAL
PLATELET # BLD AUTO: 149 X10*3/UL (ref 150–450)
POLYCHROMASIA BLD QL SMEAR: NORMAL
POTASSIUM SERPL-SCNC: 3.4 MMOL/L (ref 3.5–5.3)
PROT SERPL-MCNC: 7.3 G/DL (ref 6.4–8.2)
RBC # BLD AUTO: 3.6 X10*6/UL (ref 4–5.2)
RBC MORPH BLD: NORMAL
SCHISTOCYTES BLD QL SMEAR: NORMAL
SODIUM SERPL-SCNC: 134 MMOL/L (ref 136–145)
WBC # BLD AUTO: 6.3 X10*3/UL (ref 4.4–11.3)

## 2024-09-23 PROCEDURE — 71045 X-RAY EXAM CHEST 1 VIEW: CPT

## 2024-09-23 PROCEDURE — 71045 X-RAY EXAM CHEST 1 VIEW: CPT | Performed by: RADIOLOGY

## 2024-09-23 PROCEDURE — 99283 EMERGENCY DEPT VISIT LOW MDM: CPT

## 2024-09-23 PROCEDURE — 85025 COMPLETE CBC W/AUTO DIFF WBC: CPT

## 2024-09-23 PROCEDURE — 2500000004 HC RX 250 GENERAL PHARMACY W/ HCPCS (ALT 636 FOR OP/ED): Mod: JZ | Performed by: STUDENT IN AN ORGANIZED HEALTH CARE EDUCATION/TRAINING PROGRAM

## 2024-09-23 PROCEDURE — 84075 ASSAY ALKALINE PHOSPHATASE: CPT

## 2024-09-23 RX ORDER — ACETAMINOPHEN 325 MG/1
975 TABLET ORAL ONCE
Status: COMPLETED | OUTPATIENT
Start: 2024-09-23 | End: 2024-09-23

## 2024-09-23 RX ORDER — LORAZEPAM 0.5 MG/1
0.25 TABLET ORAL EVERY 8 HOURS PRN
Qty: 30 TABLET | Refills: 0 | Status: SHIPPED | OUTPATIENT
Start: 2024-09-23

## 2024-09-23 ASSESSMENT — PAIN SCALES - GENERAL
PAINLEVEL_OUTOF10: 4
PAINLEVEL_OUTOF10: 0 - NO PAIN
PAINLEVEL_OUTOF10: 10 - WORST POSSIBLE PAIN

## 2024-09-23 ASSESSMENT — PAIN DESCRIPTION - DESCRIPTORS: DESCRIPTORS: BURNING

## 2024-09-23 ASSESSMENT — PAIN DESCRIPTION - ONSET: ONSET: ONGOING

## 2024-09-23 ASSESSMENT — COLUMBIA-SUICIDE SEVERITY RATING SCALE - C-SSRS
2. HAVE YOU ACTUALLY HAD ANY THOUGHTS OF KILLING YOURSELF?: NO
6. HAVE YOU EVER DONE ANYTHING, STARTED TO DO ANYTHING, OR PREPARED TO DO ANYTHING TO END YOUR LIFE?: NO
1. IN THE PAST MONTH, HAVE YOU WISHED YOU WERE DEAD OR WISHED YOU COULD GO TO SLEEP AND NOT WAKE UP?: NO

## 2024-09-23 ASSESSMENT — PAIN DESCRIPTION - LOCATION: LOCATION: NECK

## 2024-09-23 ASSESSMENT — PAIN DESCRIPTION - FREQUENCY: FREQUENCY: CONSTANT/CONTINUOUS

## 2024-09-23 ASSESSMENT — PAIN DESCRIPTION - PROGRESSION: CLINICAL_PROGRESSION: NOT CHANGED

## 2024-09-23 ASSESSMENT — PAIN - FUNCTIONAL ASSESSMENT
PAIN_FUNCTIONAL_ASSESSMENT: 0-10
PAIN_FUNCTIONAL_ASSESSMENT: 0-10

## 2024-09-23 ASSESSMENT — PAIN DESCRIPTION - PAIN TYPE: TYPE: ACUTE PAIN

## 2024-09-23 NOTE — ED PROVIDER NOTES
HPI   Chief Complaint   Patient presents with    Rash     Pt has a rash around her chemo port. Neck pain from a bulging vein. 10/10 pain when moving neck.       HPI  See MDM      Patient History   Past Medical History:   Diagnosis Date    Breast cancer (Multi)     2020 right breast    Colon cancer (Multi)     2020    GERD (gastroesophageal reflux disease)     HLD (hyperlipidemia)     HTN (hypertension)     Rheumatoid arthritis (Multi)      Past Surgical History:   Procedure Laterality Date    NEPHRECTOMY      1998, donated to sister    TOE SURGERY Left     3/2023    TOE SURGERY Right     4/2024     Family History   Problem Relation Name Age of Onset    Other (mouth cancer) Sister       Social History     Tobacco Use    Smoking status: Former     Types: Cigarettes     Passive exposure: Current    Smokeless tobacco: Current   Vaping Use    Vaping status: Every Day    Substances: Nicotine    Devices: Disposable, Refillable tank   Substance Use Topics    Alcohol use: Not Currently     Comment: seldom beer    Drug use: Never       Physical Exam   ED Triage Vitals [09/23/24 1115]   Temperature Heart Rate Respirations BP   36.9 °C (98.4 °F) 86 18 (!) 149/108      Pulse Ox Temp Source Heart Rate Source Patient Position   100 % Temporal -- Sitting      BP Location FiO2 (%)     Left arm --       Physical Exam  See [facility]    ED Course & [facility]   Diagnoses as of 09/23/24 1500   Vascular port complication, initial encounter                 No data recorded     [place] Coma Scale Score: 15 (09/23/24 1113 : Justin Mccormick, EMT)                           Medical Decision Making  69-year-old female with past medical history of metastatic colon cancer who presents emergency room with pain at her Chemo-Port.  Patient notes in the last several weeks she has noticed that to be more and more difficult to receive chemo as well as dried blood from her port.  Patient feels a cordlike and fullness sensation along her neck along the course of the  catheter.  Patient had a rash previously but was given a steroid cream by her oncologist and she notes improvement of the redness.  She denies recent fevers, chills, nausea, vomiting, focal weakness, vision changes, chest pain or shortness of breath.    ED Triage Vitals [09/23/24 1115]   Temperature Heart Rate Respirations BP   36.9 °C (98.4 °F) 86 18 (!) 149/108      Pulse Ox Temp Source Heart Rate Source Patient Position   100 % Temporal -- Sitting      BP Location FiO2 (%)     Left arm --       Vital signs reviewed: Afebrile, nontachycardic, normotensive, 100% on room air    Exam     Constitutional: No acute distress. Resting comfortably.   Head: Normocephalic, atraumatic.   Eyes: Pupils equal bilaterally, EOM grossly intact, conjunctiva normal.  Mouth/Throat: Oropharynx is clear, moist mucus membranes.   Neck: Supple. No lymphadenopathy.  Cardiovascular: Regular rate and regular rhythm. Extremities are well-perfused.   Pulmonary/Chest: No respiratory distress, breathing comfortably on room air.  Port palpated in right anterior chest, cordlike object palpated along the course of catheter, no significant erythema, pus drainage, or tenderness to palpation.  Abdominal: Soft, non-tender, non-distended. No rebound or guarding.   Musculoskeletal: No lower extremity edema.       Skin: Warm, dry, and intact.   Neurological: Patient is oriented to person, place, time, and situation. Face symmetric, hearing intact to voice, speech normal. Moves all extremities.       Differential includes but is not limited to:  Clot versus clogged catheter versus musculoskeletal pain      Labs: ordered.  Labs Reviewed   CBC WITH AUTO DIFFERENTIAL - Abnormal       Result Value    WBC 6.3      nRBC 0.0      RBC 3.60 (*)     Hemoglobin 10.4 (*)     Hematocrit 32.7 (*)     MCV 91      MCH 28.9      MCHC 31.8 (*)     RDW 20.1 (*)     Platelets 149 (*)     Neutrophils % 70.7      Immature Granulocytes %, Automated 0.5      Lymphocytes % 13.4       Monocytes % 14.0      Eosinophils % 1.1      Basophils % 0.3      Neutrophils Absolute 4.45      Immature Granulocytes Absolute, Automated 0.03      Lymphocytes Absolute 0.84 (*)     Monocytes Absolute 0.88      Eosinophils Absolute 0.07      Basophils Absolute 0.02     COMPREHENSIVE METABOLIC PANEL - Abnormal    Glucose 113 (*)     Sodium 134 (*)     Potassium 3.4 (*)     Chloride 101      Bicarbonate 23      Anion Gap 13      Urea Nitrogen 8      Creatinine 0.74      eGFR 89      Calcium 8.8      Albumin 3.8      Alkaline Phosphatase 170 (*)     Total Protein 7.3      AST 47 (*)     Bilirubin, Total 0.4      ALT 16     MORPHOLOGY    RBC Morphology See Below      Polychromasia Mild      RBC Fragments Few      Ovalocytes Few      Teardrop Cells Few      Gaby Cells Few         Radiology: Chest x-ray ordered.    ECG/medicine tests: ordered and independent interpretation performed.  Diagnoses as of 09/23/24 1500   Vascular port complication, initial encounter     Lab work as interpreted by me shows no significant leukocytosis or anemia on CBC and otherwise CMP shows mild hyponatremia, hypokalemia, but no TASHA or significant LFT abnormality.    Discussion of management or test interpretation with external provider(s):  I personally discussed the patient's management with Dr. Nielson, interventional radiologist, who recommends Cathflo x 2.    Cathflo administered successfully, patient notes improvement of symptoms.  Port flushes well but does not draw back at this point in time.    On reassessment after Cathflo port is flowing well.  Chest x-ray as interpreted by me shows no kinking of the cath and otherwise no large pneumothorax at this time.    Patient is to follow-up with her oncologist regarding possible outpatient port assessment.    PLAN AND FOLLOW-UP: Patient counseled on all findings, diagnosis and treatment plan. Patient's questions and concerns addressed. Patient stable, discharged with instructions to follow  up with PMD, and to return to ED at any time for worsening symptoms or any other concerns. Patient demonstrates understanding of the findings and the importance of appropriate follow up care.    ED Medications managed:    Medications   acetaminophen (Tylenol) tablet 975 mg (975 mg oral Given 9/23/24 1157)   alteplase (Cathflo Activase) injection 2 mg (2 mg intra-catheter Given 9/23/24 1419)       PATIENT REFERRED TO:  Rafat GOMEZ MD  [address] [place], [place] [id number]  [phone]            DISCHARGE MEDICATIONS:  New Prescriptions    No medications on file       Jonathan Munoz MD  3:00 PM    Attending Emergency Physician  [facility] EMERGENCY MEDICINE            Procedure  Procedures     Jonathan Munoz MD  09/23/24 [id number]

## 2024-09-23 NOTE — TELEPHONE ENCOUNTER
Patient requests refill on her Lorazepam.  She will come tomorrow for her CT Scan and the labs drawn are good for her Tx on Wednesday.

## 2024-09-23 NOTE — ED TRIAGE NOTES
TRIAGE NOTE   I saw the patient as the Clinician in Triage and performed a brief history and physical exam, established acuity, and ordered appropriate tests to develop basic plan of care. Patient will be seen by an CODY, resident and/or physician who will independently evaluate the patient. Please see subsequent provider notes for further details and disposition.     Brief HPI: In brief, Elana Dodd is a 69 y.o. female that presents for difficulties with right-sided Chemo-Port.  Patient has history of metastatic colon cancer and gets chemo every other Wednesday.  She states that her last chemotherapy was last Wednesday.  She states that she had her port placed 5 months ago and had some initial difficulties with it.  She states that after chemo last week she start developing a erythematous bumpy rash and received a steroid cream for it which did help with the itchiness, but still complaining of pain, discoloration, swelling, and bulging of the vein.  She states the pain goes from her port up into her neck.  She states that over the last several weeks they have been having increasingly difficult time accessing her port getting blood work, and giving her chemo.  She denies any fevers or chills, but states that she just feels generally rundown and is concerned about her port.    Focused Physical exam:   Gen: Well nourished, well developed in no distress. Good historian and answers questions appropriately.    Skin: Discoloration, excoriation, and minimal inflammation around right sided chest port with a bulging linear vein originating at port..    Resp: Breath sounds equal and clear bilaterally. No adventitious sounds. No increased effort of breathing.    CV: Heart S1, S2 regular rate and rhythm. No m/r/g. Distal pulses 3+ symmetric bilaterally. No lower extremity edema.    Plan/MDM:   Imaging ordered for further evaluation as well as labs.  Please see subsequent provider note for further details and disposition

## 2024-09-23 NOTE — TELEPHONE ENCOUNTER
Elana Olmos c/o burning pain over bulging neck vein above her port. She states the area is discolored and black and blue and very uncomfortable. She wants the site examined. Rash she had las week is gone over the port area. Denies fever or chills or and break in skin integrity over the area. Please advise me.   Dr. Hu notified.  Per Dr. Hu order patient instructed to go to the ER at [facility].  Elana Olmos is in agreement.

## 2024-09-23 NOTE — DISCHARGE INSTRUCTIONS
Please follow-up with your oncologist, notify them that the port has been giving you issues, the port is flowing well and drawing back well today but your oncologist can send you for an outpatient study to assess it.    If you develop any worsening redness, swelling, or drainage from the area overlying the skin please return to the emergency room.

## 2024-09-24 ENCOUNTER — HOSPITAL ENCOUNTER (OUTPATIENT)
Dept: RADIOLOGY | Facility: CLINIC | Age: 69
Discharge: HOME | End: 2024-09-24
Payer: COMMERCIAL

## 2024-09-24 ENCOUNTER — TELEPHONE (OUTPATIENT)
Dept: HEMATOLOGY/ONCOLOGY | Facility: CLINIC | Age: 69
End: 2024-09-24
Payer: COMMERCIAL

## 2024-09-24 DIAGNOSIS — C18.9 MALIGNANT NEOPLASM OF COLON, UNSPECIFIED PART OF COLON (MULTI): ICD-10-CM

## 2024-09-24 DIAGNOSIS — C78.7 METASTATIC COLON CANCER TO LIVER (MULTI): ICD-10-CM

## 2024-09-24 DIAGNOSIS — C18.9 METASTATIC COLON CANCER TO LIVER (MULTI): ICD-10-CM

## 2024-09-24 PROCEDURE — 71260 CT THORAX DX C+: CPT | Performed by: RADIOLOGY

## 2024-09-24 PROCEDURE — 74177 CT ABD & PELVIS W/CONTRAST: CPT | Performed by: RADIOLOGY

## 2024-09-24 PROCEDURE — 2550000001 HC RX 255 CONTRASTS: Performed by: STUDENT IN AN ORGANIZED HEALTH CARE EDUCATION/TRAINING PROGRAM

## 2024-09-24 PROCEDURE — 74177 CT ABD & PELVIS W/CONTRAST: CPT

## 2024-09-24 NOTE — TELEPHONE ENCOUNTER
Spoke to Elana Olmos.  Instructed Elana Olmos to come to appt tomorrow as scheduled and Dr. Hu and her team will look at the mediport then.  She is in agreement.

## 2024-09-25 ENCOUNTER — OFFICE VISIT (OUTPATIENT)
Dept: HEMATOLOGY/ONCOLOGY | Facility: CLINIC | Age: 69
End: 2024-09-25
Payer: COMMERCIAL

## 2024-09-25 ENCOUNTER — APPOINTMENT (OUTPATIENT)
Dept: HEMATOLOGY/ONCOLOGY | Facility: CLINIC | Age: 69
End: 2024-09-25
Payer: COMMERCIAL

## 2024-09-25 VITALS
OXYGEN SATURATION: 98 % | DIASTOLIC BLOOD PRESSURE: 82 MMHG | BODY MASS INDEX: 24.07 KG/M2 | SYSTOLIC BLOOD PRESSURE: 120 MMHG | WEIGHT: 153.66 LBS | HEART RATE: 95 BPM | TEMPERATURE: 97.5 F | RESPIRATION RATE: 18 BRPM

## 2024-09-25 DIAGNOSIS — C18.9 METASTATIC COLON CANCER TO LIVER (MULTI): ICD-10-CM

## 2024-09-25 DIAGNOSIS — C18.9 MALIGNANT NEOPLASM OF COLON, UNSPECIFIED PART OF COLON (MULTI): ICD-10-CM

## 2024-09-25 DIAGNOSIS — C78.7 METASTATIC COLON CANCER TO LIVER (MULTI): ICD-10-CM

## 2024-09-25 PROCEDURE — 99214 OFFICE O/P EST MOD 30 MIN: CPT | Performed by: STUDENT IN AN ORGANIZED HEALTH CARE EDUCATION/TRAINING PROGRAM

## 2024-09-25 PROCEDURE — 1125F AMNT PAIN NOTED PAIN PRSNT: CPT | Performed by: STUDENT IN AN ORGANIZED HEALTH CARE EDUCATION/TRAINING PROGRAM

## 2024-09-25 PROCEDURE — 1159F MED LIST DOCD IN RCRD: CPT | Performed by: STUDENT IN AN ORGANIZED HEALTH CARE EDUCATION/TRAINING PROGRAM

## 2024-09-25 ASSESSMENT — PAIN SCALES - GENERAL: PAINLEVEL: 8

## 2024-09-25 NOTE — PROGRESS NOTES
"Patient ambulated into clinic for follow up with Dr. Hu alone. Medications and allergies reviewed.     Fatigue- \"so exhausted\"  Diarrhea- not taking imodium due to she does not want to be constipated but is having diarrhea everytime she eats.   Nausea- present but is not taking PRN medications.   \"I'm just sick, I just don't feel good. I have a headache that comes and goes. I have this cough.\"   Productive cough bringing up clear mucous.   Complaints of right sided neck pain where mediport is. \"This thing is just killing me, non-stop stinging\". Dr. Hu aware.    Reviewed importance of using PRN medications for symptom management at home.     Her sister in [place] recently passed away due to liver failure, will be leaving October 8th and returning on 10/14.      Chemo on hold this week, to be scheduled for 10/2.     Follow up in one week.     "

## 2024-09-25 NOTE — PROGRESS NOTES
[facility]   GI Medical Oncology Clinic  Follow-Up Patient Visit    Patient Name: Elana Dodd  MRN: [id number]  Date of Service: 9/25/24  PCP: Rafat Mcclendon MD    Diagnosis: recurrent colon adenocarcinoma with metastatic disease to the liver   MMR: proficient  NGS: Caris: BRYANT, mutations in DACH1, SETD2, TP53, APC  Tempus xF: TP53, APC, TMB 8.6, BRYANT   CEA: 44.2 (6/3/24), 67.8 (7/31/24)  CA 19-9: 178 (6/3/24)     Oncologic History:     Ms. Elana Dodd is a 68 yo F with PMH of HTN, RA (on leflunomide), HLD, GERD, s/p R nephrectomy (1998, donated kidney to sister), R breast cancer (8/20/20, ER+/OR+/HER2 1+ by IHC/AMARIS+) s/p R lumpectomy and R SLN (11/5/2020) followed by carboplatin/Taxotere/Herceptin x4C (stopped due to SE) and R RT (completed 7/2021) on letrozole (since 7/2021), and stage IIA colon cancer s/p laparoscopic sigmoid colectomy and colostomy and en bloc resection with appendectomy and L ureterolysis (7/6/2020) with subsequent pelvic RT (completed 10/15/2020) lost to follow-up found to have recurrent metastatic colon cancer to the liver.     She was previously diagnosed and treated for her breast and colon cancers in [place].      6/23/2020 - PET/CT - hypermetabolic mass in sigmoid colon & hypermet mass in R breast      7/6/2020 - sigmoid colon resection - pathology: Moderately differentiated invasive adenocarcinoma, 5.5 cm in diameter. Invades through muscularis propria focally into subserosal tissue. Marked adhesion of small bowel segments to colon without involvement of malignancy, +0/28 LN, negative margins, small focus of carcinoma in the serosa of margin furthest from this tumor (pT3 N0), at least stage IIA     8/12/2020 - BL mammogram & US: 3.6 cm mass in R breast     8/20/2020 - R breast biopsy - path: Invasive poorly differentiated ductal carcinoma. ER positive and OR positive and HER-2 by IHC is 1+, but by HER-2 AMARIS is positive. On MammaPrint testing malignancy is ER and OR  positive and HER-2 positive, high risk, with a predicted 5-year metastasis free survival of 93% with chemotherapy and hormonal therapy versus 71% metastasis free survival with hormonal therapy alone.      10/15/2020 - Completed pelvic radiation      11/5/2020 - R lumpectomy and R SLNBx - path: 2.6 cm invasive ductal carcinoma, with all margins free of malignancy. The malignancy comes to within 1 mm of the closest deep margin. Perineural invasion is present. There is a focus of metastatic carcinoma, measuring 0.5 mm in 1 of 2 sentinel lymph nodes. pT2 pN1mic. Stage IIB     1/8/2021 - CT CAP - RLQ colostomy in tact, tiny nodules LLL nonspecific     5/11/2021 - CT CAP - stable small pulm nodules, worsening ventral hernia, interval takedown of LLQ ostomy, no evidence of met disease      Transitioned her care to her PCP where she was being prescribed letrozole. Has not had repeat mammogram or colonoscopy.     4/10/24 - Re-established care with medical oncology with Dr. Riccardo Blake at [facility]. Had not been seen by medical oncology since August 2021. Letrozole had been refilled by PCP. CT CAP, colonoscopy, CEA, CA 19-9, CA 27.29 were all ordered.      6/18/24 - CT A/P w contrast - large hepatic mass (7.5x8.7cm) involving both anterior segment of R lobe as well as medial segment of L lobe likely related to metastatic disease possibly colon cancer however met breast CA not excluded w hx; enlarged mohit hepatic LN likely metastatic, probably stable sidebranch IPMN and in pancreas   CT chest non con - stable tiny nodule superolateral LLL (3mm)     6/24/24 - Dr. Blake discussed imaging. Biopsy ordered.      7/1/24 - CT guided biopsy of liver lesion, path showed adenocarcinoma, metastatic from patient's known colonic primary, IHC +CK20, CDX2, SATB2 while negative for GATA3 and CK7, pMMR     Current Treatment:    7/24 - supposed to start FOLFOX. Had issues with skin open over port line.    7/31/24 - present: FOLFOX + panitumumab  (panitumumab added starting C3)     C4 - Stopped panitumumab per patient request as infusion time too long    Interval History:    Ms. Dodd is here today by herself for C5. She has been having a really rough time. Her sister passed away the Friday after her last treatment. They are planning on having her  and her daughter's wedding in [place] between  to  because the whole family will be out there. She's sleeping better. However, she had nausea and diarrhea this past cycle. Didn't take antiemetics or Imodium. She has a rash over her port site that goes up to her neck that is better. She's been putting steroid cream on it. Her R rib pain she had prior to starting treatment continues to improve. Today, she reports just feeling unwell overall. Cough with some clear sputum occasionally.    She went to ED a few days ago because of worsening bulging of neck vein above port. Cath tiffany was given and able to get blood return from port.    ROS:  10-pt ROS reviewed and negative except as mentioned above.     PMHx / FHx / PSHx: reviewed and are accurate    Medications: below was reviewed and is accurate    Current Outpatient Medications:     allopurinol (Zyloprim) 100 mg tablet, Take 3 tablets (300 mg) by mouth 2 times a day., Disp: , Rfl:     amLODIPine (Norvasc) 5 mg tablet, Take 1 tablet (5 mg) by mouth once daily., Disp: , Rfl:     atorvastatin (Lipitor) 20 mg tablet, Take 1 tablet (20 mg) by mouth once daily., Disp: , Rfl:     cholecalciferol (Vitamin D-3) 25 MCG (1000 UT) capsule, Take 1 capsule (25 mcg) by mouth once daily., Disp: , Rfl:     clindamycin (Cleocin T) 1 % lotion, Apply topically to affected area twice daily., Disp: 60 mL, Rfl: 3    escitalopram (Lexapro) 10 mg tablet, Take 1 tablet (10 mg) by mouth once daily., Disp: 30 tablet, Rfl: 2    hydrocortisone 2.5 % cream, Apply topically 2 times a day. To area of rash, Disp: 20 g, Rfl: 0    ibuprofen 800 mg tablet, Take 1 tablet (800 mg)  by mouth every 8 hours if needed., Disp: , Rfl:     leflunomide (Arava) 10 mg tablet, Take by mouth once daily., Disp: , Rfl:     letrozole (Femara) 2.5 mg tablet, Take 1 tablet (2.5 mg total) by mouth once daily.  Take with or without food., Disp: , Rfl:     LORazepam (Ativan) 0.5 mg tablet, Take 0.5 tablets (0.25 mg) by mouth every 8 hours if needed for anxiety., Disp: 30 tablet, Rfl: 0    minocycline 100 mg capsule, Take 1 capsule (100 mg) by mouth once every 24 hours. For rash prevention, Disp: 30 capsule, Rfl: 2    OLANZapine (ZyPREXA) 5 mg tablet, Take 1 tablet (5 mg) by mouth once daily at bedtime. 1 tab at bedtime for 3 days, starting the night of chemo, Disp: 30 tablet, Rfl: 3    omeprazole (PriLOSEC) 40 mg DR capsule, Take 1 capsule (40 mg) by mouth. Do not crush or chew., Disp: , Rfl:     oxyCODONE (Roxicodone) 15 mg immediate release tablet, Take 1 tablet (15 mg) by mouth every 6 hours if needed (pain). G89.3, Disp: 30 tablet, Rfl: 0    hydrocortisone 1 % cream, Apply topically to face, hands, feet, neck, back, and chest once daily at bedtime for rash prevention. (Patient not taking: Reported on 9/11/2024), Disp: 453.6 g, Rfl: 3    hydrOXYzine HCL (Atarax) 10 mg tablet, Take 1 tablet (10 mg) by mouth every 6 hours if needed for anxiety. (Patient not taking: Reported on 8/14/2024), Disp: 30 tablet, Rfl: 0    loperamide (Imodium) 2 mg capsule, Take 2 capsules (4 mg) by mouth with the first episode of diarrhea and 1 capsule (2 mg) by mouth with any additional episodes. Maximum 8 capsules (16 mg) per day. (Patient not taking: Reported on 8/28/2024), Disp: 30 capsule, Rfl: 2    ondansetron (Zofran) 8 mg tablet, Take 1 tablet (8 mg) by mouth every 8 hours if needed for nausea or vomiting. (Patient not taking: Reported on 9/25/2024), Disp: 30 tablet, Rfl: 5    prochlorperazine (Compazine) 10 mg tablet, Take 1 tablet (10 mg) by mouth every 6 hours if needed for nausea or vomiting. (Patient not taking: Reported  on 2024), Disp: 30 tablet, Rfl: 5    traZODone (Desyrel) 50 mg tablet, Take 1 tablet (50 mg) by mouth once daily at bedtime. (Patient not taking: Reported on 2024), Disp: 30 tablet, Rfl: 0    Physical exam:  Vitals:    24 0815   BP: 120/82   BP Location: Right arm   Patient Position: Sitting   BP Cuff Size: Adult   Pulse: 95   Resp: 18   Temp: 36.4 °C (97.5 °F)   TempSrc: Temporal   SpO2: 98%   Weight: 69.7 kg (153 lb 10.6 oz)   ECO  GEN: NAD, appears tired, tearful, anxious   LUNGS: normal respiratory effort  EXT: No deformities or edema  NEURO: Grossly intact. No focal deficits.  HEME: No signs of easy bruising or active bleeding.  PSYCH: Appropriate mood and affect  Skin: +dark rash (healing) over port and up side of neck    Laboratory review:    24 labs reviewed     ASSESSMENT AND PLAN:     Ms. Elana Dodd is a 68 yo F with PMH of HTN, RA (on leflunomide), HLD, GERD, s/p R nephrectomy (, donated kidney to sister), R breast cancer (20, ER+/VT+/HER2 1+ by IHC/AMARIS+) s/p R lumpectomy and R SLN (2020) followed by carboplatin/Taxotere/Herceptin x4C (stopped due to SE) and R RT (completed 2021) on letrozole (since 2021), and stage IIA colon cancer s/p laparoscopic sigmoid colectomy and colostomy and en bloc resection with appendectomy and L ureterolysis (2020) with subsequent pelvic RT (completed 10/15/2020) lost to follow-up found to have recurrent metastatic colon cancer to the liver here today for follow-up.     Ms. Dodd has a lot of life stressors going on right now including the tragic passing of her sister. She is not feeling well today.     Metastatic colon cancer to the liver: Hold chemo this week and move C5 to next week. Scan read is not back yet. Have to get [facility] CT CAP from  uploaded to our system to compare as well. I will call her once I have CT scan results. She will be gone for daughter's wedding and sister's  in [place] -.    Anxiety: Severe. Hydroxyzine 20mg PRN and lorazepam 0.5mg PRN. Declined escitalopram. Referral to onco-psych. I also recommended she reach out to the [facility].    Rash: over port. Improving. Most likely contact dermatitis.    Pain (R rib and shoulder): Improved. Controlled with PRN oxycodone.    Constipation: Recommended taking senna and Miralax if taking oxycodone daily.   Nausea: Olanzapine nightly. Zofran and Compazine as needed.   Insomnia: Improved. Reiterated sleep hygiene - no screen time before bed. Olanzapine nightly. I do think this is from her uncontrolled anxiety. Referral with supp onc, appt scheduled.  Anemia: Next IV iron next treatment (for 2 total doses)    RTC in 1 week for C5 FOLFOX.    Felipa Hu MD  Staff, Gastrointestinal Medical Oncology  [facility]

## 2024-09-27 ENCOUNTER — APPOINTMENT (OUTPATIENT)
Dept: HEMATOLOGY/ONCOLOGY | Facility: CLINIC | Age: 69
End: 2024-09-27
Payer: COMMERCIAL

## 2024-09-27 ENCOUNTER — TELEPHONE (OUTPATIENT)
Dept: HEMATOLOGY/ONCOLOGY | Facility: HOSPITAL | Age: 69
End: 2024-09-27
Payer: COMMERCIAL

## 2024-09-27 NOTE — TELEPHONE ENCOUNTER
Called Ms. Dodd to discuss scan results.    Went to . No name stated on recording. I stated my name and that I was calling to discuss scan results.      Felipa Hu MD  Staff, Gastrointestinal Medical Oncology  [facility]

## 2024-10-01 ENCOUNTER — OFFICE VISIT (OUTPATIENT)
Dept: PALLIATIVE MEDICINE | Facility: CLINIC | Age: 69
End: 2024-10-01
Payer: COMMERCIAL

## 2024-10-01 ENCOUNTER — TELEPHONE (OUTPATIENT)
Dept: PALLIATIVE MEDICINE | Facility: CLINIC | Age: 69
End: 2024-10-01
Payer: COMMERCIAL

## 2024-10-01 VITALS
HEART RATE: 81 BPM | BODY MASS INDEX: 24.15 KG/M2 | TEMPERATURE: 97.9 F | WEIGHT: 154.21 LBS | RESPIRATION RATE: 17 BRPM | DIASTOLIC BLOOD PRESSURE: 82 MMHG | OXYGEN SATURATION: 100 % | SYSTOLIC BLOOD PRESSURE: 138 MMHG

## 2024-10-01 DIAGNOSIS — C78.7 METASTATIC COLON CANCER TO LIVER (MULTI): ICD-10-CM

## 2024-10-01 DIAGNOSIS — C18.9 MALIGNANT NEOPLASM OF COLON, UNSPECIFIED PART OF COLON (MULTI): ICD-10-CM

## 2024-10-01 DIAGNOSIS — F41.1 ANXIETY ASSOCIATED WITH CANCER DIAGNOSIS (MULTI): ICD-10-CM

## 2024-10-01 DIAGNOSIS — Z71.89 GOALS OF CARE, COUNSELING/DISCUSSION: ICD-10-CM

## 2024-10-01 DIAGNOSIS — Z51.5 PALLIATIVE CARE ENCOUNTER: Primary | ICD-10-CM

## 2024-10-01 DIAGNOSIS — C18.9 METASTATIC COLON CANCER TO LIVER (MULTI): ICD-10-CM

## 2024-10-01 DIAGNOSIS — G89.3 CANCER RELATED PAIN: ICD-10-CM

## 2024-10-01 DIAGNOSIS — G47.00 INSOMNIA, UNSPECIFIED TYPE: ICD-10-CM

## 2024-10-01 DIAGNOSIS — C80.1 ANXIETY ASSOCIATED WITH CANCER DIAGNOSIS (MULTI): ICD-10-CM

## 2024-10-01 PROCEDURE — 99215 OFFICE O/P EST HI 40 MIN: CPT | Performed by: NURSE PRACTITIONER

## 2024-10-01 PROCEDURE — 1126F AMNT PAIN NOTED NONE PRSNT: CPT | Performed by: NURSE PRACTITIONER

## 2024-10-01 RX ORDER — DOXEPIN HYDROCHLORIDE 10 MG/1
10 CAPSULE ORAL NIGHTLY PRN
Qty: 30 CAPSULE | Refills: 0 | Status: SHIPPED | OUTPATIENT
Start: 2024-10-01 | End: 2024-10-31

## 2024-10-01 RX ORDER — OXYCODONE HYDROCHLORIDE 15 MG/1
15 TABLET ORAL EVERY 6 HOURS PRN
Qty: 120 TABLET | Refills: 0 | Status: SHIPPED | OUTPATIENT
Start: 2024-10-01 | End: 2024-10-31

## 2024-10-01 RX ORDER — LORAZEPAM 0.5 MG/1
0.5 TABLET ORAL EVERY 8 HOURS PRN
Qty: 90 TABLET | Refills: 3 | Status: SHIPPED | OUTPATIENT
Start: 2024-10-01 | End: 2024-10-31

## 2024-10-01 ASSESSMENT — COLUMBIA-SUICIDE SEVERITY RATING SCALE - C-SSRS
6. HAVE YOU EVER DONE ANYTHING, STARTED TO DO ANYTHING, OR PREPARED TO DO ANYTHING TO END YOUR LIFE?: NO
2. HAVE YOU ACTUALLY HAD ANY THOUGHTS OF KILLING YOURSELF?: NO
1. IN THE PAST MONTH, HAVE YOU WISHED YOU WERE DEAD OR WISHED YOU COULD GO TO SLEEP AND NOT WAKE UP?: NO

## 2024-10-01 ASSESSMENT — PATIENT HEALTH QUESTIONNAIRE - PHQ9
SUM OF ALL RESPONSES TO PHQ9 QUESTIONS 1 AND 2: 0
2. FEELING DOWN, DEPRESSED OR HOPELESS: NOT AT ALL
1. LITTLE INTEREST OR PLEASURE IN DOING THINGS: NOT AT ALL

## 2024-10-01 ASSESSMENT — PAIN SCALES - GENERAL: PAINLEVEL: 0-NO PAIN

## 2024-10-01 NOTE — TELEPHONE ENCOUNTER
Phone call to pharmacy to see if PA needed for medications prescribed today. No PA needed, patient updated. Patient also updated to attend oncology appointment tomorrow and will further discuss treatment tomorrow at that visit. Patient is agreeable and will see Dr. Hu for scheduled appointment then.

## 2024-10-01 NOTE — PROGRESS NOTES
"SUPPORTIVE AND PALLIATIVE ONCOLOGY CONSULT - OUTPATIENT      SERVICE DATE: 10/1/2024    Referred by:  Dr. Hu  Medical Oncologist: Felipa Hu MD   Radiation Oncologist: No care team member to display  Primary Physician: Rafat Mcclendon  [phone]    REASON FOR CONSULT/CHIEF CONSULT COMPLAINT: pain management, other symptom control- anxiety, insomnia, and Introduction to Supportive and Palliative Oncology Services    Cancer History  Recurrent colon CA with liver mets   -7/24 - supposed to start FOLFOX. Had issues with skin open over port line.  -7/31/24 - present: FOLFOX + panitumumab (panitumumab added starting C3)   -C4 - Stopped panitumumab per patient request as infusion time too long    Onc: Mayte     Subjective   HISTORY OF PRESENT ILLNESS: Elana Dodd is a 69 y.o. female with Pmhx of RA, GERD, R nephrectomy (donated kidney to sister in 1998), R breast CA (2020), and metastatic colon CA.     She presents to supportive oncology for pain and symptom management.     Pt presents for new pt visit alone. She c/o pain in her R back, describes it as constant aching, feels r/t liver mets, last 2 days has had increased pain. Pain gets worse when she tries to lay on her back, usually has to turn onto her side. She is currently taking oxycodone 15mg about once/day, no OTCs. Has numbness/tingling in her hands when they get cold. Moves bowels regularly, sometimes struggles with constipation. Has intermittent nausea, usually around treatment time, no vomiting. Appetite is \"up and down,\" feels she hydrates well, recently gained a pound. Struggles with severe anxiety, states she \"over thinks\" a lot. She is currently taking ativan 0.5mg 2-3x/day, getting some relief. Does not want daily maintenance medication for anxiety at this time. Has had trouble sleeping since starting chemo, takes pain medication and ativan at bed time to help her sleep, sometimes takes melatonin. Energy levels are low, feels exhausted all " the time. She does have advance directives, she signed DNR- comfort care arrest form today.     Pain Assessment:  Pain Score: 3  Location:  back  Education:        Symptom Assessment:  Pain:a little  Headache: none  Dizziness:none  Lack of energy: somewhat  Difficulty sleeping: somewhat  Worrying: very much  Anxiety: very much  Depression: a little  Pain in mouth/swallowing: none  Dry mouth: none  Taste changes: none  Shortness of breath: none  Lack of appetite: a little   Nausea: a little  Vomiting: none  Constipation: a little  Diarrhea: none  Sore muscles: none  Numbness or tingling in hands/feet/other: a little  Weight loss: none      Information obtained from: chart review and interview of patient  ______________________________________________________________________     Oncology History   Colon cancer (Multi)   7/10/2024 Initial Diagnosis    Colon cancer (Multi)     7/31/2024 - 8/16/2024 Chemotherapy    mFOLFOX6 (Fluorouracil Continuous Infusion / Oxaliplatin), 14 Day Cycles     8/28/2024 -  Chemotherapy    Panitumumab + mFOLFOX6 (Fluorouracil Continuous Infusion / Leucovorin / Oxaliplatin), 14 Day Cycles     Metastatic colon cancer to liver (Multi)   7/12/2024 Initial Diagnosis    Metastatic colon cancer to liver (Multi)     7/31/2024 - 8/16/2024 Chemotherapy    mFOLFOX6 (Fluorouracil Continuous Infusion / Oxaliplatin), 14 Day Cycles     8/28/2024 -  Chemotherapy    Panitumumab + mFOLFOX6 (Fluorouracil Continuous Infusion / Leucovorin / Oxaliplatin), 14 Day Cycles         Past Medical History:   Diagnosis Date    Breast cancer (Multi)     2020 right breast    Colon cancer (Multi)     2020    GERD (gastroesophageal reflux disease)     HLD (hyperlipidemia)     HTN (hypertension)     Rheumatoid arthritis (Multi)      Past Surgical History:   Procedure Laterality Date    NEPHRECTOMY      1998, donated to sister    TOE SURGERY Left     3/2023    TOE SURGERY Right     4/2024     Family History   Problem Relation  Name Age of Onset    Other (mouth cancer) Sister          SOCIAL HISTORY  -, lives in a house with her cousin (female), has 3 daughters, 1 step daughter, 3 step grand sons  -currently on disability, retired first, worked as    -currently vapes tobacco, history of ETOH misuse x 10 yrs, 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 Last alcohol about a year ago. No illicits/ THC.    Church and Importance of Church:  Synagogue, involved in [employer] community     REVIEW OF SYSTEMS  Review of systems negative unless noted in HPI.       Objective     Palliative Performance Scale % (PPS)       Current Outpatient Medications   Medication Instructions    allopurinol (ZYLOPRIM) 300 mg, oral, 2 times daily    amLODIPine (Norvasc) 5 mg tablet 1 tablet, oral, Daily    atorvastatin (Lipitor) 20 mg tablet 1 tablet, oral, Daily    cholecalciferol (Vitamin D-3) 25 MCG (1000 UT) capsule 1 capsule, oral, Daily    clindamycin (Cleocin T) 1 % lotion Apply topically to affected area twice daily.    escitalopram (LEXAPRO) 10 mg, oral, Daily    hydrocortisone 1 % cream Apply topically to face, hands, feet, neck, back, and chest once daily at bedtime for rash prevention.    hydrocortisone 2.5 % cream Topical, 2 times daily, To area of rash    hydrOXYzine HCL (ATARAX) 10 mg, oral, Every 6 hours PRN    ibuprofen 800 mg tablet 1 tablet, oral, Every 8 hours PRN    leflunomide (Arava) 10 mg tablet oral, Daily RT    letrozole (FEMARA) 2.5 mg, oral, Daily, Take with or without food.    loperamide (Imodium) 2 mg capsule Take 2 capsules (4 mg) by mouth with the first episode of diarrhea and 1 capsule (2 mg) by mouth with any additional episodes. Maximum 8 capsules (16 mg) per day.    LORazepam (ATIVAN) 0.25 mg, oral, Every 8 hours PRN    minocycline 100 mg, oral, Every 24 hours, For rash prevention    OLANZapine (ZYPREXA) 5 mg, oral, Nightly, 1 tab at bedtime for 3 days, starting the night of chemo    omeprazole (PRILOSEC) 40 mg, oral, Do not crush or chew.     ondansetron (ZOFRAN) 8 mg, oral, Every 8 hours PRN    oxyCODONE (ROXICODONE) 15 mg, oral, Every 6 hours PRN, G89.3    prochlorperazine (COMPAZINE) 10 mg, oral, Every 6 hours PRN    traZODone (DESYREL) 50 mg, oral, Nightly       Allergies: No Known Allergies             PHYSICAL EXAMINATION  Vital Signs:   Vital signs reviewed  Vitals:    10/01/24 1309   BP: 138/82   Pulse: 81   Resp: 17   Temp: 36.6 °C (97.9 °F)   SpO2: 100%     Pain Score: 3     Physical Exam  Vitals reviewed.   Constitutional:       Appearance: Normal appearance.   HENT:      Head: Normocephalic.   Cardiovascular:      Rate and Rhythm: Normal rate.   Pulmonary:      Effort: Pulmonary effort is normal.   Abdominal:      Palpations: Abdomen is soft.   Musculoskeletal:         General: Normal range of motion.   Skin:     General: Skin is warm and dry.   Neurological:      General: No focal deficit present.      Mental Status: She is alert and oriented to person, place, and time.   Psychiatric:         Mood and Affect: Mood is anxious.         Judgment: Judgment normal.         ASSESSMENT/PLAN    Pain  Pain is: cancer related pain  Type: visceral  Pain control: well-controlled  Home regimen:   -continue oxycodone 15mg q6hrs PRN. Rx sent today.   Intolerances/previously tried:     Opioid Use  Medication Management:   - OARRS report reviewed with no aberrant behavior; consistent with  prescriptions/records and patient history  - MED 0.  Overdose Risk Score 100.   This has been discussed with patient.   - We will continue to closely monitor the patient for signs of prescription misuse including UDS, OARRS review and subjective reports at each visit.  - concurrent benzodiazepine use with ativan, educated pt on medication safety when used in combination with opiates    - I am a provider who either is or has consulted and collaborated with a provider certified in Hospice and Palliative Medicine and have conducted a face-face visit and examination for this  patient.  - Routine Urine Drug Screen completed next visit appropriately positive for opioids and negative for illicit substances  - Controlled Substance Agreement completed next visit  - Specifically discussed that controlled substance prescriptions will only be provided by our group as outlined in the completed agreement  - Prescribed naloxone deferred  - Red Flags: 10 yr history of ETOH misuse, in recovery for 8 yrs     Nausea   Intermittent nausea without vomiting related to chemotherapy   -continue zofran 8mg q8hrs PRN.   -continue compazine 10mg q6hrs PRN.     Constipation   At risk for constipation related to opioids,  currently constipated   Usual bowel pattern: q1-2 days   Current regimen:   -educated pt on importance of maintaining regular bowel schedule  -start colace 100mg bid PRN for hard stools  -start senna 8.6mg, 1-2tabs, 1-2x/day  -start MOM 15mL 4x/day PRN    Altered Mood  Acute on chronic anxiety related to health concerns   uncontrolled with home regimen  Current regimen:   -increase ativan to 0.5mg tid PRN. Rx sent today.   -discussed daily medication management, pt declined at this time    Sleeping Difficulty:  Impaired sleep related to insomnia, anxiety, pain  Current regimen:    -goal for improved sleep with better pain/ anxiety control   -see pain/ mood section/ plan above   -start doxepin 10mg at bedtime. Rx sent today.     Decreased appetite  Related to malignancy  Current regimen:    -encouraged smaller, more frequent meals through out the day  -encouraged use of supplements such as Boost, Ensure, etc.       Supportive Interventions: filled out [place] DNR with pt per her request    Introduction to Supportive and Palliative Oncology:  Spoke with patient   Introduced the role and philosophy of Supportive and Palliative oncology in the evaluation and management of symptoms during cancer treatment  Palliative care was introduced as a service for patients with serious illness to help with  symptoms, assist with goals of care conversations, navigate complex decision making, improve quality of life for patients, and provide support both patients and families.  Patient seemed to appreciate the extra layer of support.    Advance Directives  Existence of Advance Directives:Yes, but NOT documented in medical record  Decision maker: Surrogate decision maker is Jose Carlos Shelton (daughter) and Darya Leal (daughter)  Code Status: DNR- comfort care arrest           Next Follow-Up Visit:  Return to clinic in 3 weeks     Signature and billing  Thank you for allowing us to participate in the care of this patient. Recommendations will be communicated back to the consulting service by way of shared electronic medical record or face-to-face.    Medical complexity was high level due to due to complexity of problems, extensive data review, and high risk of management/treatment.  Time was spent on the following: Prep Time, Time Directly with Patient/Family/Caregiver, Documentation Time. Total time spent: 65 mins      DATA   Diagnostic tests and information reviewed for today's visit:  Most recent labs       Some elements copied from oncology note on 9/25/24, the elements have been updated and all reflect current decision making from today, 10/1/2024.      Plan of Care discussed with: Patient and MANUEL Stern      SIGNATURE: Gege Medellin, APRN-CNP    Contact information:  Supportive and Palliative Oncology  Monday-Friday 8 AM-5 PM  Phone:  [phone], press option #5, then option #1.   Or Epic Secure Chat

## 2024-10-02 ENCOUNTER — TELEPHONE (OUTPATIENT)
Dept: HEMATOLOGY/ONCOLOGY | Facility: CLINIC | Age: 69
End: 2024-10-02
Payer: COMMERCIAL

## 2024-10-02 ENCOUNTER — APPOINTMENT (OUTPATIENT)
Dept: HEMATOLOGY/ONCOLOGY | Facility: CLINIC | Age: 69
End: 2024-10-02
Payer: COMMERCIAL

## 2024-10-02 NOTE — TELEPHONE ENCOUNTER
"Called Elana Olmos back to let her know that she needs to be at [facility] infusion center no later 11 am for treatment or she will not be able to get treatment today. Elana stated \" I am going to try to get up and walk around and see how I feel, my liver just scared me last night. I talked to Gege yesterday about delaying my treatment this week until I get back from [place] because I felt so good yesterday but now I am scared that I messed up\". This RN then asked her why she canceled her appointment with Dr. Welch and she said well I got a call that I was on a cancellation list around noon one day and that he was going to call around 3 pm for a visit but he never called. So I called the office and they said that I had cancelled the appointment. I saw psychology yesterday when I saw Gege Medellin so I did not think I needed the appointment with Dr. Welch. This RN informed her that Gege Medellin CNP is not psychology she is from supportive oncology to help Dr. Hu manage her symptoms and she really needs to be seen by Dr. Welch. This RN then asked Elana if she was going to be able to be here at 11 am for treatment and she stated \" I literally have not got up yet today so I need to see how I feel, so I will call you back in a half\". This RN informed her that if she is not here by 11 am today that she can not be treated this week due to no space in the infusion center. No barriers to education noted, patient agreed to plan and verbalized understanding using the teach back method.    "

## 2024-10-02 NOTE — TELEPHONE ENCOUNTER
"Called and spoke to Elana Olmos due to her being late for her appointment this morning. Stated \" I was up all night every hour, flair up of gout in my right wrist so I can not use that hand and I am just so sick. I think the gout is upsetting my liver, at 130 am I had the sharpest pain in my liver, it felt like it was kicking and doing flips. I just could not do it. I almost called an ambulance, I feel like I screwed up not doing my chemo treatment last week. I think I am out of time\". She stated that she took oxycodone and her anxiety pill which helped relieve her pain and she was feeling better this morning after she woke up. She asked if she could come in later today if she was feeling better and that their are 5 cousins at her house and maybe her one cousin could bring her later. This RN informed her that I needed to reach out to the infusion charge RN and review Dr. Hu to see if we can get her in later today and I would return her call. No barriers to education noted, patient agreed to plan and verbalized understanding using the teach back method.      "

## 2024-10-04 ENCOUNTER — TELEPHONE (OUTPATIENT)
Dept: PALLIATIVE MEDICINE | Facility: CLINIC | Age: 69
End: 2024-10-04
Payer: COMMERCIAL

## 2024-10-04 ENCOUNTER — APPOINTMENT (OUTPATIENT)
Dept: HEMATOLOGY/ONCOLOGY | Facility: CLINIC | Age: 69
End: 2024-10-04
Payer: COMMERCIAL

## 2024-10-04 ENCOUNTER — TELEPHONE (OUTPATIENT)
Dept: HEMATOLOGY/ONCOLOGY | Facility: CLINIC | Age: 69
End: 2024-10-04
Payer: COMMERCIAL

## 2024-10-04 DIAGNOSIS — C18.9 METASTATIC COLON CANCER TO LIVER (MULTI): ICD-10-CM

## 2024-10-04 DIAGNOSIS — C18.9 MALIGNANT NEOPLASM OF COLON, UNSPECIFIED PART OF COLON (MULTI): Primary | ICD-10-CM

## 2024-10-04 DIAGNOSIS — C78.7 METASTATIC COLON CANCER TO LIVER (MULTI): ICD-10-CM

## 2024-10-04 NOTE — TELEPHONE ENCOUNTER
Phone call to patient to check in since NPV with Gege Medellin CNP on Tuesday. VM left asking for return call if patient had questions/concerns as will be traveling out of town next week. Call back number provided.

## 2024-10-08 ENCOUNTER — TELEPHONE (OUTPATIENT)
Dept: HEMATOLOGY/ONCOLOGY | Facility: CLINIC | Age: 69
End: 2024-10-08
Payer: COMMERCIAL

## 2024-10-08 NOTE — TELEPHONE ENCOUNTER
"Called patient back, asked her if she took her temperature, she stated yes and denies having a fever. Stated \"I get these hot flashes and then I am freezing cold. Then I sleep for days\". I asked her if she called her primary care office and she stated yes, she is waiting to hear back from them.   "

## 2024-10-08 NOTE — TELEPHONE ENCOUNTER
"Returned call to Michell per Dr. Hu, I informed her that she needed to contact her PCP's office regarding her gout. Elana stated \" I have emergency gout medication and home but I do not know how often I can take them if its two pills every 5 days or what\". I restated that she will nee to call her PCP office to get further directions on how to use her medication or she can go to the local ER for evaluation. Elana stated \"I don't know what's flaring it up but I can't do the ER again, I am just to sick. I missed my flight this morning. The past week and a half, I've just been so sick, just like the wave of sickness I got from when I was diagnosed with the cancer and I am sleeping sometimes of 24 hours a day\". I reiterated that she can go to the ED to be evaluated and she declined. She stated she will call her primary care physicians office about her medication. Patient requesting a letter be placed in [facility] by oncologist stating why she missed her flight so she can get reimbursed. I informed Elana that I would reach out to Dr. Hu. No barriers to education noted, patient agreed to plan and verbalized understanding using the teach back method.    "

## 2024-10-08 NOTE — ACP (ADVANCE CARE PLANNING)
Advance Care Planning Note     Discussion Date: 10/1/24  Discussion Participants: patient    The patient wishes to discuss Advance Care Planning today and the following is a brief summary of our discussion.     Patient has capacity to make their own medical decisions: Yes  Health Care Agent/Surrogate Decision Maker documented in chart: No    Documents on file and valid:  Advance Directive/Living Will: No   Health Care Power of : No  [place] DNR form: signed 10/1/24    Communication of Medical Status/Prognosis:   Pt aware of metastatic disease, does not wish to receive life saving measures    Communication of Treatment Goals/Options:   Pt unsure if she would like to continue treatment, given side effects.     Treatment Decisions  Goals of Care: quality of life is prioritized; willing to accept low-burden treatments to achieve meaningful goals   Pt is DNR- comfort care arrest     Team Members  Gege Medellin CNP  Time Statement: Total face to face time spent on advance care planning was 15 minutes with 15 minutes spent in counseling, including the explanation.    NIKKI Snow-CNP  10/8/2024 5:09 PM

## 2024-10-09 ENCOUNTER — APPOINTMENT (OUTPATIENT)
Dept: HEMATOLOGY/ONCOLOGY | Facility: CLINIC | Age: 69
End: 2024-10-09
Payer: COMMERCIAL

## 2024-10-11 ENCOUNTER — APPOINTMENT (OUTPATIENT)
Dept: HEMATOLOGY/ONCOLOGY | Facility: CLINIC | Age: 69
End: 2024-10-11
Payer: COMMERCIAL

## 2024-10-11 ENCOUNTER — TELEPHONE (OUTPATIENT)
Dept: HEMATOLOGY/ONCOLOGY | Facility: CLINIC | Age: 69
End: 2024-10-11
Payer: COMMERCIAL

## 2024-10-14 ENCOUNTER — DOCUMENTATION (OUTPATIENT)
Dept: HEMATOLOGY/ONCOLOGY | Facility: HOSPITAL | Age: 69
End: 2024-10-14
Payer: COMMERCIAL

## 2024-10-14 ENCOUNTER — TELEPHONE (OUTPATIENT)
Dept: HEMATOLOGY/ONCOLOGY | Facility: CLINIC | Age: 69
End: 2024-10-14
Payer: COMMERCIAL

## 2024-10-15 ENCOUNTER — TELEPHONE (OUTPATIENT)
Dept: HEMATOLOGY/ONCOLOGY | Facility: HOSPITAL | Age: 69
End: 2024-10-15
Payer: COMMERCIAL

## 2024-10-15 NOTE — TELEPHONE ENCOUNTER
Left a vm on an unidentifiable voicemail at [phone] for her virtual appointment today. Stating who I was and I was calling from Dr. Hu office and to please call back to reschedule her appointment. Left phone number to call back.

## 2024-10-16 ENCOUNTER — INFUSION (OUTPATIENT)
Dept: HEMATOLOGY/ONCOLOGY | Facility: CLINIC | Age: 69
End: 2024-10-16
Payer: COMMERCIAL

## 2024-10-16 VITALS
TEMPERATURE: 96.8 F | WEIGHT: 147.38 LBS | OXYGEN SATURATION: 99 % | BODY MASS INDEX: 23.08 KG/M2 | RESPIRATION RATE: 16 BRPM | SYSTOLIC BLOOD PRESSURE: 127 MMHG | DIASTOLIC BLOOD PRESSURE: 83 MMHG | HEART RATE: 85 BPM

## 2024-10-16 DIAGNOSIS — R79.0 LOW MAGNESIUM LEVEL: Primary | ICD-10-CM

## 2024-10-16 DIAGNOSIS — C18.9 METASTATIC COLON CANCER TO LIVER (MULTI): ICD-10-CM

## 2024-10-16 DIAGNOSIS — C22.7 OTHER SPECIFIED CARCINOMAS OF LIVER: ICD-10-CM

## 2024-10-16 DIAGNOSIS — C18.9 MALIGNANT NEOPLASM OF COLON, UNSPECIFIED PART OF COLON (MULTI): ICD-10-CM

## 2024-10-16 DIAGNOSIS — C78.7 METASTATIC COLON CANCER TO LIVER (MULTI): ICD-10-CM

## 2024-10-16 LAB
ALBUMIN SERPL BCP-MCNC: 3.7 G/DL (ref 3.4–5)
ALP SERPL-CCNC: 578 U/L (ref 33–136)
ALT SERPL W P-5'-P-CCNC: 26 U/L (ref 7–45)
ANION GAP SERPL CALC-SCNC: 16 MMOL/L (ref 10–20)
AST SERPL W P-5'-P-CCNC: 62 U/L (ref 9–39)
BASOPHILS # BLD AUTO: 0.03 X10*3/UL (ref 0–0.1)
BASOPHILS NFR BLD AUTO: 0.3 %
BILIRUB SERPL-MCNC: 0.5 MG/DL (ref 0–1.2)
BUN SERPL-MCNC: 9 MG/DL (ref 6–23)
CALCIUM SERPL-MCNC: 9.5 MG/DL (ref 8.6–10.3)
CEA SERPL-MCNC: 35.7 UG/L
CHLORIDE SERPL-SCNC: 98 MMOL/L (ref 98–107)
CO2 SERPL-SCNC: 25 MMOL/L (ref 21–32)
CREAT SERPL-MCNC: 0.75 MG/DL (ref 0.5–1.05)
EGFRCR SERPLBLD CKD-EPI 2021: 87 ML/MIN/1.73M*2
EOSINOPHIL # BLD AUTO: 0.1 X10*3/UL (ref 0–0.7)
EOSINOPHIL NFR BLD AUTO: 1 %
ERYTHROCYTE [DISTWIDTH] IN BLOOD BY AUTOMATED COUNT: 20.5 % (ref 11.5–14.5)
GIANT PLATELETS BLD QL SMEAR: NORMAL
GLUCOSE SERPL-MCNC: 115 MG/DL (ref 74–99)
HCT VFR BLD AUTO: 31.8 % (ref 36–46)
HGB BLD-MCNC: 9.9 G/DL (ref 12–16)
IMM GRANULOCYTES # BLD AUTO: 0.04 X10*3/UL (ref 0–0.7)
IMM GRANULOCYTES NFR BLD AUTO: 0.4 % (ref 0–0.9)
LYMPHOCYTES # BLD AUTO: 0.89 X10*3/UL (ref 1.2–4.8)
LYMPHOCYTES NFR BLD AUTO: 8.7 %
MAGNESIUM SERPL-MCNC: 1.21 MG/DL (ref 1.6–2.4)
MCH RBC QN AUTO: 28.2 PG (ref 26–34)
MCHC RBC AUTO-ENTMCNC: 31.1 G/DL (ref 32–36)
MCV RBC AUTO: 91 FL (ref 80–100)
MONOCYTES # BLD AUTO: 0.87 X10*3/UL (ref 0.1–1)
MONOCYTES NFR BLD AUTO: 8.5 %
NEUTROPHILS # BLD AUTO: 8.28 X10*3/UL (ref 1.2–7.7)
NEUTROPHILS NFR BLD AUTO: 81.1 %
NRBC BLD-RTO: 0 /100 WBCS (ref 0–0)
OVALOCYTES BLD QL SMEAR: NORMAL
PLATELET # BLD AUTO: 217 X10*3/UL (ref 150–450)
POLYCHROMASIA BLD QL SMEAR: NORMAL
POTASSIUM SERPL-SCNC: 3.5 MMOL/L (ref 3.5–5.3)
PROT SERPL-MCNC: 7.4 G/DL (ref 6.4–8.2)
RBC # BLD AUTO: 3.51 X10*6/UL (ref 4–5.2)
RBC MORPH BLD: NORMAL
SCHISTOCYTES BLD QL SMEAR: NORMAL
SODIUM SERPL-SCNC: 135 MMOL/L (ref 136–145)
WBC # BLD AUTO: 10.2 X10*3/UL (ref 4.4–11.3)

## 2024-10-16 PROCEDURE — 82378 CARCINOEMBRYONIC ANTIGEN: CPT | Mod: WESLAB

## 2024-10-16 PROCEDURE — 96366 THER/PROPH/DIAG IV INF ADDON: CPT | Mod: INF

## 2024-10-16 PROCEDURE — 84075 ASSAY ALKALINE PHOSPHATASE: CPT

## 2024-10-16 PROCEDURE — 2500000004 HC RX 250 GENERAL PHARMACY W/ HCPCS (ALT 636 FOR OP/ED): Performed by: STUDENT IN AN ORGANIZED HEALTH CARE EDUCATION/TRAINING PROGRAM

## 2024-10-16 PROCEDURE — 85025 COMPLETE CBC W/AUTO DIFF WBC: CPT

## 2024-10-16 PROCEDURE — 96365 THER/PROPH/DIAG IV INF INIT: CPT | Mod: INF

## 2024-10-16 PROCEDURE — 83735 ASSAY OF MAGNESIUM: CPT

## 2024-10-16 RX ORDER — HEPARIN 100 UNIT/ML
500 SYRINGE INTRAVENOUS AS NEEDED
OUTPATIENT
Start: 2024-10-16

## 2024-10-16 RX ORDER — HEPARIN SODIUM,PORCINE/PF 10 UNIT/ML
50 SYRINGE (ML) INTRAVENOUS AS NEEDED
Status: DISCONTINUED | OUTPATIENT
Start: 2024-10-16 | End: 2024-10-16 | Stop reason: HOSPADM

## 2024-10-16 RX ORDER — HEPARIN SODIUM,PORCINE/PF 10 UNIT/ML
50 SYRINGE (ML) INTRAVENOUS AS NEEDED
OUTPATIENT
Start: 2024-10-16

## 2024-10-16 RX ORDER — HEPARIN 100 UNIT/ML
500 SYRINGE INTRAVENOUS AS NEEDED
Status: DISCONTINUED | OUTPATIENT
Start: 2024-10-16 | End: 2024-10-16 | Stop reason: HOSPADM

## 2024-10-16 RX ORDER — LANOLIN ALCOHOL/MO/W.PET/CERES
400 CREAM (GRAM) TOPICAL ONCE
Status: COMPLETED | OUTPATIENT
Start: 2024-10-16 | End: 2024-10-16

## 2024-10-16 RX ORDER — MAGNESIUM SULFATE HEPTAHYDRATE 40 MG/ML
4 INJECTION, SOLUTION INTRAVENOUS ONCE
Status: COMPLETED | OUTPATIENT
Start: 2024-10-16 | End: 2024-10-16

## 2024-10-16 ASSESSMENT — PAIN SCALES - GENERAL: PAINLEVEL_OUTOF10: 0-NO PAIN

## 2024-10-16 NOTE — PROGRESS NOTES
Pt presents to day for D1C5 of FOLFOX. Pt missed her provider appointment yesterday, pt stated she was unaware of said appointment. Ordered labs were drawn and sent. Mag resulted at 1.21 and Alkaline Phosphatase was 578, made Sabi Tapia CNP aware. Per Sabi, hold treatment today, administered 4 grams of IV iron and also 400 mg of oral mag. STAT ultrasound of the liver was ordered and scheduled for tomorrow. Patient will have a follow up treatment next week with Dr. Hu. Pt made aware of plan and verbalized understanding.

## 2024-10-17 ENCOUNTER — HOSPITAL ENCOUNTER (OUTPATIENT)
Dept: RADIOLOGY | Facility: HOSPITAL | Age: 69
Discharge: HOME | End: 2024-10-17
Payer: COMMERCIAL

## 2024-10-17 DIAGNOSIS — C22.7 OTHER SPECIFIED CARCINOMAS OF LIVER: ICD-10-CM

## 2024-10-17 DIAGNOSIS — C18.9 METASTATIC COLON CANCER TO LIVER (MULTI): ICD-10-CM

## 2024-10-17 DIAGNOSIS — C78.7 METASTATIC COLON CANCER TO LIVER (MULTI): ICD-10-CM

## 2024-10-17 PROCEDURE — 76705 ECHO EXAM OF ABDOMEN: CPT

## 2024-10-18 ENCOUNTER — TELEPHONE (OUTPATIENT)
Dept: HEMATOLOGY/ONCOLOGY | Facility: CLINIC | Age: 69
End: 2024-10-18
Payer: COMMERCIAL

## 2024-10-18 ENCOUNTER — TELEPHONE (OUTPATIENT)
Dept: HEMATOLOGY/ONCOLOGY | Facility: CLINIC | Age: 69
End: 2024-10-18

## 2024-10-18 ENCOUNTER — APPOINTMENT (OUTPATIENT)
Dept: RADIOLOGY | Facility: HOSPITAL | Age: 69
DRG: 375 | End: 2024-10-18
Payer: COMMERCIAL

## 2024-10-18 ENCOUNTER — TELEPHONE (OUTPATIENT)
Dept: HEMATOLOGY/ONCOLOGY | Facility: HOSPITAL | Age: 69
End: 2024-10-18
Payer: COMMERCIAL

## 2024-10-18 ENCOUNTER — HOSPITAL ENCOUNTER (INPATIENT)
Facility: HOSPITAL | Age: 69
LOS: 2 days | Discharge: HOME | DRG: 375 | End: 2024-10-20
Attending: EMERGENCY MEDICINE | Admitting: INTERNAL MEDICINE
Payer: COMMERCIAL

## 2024-10-18 DIAGNOSIS — R10.11 RIGHT UPPER QUADRANT ABDOMINAL PAIN: Primary | ICD-10-CM

## 2024-10-18 DIAGNOSIS — F41.1 ANXIETY ASSOCIATED WITH CANCER DIAGNOSIS (MULTI): ICD-10-CM

## 2024-10-18 DIAGNOSIS — C80.1 ANXIETY ASSOCIATED WITH CANCER DIAGNOSIS (MULTI): ICD-10-CM

## 2024-10-18 LAB
ALBUMIN SERPL BCP-MCNC: 3.8 G/DL (ref 3.4–5)
ALP SERPL-CCNC: 525 U/L (ref 33–136)
ALT SERPL W P-5'-P-CCNC: 18 U/L (ref 7–45)
ANION GAP SERPL CALC-SCNC: 16 MMOL/L (ref 10–20)
APPEARANCE UR: CLEAR
AST SERPL W P-5'-P-CCNC: 61 U/L (ref 9–39)
BASOPHILS # BLD AUTO: 0.03 X10*3/UL (ref 0–0.1)
BASOPHILS NFR BLD AUTO: 0.3 %
BILIRUB SERPL-MCNC: 0.6 MG/DL (ref 0–1.2)
BILIRUB UR STRIP.AUTO-MCNC: NEGATIVE MG/DL
BUN SERPL-MCNC: 7 MG/DL (ref 6–23)
CALCIUM SERPL-MCNC: 9.5 MG/DL (ref 8.6–10.6)
CHLORIDE SERPL-SCNC: 94 MMOL/L (ref 98–107)
CHLORIDE UR-SCNC: <15 MMOL/L
CHLORIDE/CREATININE (MMOL/G) IN URINE: NORMAL
CO2 SERPL-SCNC: 25 MMOL/L (ref 21–32)
COLOR UR: ABNORMAL
CREAT SERPL-MCNC: 0.76 MG/DL (ref 0.5–1.05)
CREAT UR-MCNC: 29.3 MG/DL (ref 20–320)
EGFRCR SERPLBLD CKD-EPI 2021: 86 ML/MIN/1.73M*2
EOSINOPHIL # BLD AUTO: 0.02 X10*3/UL (ref 0–0.7)
EOSINOPHIL NFR BLD AUTO: 0.2 %
ERYTHROCYTE [DISTWIDTH] IN BLOOD BY AUTOMATED COUNT: 20.6 % (ref 11.5–14.5)
GLUCOSE SERPL-MCNC: 107 MG/DL (ref 74–99)
GLUCOSE UR STRIP.AUTO-MCNC: NORMAL MG/DL
HCT VFR BLD AUTO: 32 % (ref 36–46)
HGB BLD-MCNC: 9.8 G/DL (ref 12–16)
IMM GRANULOCYTES # BLD AUTO: 0.04 X10*3/UL (ref 0–0.7)
IMM GRANULOCYTES NFR BLD AUTO: 0.4 % (ref 0–0.9)
KETONES UR STRIP.AUTO-MCNC: NEGATIVE MG/DL
LEUKOCYTE ESTERASE UR QL STRIP.AUTO: NEGATIVE
LIPASE SERPL-CCNC: 46 U/L (ref 9–82)
LYMPHOCYTES # BLD AUTO: 0.74 X10*3/UL (ref 1.2–4.8)
LYMPHOCYTES NFR BLD AUTO: 8.2 %
MAGNESIUM SERPL-MCNC: 1.5 MG/DL (ref 1.6–2.4)
MCH RBC QN AUTO: 28.5 PG (ref 26–34)
MCHC RBC AUTO-ENTMCNC: 30.6 G/DL (ref 32–36)
MCV RBC AUTO: 93 FL (ref 80–100)
MONOCYTES # BLD AUTO: 0.92 X10*3/UL (ref 0.1–1)
MONOCYTES NFR BLD AUTO: 10.2 %
NEUTROPHILS # BLD AUTO: 7.26 X10*3/UL (ref 1.2–7.7)
NEUTROPHILS NFR BLD AUTO: 80.7 %
NITRITE UR QL STRIP.AUTO: NEGATIVE
NRBC BLD-RTO: 0 /100 WBCS (ref 0–0)
OSMOLALITY SERPL: 270 MOSM/KG (ref 280–300)
OSMOLALITY UR: 82 MOSM/KG (ref 200–1200)
OVALOCYTES BLD QL SMEAR: NORMAL
PH UR STRIP.AUTO: 6 [PH]
PLATELET # BLD AUTO: 242 X10*3/UL (ref 150–450)
POTASSIUM SERPL-SCNC: 3.7 MMOL/L (ref 3.5–5.3)
POTASSIUM UR-SCNC: 7 MMOL/L
POTASSIUM/CREAT UR-RTO: 24 MMOL/G CREAT
PROT SERPL-MCNC: 7.8 G/DL (ref 6.4–8.2)
PROT UR STRIP.AUTO-MCNC: NEGATIVE MG/DL
RBC # BLD AUTO: 3.44 X10*6/UL (ref 4–5.2)
RBC # UR STRIP.AUTO: NEGATIVE /UL
RBC MORPH BLD: NORMAL
SCHISTOCYTES BLD QL SMEAR: NORMAL
SODIUM SERPL-SCNC: 131 MMOL/L (ref 136–145)
SODIUM UR-SCNC: 12 MMOL/L
SODIUM/CREAT UR-RTO: 41 MMOL/G CREAT
SP GR UR STRIP.AUTO: 1
UROBILINOGEN UR STRIP.AUTO-MCNC: NORMAL MG/DL
WBC # BLD AUTO: 9 X10*3/UL (ref 4.4–11.3)

## 2024-10-18 PROCEDURE — 81003 URINALYSIS AUTO W/O SCOPE: CPT

## 2024-10-18 PROCEDURE — 85025 COMPLETE CBC W/AUTO DIFF WBC: CPT

## 2024-10-18 PROCEDURE — 84075 ASSAY ALKALINE PHOSPHATASE: CPT

## 2024-10-18 PROCEDURE — 1100000001 HC PRIVATE ROOM DAILY

## 2024-10-18 PROCEDURE — 96375 TX/PRO/DX INJ NEW DRUG ADDON: CPT

## 2024-10-18 PROCEDURE — 99285 EMERGENCY DEPT VISIT HI MDM: CPT | Mod: 25

## 2024-10-18 PROCEDURE — 96365 THER/PROPH/DIAG IV INF INIT: CPT

## 2024-10-18 PROCEDURE — 2500000001 HC RX 250 WO HCPCS SELF ADMINISTERED DRUGS (ALT 637 FOR MEDICARE OP)

## 2024-10-18 PROCEDURE — 82436 ASSAY OF URINE CHLORIDE: CPT

## 2024-10-18 PROCEDURE — 83690 ASSAY OF LIPASE: CPT

## 2024-10-18 PROCEDURE — 99285 EMERGENCY DEPT VISIT HI MDM: CPT | Performed by: EMERGENCY MEDICINE

## 2024-10-18 PROCEDURE — 74177 CT ABD & PELVIS W/CONTRAST: CPT | Performed by: RADIOLOGY

## 2024-10-18 PROCEDURE — 74177 CT ABD & PELVIS W/CONTRAST: CPT

## 2024-10-18 PROCEDURE — 2500000004 HC RX 250 GENERAL PHARMACY W/ HCPCS (ALT 636 FOR OP/ED): Mod: SE

## 2024-10-18 PROCEDURE — 83735 ASSAY OF MAGNESIUM: CPT

## 2024-10-18 PROCEDURE — 2500000002 HC RX 250 W HCPCS SELF ADMINISTERED DRUGS (ALT 637 FOR MEDICARE OP, ALT 636 FOR OP/ED)

## 2024-10-18 PROCEDURE — 83930 ASSAY OF BLOOD OSMOLALITY: CPT

## 2024-10-18 PROCEDURE — 96366 THER/PROPH/DIAG IV INF ADDON: CPT

## 2024-10-18 PROCEDURE — 2550000001 HC RX 255 CONTRASTS: Mod: SE | Performed by: EMERGENCY MEDICINE

## 2024-10-18 PROCEDURE — 83935 ASSAY OF URINE OSMOLALITY: CPT

## 2024-10-18 PROCEDURE — 2500000004 HC RX 250 GENERAL PHARMACY W/ HCPCS (ALT 636 FOR OP/ED)

## 2024-10-18 PROCEDURE — 99223 1ST HOSP IP/OBS HIGH 75: CPT

## 2024-10-18 RX ORDER — PANTOPRAZOLE SODIUM 20 MG/1
20 TABLET, DELAYED RELEASE ORAL
Status: DISCONTINUED | OUTPATIENT
Start: 2024-10-19 | End: 2024-10-20 | Stop reason: HOSPADM

## 2024-10-18 RX ORDER — MAGNESIUM SULFATE HEPTAHYDRATE 40 MG/ML
2 INJECTION, SOLUTION INTRAVENOUS ONCE
Status: COMPLETED | OUTPATIENT
Start: 2024-10-18 | End: 2024-10-18

## 2024-10-18 RX ORDER — LORAZEPAM 0.5 MG/1
0.5 TABLET ORAL EVERY 6 HOURS PRN
Status: DISCONTINUED | OUTPATIENT
Start: 2024-10-18 | End: 2024-10-20 | Stop reason: HOSPADM

## 2024-10-18 RX ORDER — PROCHLORPERAZINE MALEATE 10 MG
10 TABLET ORAL EVERY 6 HOURS PRN
Status: CANCELLED | OUTPATIENT
Start: 2024-10-18

## 2024-10-18 RX ORDER — MAGNESIUM SULFATE HEPTAHYDRATE 40 MG/ML
2 INJECTION, SOLUTION INTRAVENOUS ONCE
Status: COMPLETED | OUTPATIENT
Start: 2024-10-18 | End: 2024-10-19

## 2024-10-18 RX ORDER — ONDANSETRON 4 MG/1
8 TABLET, FILM COATED ORAL EVERY 8 HOURS PRN
Status: CANCELLED | OUTPATIENT
Start: 2024-10-18

## 2024-10-18 RX ORDER — OXYCODONE HYDROCHLORIDE 5 MG/1
15 TABLET ORAL EVERY 6 HOURS PRN
Status: DISCONTINUED | OUTPATIENT
Start: 2024-10-18 | End: 2024-10-20 | Stop reason: HOSPADM

## 2024-10-18 RX ORDER — LETROZOLE 2.5 MG/1
2.5 TABLET, FILM COATED ORAL DAILY
Status: CANCELLED | OUTPATIENT
Start: 2024-10-19

## 2024-10-18 RX ORDER — ONDANSETRON HYDROCHLORIDE 2 MG/ML
4 INJECTION, SOLUTION INTRAVENOUS ONCE
Status: COMPLETED | OUTPATIENT
Start: 2024-10-18 | End: 2024-10-18

## 2024-10-18 RX ORDER — OLANZAPINE 5 MG/1
5 TABLET ORAL NIGHTLY
Status: DISCONTINUED | OUTPATIENT
Start: 2024-10-18 | End: 2024-10-20 | Stop reason: HOSPADM

## 2024-10-18 RX ORDER — CYANOCOBALAMIN (VITAMIN B-12) 500 MCG
400 TABLET ORAL DAILY
Status: DISCONTINUED | OUTPATIENT
Start: 2024-10-19 | End: 2024-10-20 | Stop reason: HOSPADM

## 2024-10-18 RX ORDER — MORPHINE SULFATE 4 MG/ML
4 INJECTION INTRAVENOUS ONCE
Status: COMPLETED | OUTPATIENT
Start: 2024-10-18 | End: 2024-10-18

## 2024-10-18 RX ORDER — POLYETHYLENE GLYCOL 3350 17 G/17G
17 POWDER, FOR SOLUTION ORAL DAILY
Status: DISCONTINUED | OUTPATIENT
Start: 2024-10-18 | End: 2024-10-20 | Stop reason: HOSPADM

## 2024-10-18 RX ORDER — ALLOPURINOL 100 MG/1
200 TABLET ORAL 2 TIMES DAILY
Status: CANCELLED | OUTPATIENT
Start: 2024-10-18

## 2024-10-18 RX ORDER — HYDROMORPHONE HYDROCHLORIDE 1 MG/ML
0.2 INJECTION, SOLUTION INTRAMUSCULAR; INTRAVENOUS; SUBCUTANEOUS
Status: DISCONTINUED | OUTPATIENT
Start: 2024-10-18 | End: 2024-10-20 | Stop reason: HOSPADM

## 2024-10-18 RX ORDER — HALOPERIDOL 5 MG/ML
2.5 INJECTION INTRAMUSCULAR ONCE
Status: DISCONTINUED | OUTPATIENT
Start: 2024-10-18 | End: 2024-10-18

## 2024-10-18 RX ORDER — ENOXAPARIN SODIUM 100 MG/ML
40 INJECTION SUBCUTANEOUS EVERY 24 HOURS
Status: DISCONTINUED | OUTPATIENT
Start: 2024-10-18 | End: 2024-10-20 | Stop reason: HOSPADM

## 2024-10-18 RX ADMIN — MAGNESIUM SULFATE HEPTAHYDRATE 2 G: 40 INJECTION, SOLUTION INTRAVENOUS at 23:03

## 2024-10-18 RX ADMIN — POLYETHYLENE GLYCOL 3350 17 G: 17 POWDER, FOR SOLUTION ORAL at 21:19

## 2024-10-18 RX ADMIN — OLANZAPINE 5 MG: 5 TABLET, FILM COATED ORAL at 20:44

## 2024-10-18 RX ADMIN — MORPHINE SULFATE 4 MG: 4 INJECTION INTRAVENOUS at 15:36

## 2024-10-18 RX ADMIN — ENOXAPARIN SODIUM 40 MG: 100 INJECTION SUBCUTANEOUS at 20:44

## 2024-10-18 RX ADMIN — ONDANSETRON 4 MG: 2 INJECTION INTRAMUSCULAR; INTRAVENOUS at 12:20

## 2024-10-18 RX ADMIN — LORAZEPAM 0.5 MG: 0.5 TABLET ORAL at 20:44

## 2024-10-18 RX ADMIN — MAGNESIUM SULFATE HEPTAHYDRATE 2 G: 40 INJECTION, SOLUTION INTRAVENOUS at 15:36

## 2024-10-18 RX ADMIN — OXYCODONE HYDROCHLORIDE 15 MG: 5 TABLET ORAL at 20:44

## 2024-10-18 RX ADMIN — IOHEXOL 75 ML: 350 INJECTION, SOLUTION INTRAVENOUS at 12:50

## 2024-10-18 ASSESSMENT — LIFESTYLE VARIABLES
EVER FELT BAD OR GUILTY ABOUT YOUR DRINKING: NO
TOTAL SCORE: 0
EVER HAD A DRINK FIRST THING IN THE MORNING TO STEADY YOUR NERVES TO GET RID OF A HANGOVER: NO
HAVE PEOPLE ANNOYED YOU BY CRITICIZING YOUR DRINKING: NO
HAVE YOU EVER FELT YOU SHOULD CUT DOWN ON YOUR DRINKING: NO

## 2024-10-18 ASSESSMENT — PAIN SCALES - GENERAL
PAINLEVEL_OUTOF10: 3
PAINLEVEL_OUTOF10: 8
PAINLEVEL_OUTOF10: 7
PAINLEVEL_OUTOF10: 8

## 2024-10-18 ASSESSMENT — PAIN DESCRIPTION - LOCATION
LOCATION: ABDOMEN
LOCATION: ABDOMEN

## 2024-10-18 ASSESSMENT — PAIN - FUNCTIONAL ASSESSMENT
PAIN_FUNCTIONAL_ASSESSMENT: 0-10
PAIN_FUNCTIONAL_ASSESSMENT: 0-10

## 2024-10-18 ASSESSMENT — PAIN DESCRIPTION - PAIN TYPE: TYPE: ACUTE PAIN

## 2024-10-18 NOTE — CONSULTS
[facility]  DEPARTMENT OF SURGERY - Oncology    CONSULTATION NOTE      CHIEF COMPLAINT:  Imaging c/f cholecystitis    HISTORY OF PRESENT ILLNESS:  69-year-old female with a history of colon cancer metastasized to the liver.  Presenting today with abdominal pain and imaging concerning for cholecystitis due to visualized thickening of the gallbladder.    Onc History:   R breast cancer (8/20/20, ER+/NE+/HER2 1+ by IHC/AMARIS+) s/p R lumpectomy and R SLN (11/5/2020) followed by carboplatin/Taxotere/Herceptin x4C (stopped due to SE) and R RT (completed 7/2021) on letrozole (since 7/2021), and stage IIA colon cancer s/p laparoscopic sigmoid colectomy and colostomy and en bloc resection with appendectomy and L ureterolysis (7/6/2020) with subsequent pelvic RT (completed 10/15/2020) lost to follow-up found to have recurrent metastatic colon cancer to the liver.  Status post 5 cycles of FOLFOX (most recently completed approximately 1 month ago).    Presents today with longstanding right upper quadrant pain that she describes has been going on since May of this year.  This is around the time when she found out that she also had colon cancer mets to her liver.  Which were noted by her PCP from elevated liver enzymes.  She described episodes of nausea, vomiting, diarrhea for approximately past 3 weeks.  She also says that she has intermittent episodes of pain and chills and fevers.  She has been able to eat during this time, which does not worsen her symptoms. Most recently she had an ultrasound done on 10/17 which indicated some thickening of her gallbladder.  Her oncologist at this time asked her to go to the ED.  Upon reviewing prior imaging similar thickening and possible indication of gallstone noted on a CT scan from last month.  CT scan today in the ED showed similar thickening and stability.          PAST MEDICAL HISTORY:  Past Medical History:   Diagnosis Date    Breast cancer (Multi)      2020 right breast    Colon cancer (Multi)     2020    GERD (gastroesophageal reflux disease)     HLD (hyperlipidemia)     HTN (hypertension)     Rheumatoid arthritis (Multi)        PAST SURGICAL HISTORY:  Past Surgical History:   Procedure Laterality Date    NEPHRECTOMY      1998, donated to sister    TOE SURGERY Left     3/2023    TOE SURGERY Right     4/2024       SOCIAL HISTORY:  Denies tobacco use  Denies EtOH  Denies illicits  Social History     Socioeconomic History    Marital status: Single   Tobacco Use    Smoking status: Former     Types: Cigarettes     Passive exposure: Current    Smokeless tobacco: Current   Vaping Use    Vaping status: Every Day    Substances: Nicotine    Devices: Disposable, Refillable tank   Substance and Sexual Activity    Alcohol use: Not Currently     Comment: seldom beer    Drug use: Never     Social Drivers of Health     Financial Resource Strain: High Risk (7/17/2024)    Received from [facility] SDOH Screening     In the past year, have you been unable to get any of the following when you really needed them? choose all that apply.: Internet   Food Insecurity: High Risk (7/17/2024)    Received from [facility] SDOH Screening     In the past 2 months, did you or others you live with eat smaller meals or skip meals because you didn't have money for food?: Yes    Received from [facility]    Received from [facility]       FAMILY HISTORY:  Denies bleeding or clotting disorders  Denies genetic disorders  Family History   Problem Relation Name Age of Onset    Other (mouth cancer) Sister         ALLERGIES:  Patient has no known allergies.    MEDICATIONS:  Scheduled medications  magnesium sulfate, 2 g, intravenous, Once      Continuous medications     PRN medications          REVIEW OF SYSTEMS:  14-pt ROS performed, negative unless otherwise indicated in HPI.    VITALS:  Temperature:  [36.1 °C (96.9  °F)] 36.1 °C (96.9 °F)  Heart Rate:  [80-90] 80  Respirations:  [16-18] 18  BP: (138-147)/() 143/100    EXAM:  Gen:  NAD, non-toxic appearing  Eyes:  No scleral icterus  Card:  RRR  Resp:  Non-labored breathing on RA  Abd:  Soft, non-distended, mildly tender to palpation right upper quadrant.  Negative Aj sign.  No rebound.  No overt signs of peritonitis.  Ext:  WWP, no swelling of BLE      LABS:  Results for orders placed or performed during the hospital encounter of 10/18/24 (from the past 24 hours)   Urinalysis with Reflex Culture and Microscopic   Result Value Ref Range    Color, Urine Light-Yellow Light-Yellow, Yellow, Dark-Yellow    Appearance, Urine Clear Clear    Specific Gravity, Urine 1.003 (N) 1.005 - 1.035    pH, Urine 6.0 5.0, 5.5, 6.0, 6.5, 7.0, 7.5, 8.0    Protein, Urine NEGATIVE NEGATIVE, 10 (TRACE), 20 (TRACE) mg/dL    Glucose, Urine Normal Normal mg/dL    Blood, Urine NEGATIVE NEGATIVE    Ketones, Urine NEGATIVE NEGATIVE mg/dL    Bilirubin, Urine NEGATIVE NEGATIVE    Urobilinogen, Urine Normal Normal mg/dL    Nitrite, Urine NEGATIVE NEGATIVE    Leukocyte Esterase, Urine NEGATIVE NEGATIVE   Urine electrolytes   Result Value Ref Range    Sodium, Urine Random 12 mmol/L    Sodium/Creatinine Ratio 41 Not established. mmol/g Creat    Potassium, Urine Random 7 mmol/L    Potassium/Creatinine Ratio 24 Not established mmol/g Creat    Chloride, Urine Random <15 mmol/L    Chloride/Creatinine Ratio      Creatinine, Urine Random 29.3 20.0 - 320.0 mg/dL   Osmolality, urine   Result Value Ref Range    Osmolality, Urine Random 82 (L) 200 - 1,200 mOsm/kg   CBC and Auto Differential   Result Value Ref Range    WBC 9.0 4.4 - 11.3 x10*3/uL    nRBC 0.0 0.0 - 0.0 /100 WBCs    RBC 3.44 (L) 4.00 - 5.20 x10*6/uL    Hemoglobin 9.8 (L) 12.0 - 16.0 g/dL    Hematocrit 32.0 (L) 36.0 - 46.0 %    MCV 93 80 - 100 fL    MCH 28.5 26.0 - 34.0 pg    MCHC 30.6 (L) 32.0 - 36.0 g/dL    RDW 20.6 (H) 11.5 - 14.5 %    Platelets 242  150 - 450 x10*3/uL    Neutrophils % 80.7 40.0 - 80.0 %    Immature Granulocytes %, Automated 0.4 0.0 - 0.9 %    Lymphocytes % 8.2 13.0 - 44.0 %    Monocytes % 10.2 2.0 - 10.0 %    Eosinophils % 0.2 0.0 - 6.0 %    Basophils % 0.3 0.0 - 2.0 %    Neutrophils Absolute 7.26 1.20 - 7.70 x10*3/uL    Immature Granulocytes Absolute, Automated 0.04 0.00 - 0.70 x10*3/uL    Lymphocytes Absolute 0.74 (L) 1.20 - 4.80 x10*3/uL    Monocytes Absolute 0.92 0.10 - 1.00 x10*3/uL    Eosinophils Absolute 0.02 0.00 - 0.70 x10*3/uL    Basophils Absolute 0.03 0.00 - 0.10 x10*3/uL   Comprehensive metabolic panel   Result Value Ref Range    Glucose 107 (H) 74 - 99 mg/dL    Sodium 131 (L) 136 - 145 mmol/L    Potassium 3.7 3.5 - 5.3 mmol/L    Chloride 94 (L) 98 - 107 mmol/L    Bicarbonate 25 21 - 32 mmol/L    Anion Gap 16 10 - 20 mmol/L    Urea Nitrogen 7 6 - 23 mg/dL    Creatinine 0.76 0.50 - 1.05 mg/dL    eGFR 86 >60 mL/min/1.73m*2    Calcium 9.5 8.6 - 10.6 mg/dL    Albumin 3.8 3.4 - 5.0 g/dL    Alkaline Phosphatase 525 (H) 33 - 136 U/L    Total Protein 7.8 6.4 - 8.2 g/dL    AST 61 (H) 9 - 39 U/L    Bilirubin, Total 0.6 0.0 - 1.2 mg/dL    ALT 18 7 - 45 U/L   Lipase   Result Value Ref Range    Lipase 46 9 - 82 U/L   Morphology   Result Value Ref Range    RBC Morphology See Below     RBC Fragments Few     Ovalocytes Few    Magnesium   Result Value Ref Range    Magnesium 1.50 (L) 1.60 - 2.40 mg/dL   Osmolality   Result Value Ref Range    Osmolality, Serum 270 (L) 280 - 300 mOsm/kg           IMAGING:  CT A/P: 10/18-imaging shows stable hepatic lesions from prior CT scan last month.  Reviewed demonstrates stable gallbladder thickening also seen on day prior's ultrasound and CT scan from last month.  Some evidence of a gallbladder stone.  Consistent postoperative anatomy.    RUQ US:  Ill-defined hyperechoic mass in the right hepatic lobe laterally  corresponds to the ill-defined hypodense mass on CT in this patient  with clinically known metastatic  colon cancer.      Diffusely thickened gallbladder wall is thin the setting of an  incompletely distended gallbladder. Cholecystitis is a possibility,  but edema from impaired circulation inter related to the large  hepatic mass is also possible.      No biliary ductal dilatation is noted.      ASSESSMENT:  69-year-old female with a history of colon cancer status post laparoscopic sigmoid colectomy and colostomy and en bloc resection with appendectomy and left utero lysis (2020) and ileostomy reversal in 2021.  History notable for right breast cancer status post lumpectomy as well as chemo and radiation.  Has received 5 cycles of FOLFOX for CRLM (most recently 1 month ago).  Presenting today with concern for cholecystitis due to imaging showing thickened gallbladder.  Imaging shows some edema around the gallbladder however this has been chronic and so has been the pain.  Unlikely to be acute cholecystitis at this time.      PLAN:  - Patient presents a bit of a mixed picture that is not completley consistent with cholecystitis. She has been able to eat without worsening of her pain and this process has been going on for months according to her. Her gallbladder is contracted on the US and both Cts that are available for review. She has a normal WBC and denies any fevers, but is immunosuppressed. At this point, feel her symptoms may just be secondary to her large hepatic tumor burden rather than acute cholecystitis. Would be ok holding off on abx from our standpoint unless clinically worsens  - No plan for cholecystectomy or percutaneous cholecystostomy tube at this time  - OK for diet from our standpoint  - Please contact surgical oncology for any concerns or questions while inpatient.  - On discharge, patient can follow-up with surgical oncology on an as needed basis      d/w Dr. Katie Barrera md/radha [email or web address]

## 2024-10-18 NOTE — H&P
INTERNAL MEDICINE  ADMISSION H&P     Subjective   HPI:  Elana Dodd is a 69 y.o. female with HTN, RA (on leflunomide), HLD, GERD, s/p R nephrectomy (1998, donated kidney to sister), R breast cancer on letrozole (since 7/2021), and colon cancer with recurrent metastatic disease to the liver. Present to the ED with RUQ pain, vomiting and diarrhea for 3 weeks.     ***    Cycle 5 was scheduled for 10/16/2024 but she missed her pre-chemo onc appointment for bloodwork. So, on C5D1 she had pre-chemo labs done which showed Mg 1.2 and . Sabi Tapia CNP decided to hold off on chemo, and repleted mag and iron. RUQ liver ultrasound done that day showed diffusely thickened gallbladder wall is thin the setting of an incompletely distended gallbladder. Cholecystitis is a possibility, so she was sent to the ED for evaluation.     ED Course:  Vitals:   T 36.1 °C (96.9 °F)  HR 90  /77  RR 16  O2 98 % None (Room air)    Relevant studies:  -CBC: HB 9.8 (baseline 9-10), unremarkable smear  -CMP: Na 131, Mag 1.5, Cr 0.8 (baseline)  -lipase neg  -UA negative for UTI, spec grav 1.003    CT abd pelvis with IV contrast:  IMPRESSION:  1.  There is gallbladder wall thickening with associated  pericholecystic fluid, and increasing gallbladder wall edema. These  findings are felt to reflect inflammation and infiltration by the  adjacent metastatic disease within the liver. The known metastatic  disease has slightly worsened since the prior image as indicated by  increase in size of multiple small hypodense lesions, increase in  liver size, and increased perihepatic fluid, which may explain  patient's worsening right upper quadrant pain.  2. Stable enlarged portacaval lymph nodes reflecting patient's known  metastatic colon cancer.  3. Additional chronic findings as described above.    Interventions:  -Mag 2g IV  -Morphine 4mg IV  -Zofran 4mg IV        Medical History:  PMH: above    Oncologic History:  Diagnosis:  recurrent colon adenocarcinoma with metastatic disease to the liver   MMR: proficient  NGS: Caris: BRYANT, mutations in DACH1, SETD2, TP53, APC  Tempus xF: pending   CEA: 44.2 (6/3/24), 67.8 (7/31/24)  CA 19-9: 178 (6/3/24)     Ms. Elana Dodd is a 70 yo F with PMH of HTN, RA (on leflunomide), HLD, GERD, s/p R nephrectomy (1998, donated kidney to sister), R breast cancer (8/20/20, ER+/GA+/HER2 1+ by IHC/AMARIS+) s/p R lumpectomy and R SLN (11/5/2020) followed by carboplatin/Taxotere/Herceptin x4C (stopped due to SE) and R RT (completed 7/2021) on letrozole (since 7/2021), and stage IIA colon cancer s/p laparoscopic sigmoid colectomy and colostomy and en bloc resection with appendectomy and L ureterolysis (7/6/2020) with subsequent pelvic RT (completed 10/15/2020) lost to follow-up found to have recurrent metastatic colon cancer to the liver.     She was previously diagnosed and treated for her breast and colon cancers in [place].      6/23/2020 - PET/CT - hypermetabolic mass in sigmoid colon & hypermet mass in R breast      7/6/2020 - sigmoid colon resection - pathology: Moderately differentiated invasive adenocarcinoma, 5.5 cm in diameter. Invades through muscularis propria focally into subserosal tissue. Marked adhesion of small bowel segments to colon without involvement of malignancy, +0/28 LN, negative margins, small focus of carcinoma in the serosa of margin furthest from this tumor (pT3 N0), at least stage IIA     8/12/2020 - BL mammogram & US: 3.6 cm mass in R breast     8/20/2020 - R breast biopsy - path: Invasive poorly differentiated ductal carcinoma. ER positive and GA positive and HER-2 by IHC is 1+, but by HER-2 AMARIS is positive. On MammaPrint testing malignancy is ER and GA positive and HER-2 positive, high risk, with a predicted 5-year metastasis free survival of 93% with chemotherapy and hormonal therapy versus 71% metastasis free survival with hormonal therapy alone.      10/15/2020 - Completed pelvic  radiation      11/5/2020 - R lumpectomy and R SLNBx - path: 2.6 cm invasive ductal carcinoma, with all margins free of malignancy. The malignancy comes to within 1 mm of the closest deep margin. Perineural invasion is present. There is a focus of metastatic carcinoma, measuring 0.5 mm in 1 of 2 sentinel lymph nodes. pT2 pN1mic. Stage IIB       2021 - Repeat scans negative. Transitioned her care to her PCP where she was being prescribed letrozole. Has not had repeat mammogram or colonoscopy.     4/10/24 - Re-established care with medical oncology with Dr. Riccardo Blake at [facility]. Had not been seen by medical oncology since August 2021. Letrozole had been refilled by PCP. CT CAP, colonoscopy, CEA, CA 19-9, CA 27.29 were all ordered.      6/18/24 - CT A/P w contrast - large hepatic mass (7.5x8.7cm) involving both anterior segment of R lobe as well as medial segment of L lobe likely related to metastatic disease possibly colon cancer however met breast CA not excluded w hx; enlarged mohit hepatic LN likely metastatic, probably stable sidebranch IPMN and in pancreas   CT chest non con - stable tiny nodule superolateral LLL (3mm)     6/24/24 - Dr. Blake discussed imaging. Biopsy ordered.      7/1/24 - CT guided biopsy of liver lesion, path showed adenocarcinoma, metastatic from patient's known colonic primary, IHC +CK20, CDX2, SATB2 while negative for GATA3 and CK7, pMMR      Current Treatment:  7/31/24 - present: FOLFOX + panitumumab (panitumumab added starting C3), Cycle 5 scheduled for 10/16/2024 but she missed her pre-chemo onc appointment. On C5D1 she had labs done which showed MG 1.2 and . Sabi Tapia CNP decided to hold off on chemo, and replete mag and iron. RUQ liver ultrasound showed diffusely thickened gallbladder wall is thin the setting of an incompletely distended gallbladder. Cholecystitis is a possibility, so she was sent to the ED for evaluation.       Medications prior to admission: reviewed  ***  Current Outpatient Medications   Medication Instructions    allopurinol (ZYLOPRIM) 300 mg, 2 times daily    amLODIPine (Norvasc) 5 mg tablet 1 tablet, Daily    atorvastatin (Lipitor) 20 mg tablet 1 tablet, Daily    cholecalciferol (Vitamin D-3) 25 MCG (1000 UT) capsule 1 capsule, Daily    clindamycin (Cleocin T) 1 % lotion Apply topically to affected area twice daily.    doxepin (SINEQUAN) 10 mg, oral, Nightly PRN    hydrocortisone 2.5 % cream Topical, 2 times daily, To area of rash    leflunomide (Arava) 10 mg tablet Daily RT    letrozole (FEMARA) 2.5 mg, Daily    LORazepam (ATIVAN) 0.5 mg, oral, Every 8 hours PRN    minocycline 100 mg, oral, Every 24 hours, For rash prevention    OLANZapine (ZYPREXA) 5 mg, oral, Nightly, 1 tab at bedtime for 3 days, starting the night of chemo    omeprazole (PRILOSEC) 40 mg    ondansetron (ZOFRAN) 8 mg, oral, Every 8 hours PRN    oxyCODONE (ROXICODONE) 15 mg, oral, Every 6 hours PRN, G89.3    prochlorperazine (COMPAZINE) 10 mg, oral, Every 6 hours PRN       Allergies: ***  No Known Allergies    PSH: ***  Past Surgical History:   Procedure Laterality Date    NEPHRECTOMY      1998, donated to sister    TOE SURGERY Left     3/2023    TOE SURGERY Right     4/2024       FamHx:  Family History   Problem Relation Name Age of Onset    Other (mouth cancer) Sister         SocHx:  Home: lives ***  Functional status: independent in ADLs and iADLs ***  Education/work: ***  Sexual activity: ***  Substance use:   -Alcohol: ***  -Tobacco: ***  -Recreational drugs: denies***    ROS: 10-point ROS negative unless otherwise mentioned in HPI            Objective   Vitals: reviewed  Exam:  Gen: well-appearing, NAD  Head and neck: NCAT, neck supple without LAD  HEENT: MMM, normal nose without congestion  CV: RRR no MRG, radial pulses 2+  Pulm: CTAB, no wheezing or crackles, normal WOB on RA  Abd: soft, non-tender, non-distended  Ext: WWP, no LE edema  Neuro: alert and oriented x3, normal tone, face  symmetric, moves all extremities spontaneously  ***         Assessment/Plan   69 y.o. female with HTN, RA (on leflunomide), HLD, GERD, s/p R nephrectomy (1998, donated kidney to sister), R breast cancer on letrozole (since 7/2021), and colon cancer with recurrent metastatic disease to the liver, who presented to the ED for RUQ pain and ultrasound concerning for acute cholecystitis. CT a/p more consistent with worsening metastatic disease of the liver causing the pain. Admitted to [facility] oncology service.     #RUQ pain - cancer related pain  #Dehydration? ***  #Adenocarcinoma of the colon with liver mets  -mFOLFOX6 Cycle 5 initially scheduled for 10/16 but delayed for hypomagnesemia and RUQ pain  -Email primary oncologist Dr. Felipa Hu in the morning  -Pain/comfort plan per supportive onc:  -Home regimen: oxycodone 15mg q6hrs PRN  -While in house: morphine 4mg q4 prn severe pain***  -continue zofran 8mg q8hrs PRN first line, and compazine 10mg q6hrs PRN second line   -bowel regimen: colace 100mg bid, senna 8.6mg BID, milk of magnesia 15ml QID prn  -ativan 0.5mg tid PRN for anxiety  -doxepin 10mg at bedtime for insomnia   -supportive onc consult    #Drug induced rash - panitumumab   -Minocycline 100mg daily for rash prevention (patient not taking)  -Hydrocortisone cream and clindamycin lotion as needed on rash.      #R breast cancer   -on letrozole***      #RA   -hold home leflunomide as it can cause hepatotoxicity especially with dehydration         Diet: ***  DVT PPx: ***  CODE STATUS: DNR and No Intubation (confirmed with *** on admission)  Surrogate decision maker: Jose Carlos Shelton (daughter) and Darya Leal (daughter)     Jonathan Hernández MD  Med-Peds PGY-4

## 2024-10-18 NOTE — TELEPHONE ENCOUNTER
"On 10/16/24 Elana Olmos presented to infusion for C5 FOLFOX. She reported to her nurse being \"really cold and shivering and feeling crappy to then \"breaking a fever and sweating and very hot. And then she gets some diarrhea and vomiting\" She never took her temperature during any of those episodes.  The last few days she finally was feeling better. After getting her lab work back with the elevated Alk Phose and neutrophil level, I held her chemotherapy and ordered a stat ultrasound of her liver which was done the next day.  On 10/17/24, after receiving the ultrasound results ->   Ill-defined hyperechoic mass in the right hepatic lobe laterally  corresponds to the ill-defined hypodense mass on CT in this patient  with clinically known metastatic colon cancer.      Diffusely thickened gallbladder wall is thin the setting of an  incompletely distended gallbladder. Cholecystitis is a possibility,  but edema from impaired circulation inter related to the large  hepatic mass is also possible.      No biliary ductal dilatation is noted    I paged Dr. Rebecca Hwang who was covering for Dr. Hu. He wanted the patient to go to the ER for an evaluation for possible cholecystitis. If she couldn't go then start Cipro 500 mg Q12 and metronidazole 500 mg Q8 hrs and refer to surgery for evaluation.    I called Elana Olmos who was receptive to go to [facility] ER ([facility]) to be evaluated but not until Friday morning. I stressed the importance of going and if she decides to not go to please call the office to let us know.     In talking to Elana Olmos she mentioned her dtr wants her to do all of this holistic medicines and put her on 2 vitamins' which she started a few weeks ago. I instructed her that these may be interfering with her chemotherapy and she needs to tell us the names of everything she is taking so we can look them up and see if there is any interaction. I would recommend she stop everything until she sees us. She can bring them " in so we can have our pharmD check any interactions.

## 2024-10-18 NOTE — PROGRESS NOTES
Pharmacy Medication History Review    Elana Dodd is a 69 y.o. female admitted for Right upper quadrant abdominal pain. Pharmacy reviewed the patient's [id number] medications and allergies for accuracy.    Medications ADDED:  None   Medications CHANGED:  None   Medications REMOVED:   Ibuprofen 800 mg    The list below reflects the updated PTA list. Comments regarding how patient may be taking medications differently can be found in the Admit Orders Activity  Prior to Admission Medications   Prescriptions Last Dose Informant   LORazepam (Ativan) 0.5 mg tablet 10/18/2024 Morning Self, Other   Sig: Take 1 tablet (0.5 mg) by mouth every 6 hours if needed for anxiety.   OLANZapine (ZyPREXA) 5 mg tablet  Self, Other   Sig: Take 1 tablet (5 mg) by mouth once daily at bedtime. 1 tab at bedtime for 3 days, starting the night of chemo   allopurinol (Zyloprim) 100 mg tablet 10/18/2024 Morning Self, Other   Sig: Take 2 tablets (200 mg) by mouth 2 times a day.   amLODIPine (Norvasc) 5 mg tablet 10/18/2024 Morning Self, Other   Sig: Take 1 tablet (5 mg) by mouth once daily.   atorvastatin (Lipitor) 20 mg tablet 10/18/2024 Morning Self, Other   Sig: Take 1 tablet (20 mg) by mouth once daily.   cholecalciferol (Vitamin D-3) 25 MCG (1000 UT) capsule 10/18/2024 Morning Self, Other   Sig: Take 1 capsule (25 mcg) by mouth once daily.   clindamycin (Cleocin T) 1 % lotion 10/18/2024 Morning Self, Other   Sig: Apply topically to affected area twice daily.   doxepin (SINEquan) 10 mg capsule Not Taking Self, Other   Sig: Take 1 capsule (10 mg) by mouth as needed at bedtime for sleep.   Patient not taking: Reported on 10/18/2024   hydrocortisone 2.5 % cream Not Taking Self, Other   Sig: Apply topically 2 times a day. To area of rash   Patient not taking: Reported on 10/18/2024   leflunomide (Arava) 10 mg tablet 10/18/2024 Morning Self, Other   Sig: Take 1 tablet (10 mg) by mouth once daily.   letrozole (Femara) 2.5 mg tablet  10/18/2024 Morning Self, Other   Sig: Take 1 tablet (2.5 mg total) by mouth once daily.  Take with or without food.   minocycline 100 mg capsule Not Taking Self, Other   Sig: Take 1 capsule (100 mg) by mouth once every 24 hours. For rash prevention   Patient not taking: Reported on 10/18/2024   omeprazole (PriLOSEC) 40 mg DR capsule 10/18/2024 Morning Self, Other   Sig: Take 1 capsule (40 mg) by mouth once daily. Do not crush or chew.   ondansetron (Zofran) 8 mg tablet  Self, Other   Sig: Take 1 tablet (8 mg) by mouth every 8 hours if needed for nausea or vomiting.   oxyCODONE (Roxicodone) 15 mg immediate release tablet Past Week Self, Other   Sig: Take 1 tablet (15 mg) by mouth every 6 hours if needed (pain). G89.3   prochlorperazine (Compazine) 10 mg tablet  Self, Other   Sig: Take 1 tablet (10 mg) by mouth every 6 hours if needed for nausea or vomiting.      Facility-Administered Medications: None        The list below reflects the updated allergy list. Please review each documented allergy for additional clarification and justification.  Allergies  Reviewed by Jeannie Andres on 10/18/2024   No Known Allergies         Patient accepts M2B at discharge. Pharmacy has been updated to [facility].    Sources used to complete the med history include:  OARRS  Interview with Patient (Good Historian)  Called [facility] (#[phone]) to verify patient fill history  Care Everywhere reviewed    Below are additional concerns with the patient's PTA list:  Per 10/1/24 [facility] office visit, medication list included lexapro 10 mg daily, loperamide 2 mg capsule, and trazodone 50 mg nightly that were not mentioned by the patient or [facility] pharmacy.    Jeannie Andres [facility]  Transitions of Care Technician  [facility]  Please reach out via Secure Chat for questions, or if no response call [facility] or [facility]era [facility]

## 2024-10-18 NOTE — ED PROVIDER NOTES
History of Present Illness     History provided by: Patient  Limitations to History: None  External Records Reviewed with Brief Summary: Radiology report from right upper quadrant ultrasound on 10/16/24 demonstrates diffusely second gallbladder wall concerning for cholecystitis versus edema from impaired circulation related to large hepatic mass. Patient also had labs drawn on 10/16/24 notable for alkaline phosphatase of 578, AST of 62, magnesium 1.21, WBC of 10.2, absolute neutrophils 8.28.    HPI:  Elana Dodd is a 69 y.o. female with: cancer with mets to the liver currently on chemotherapy presenting for concern of cholecystitis. Patient notes she has had vomiting and diarrhea for approximately three weeks. She had ultrasound of right upper quadrant and labs completed on 10/16/24 as noted above. Patient also notes that she intermittently has chills or feeling feverish. She is reporting some pain in her right upper quadrant, but also notes she is sore from the ultrasound.    Physical Exam   Triage vitals:  T 36.1 °C (96.9 °F)  HR 90  /77  RR 16  O2 98 % None (Room air)    General: Awake, alert, in no acute distress  Eyes: Gaze conjugate.  No scleral icterus or injection, EOMI  HENT: Normo-cephalic, atraumatic. No stridor  CV: Regular rate, regular rhythm. Radial pulses 2+ bilaterally, DP pulses 2+ bilaterally  Resp: Breathing non-labored, speaking in full sentences.  Clear to auscultation bilaterally, no wheezing, rales, rhonchi  GI: Soft, non-distended, mild tenderness to palpation of RUQ, no peritoneal signs  MSK/Extremities: No gross bony deformities. Moving all extremities  Skin: Warm. Appropriate color  Neuro: Alert. Oriented. Face symmetric. Speech is fluent.  Gross strength and sensation intact in b/l UE and LEs  Psych: Appropriate mood and affect    Medical Decision Making & ED Course   Medical Decision Makin y.o. female presenting for abnormal labs and ultrasound findings. She is  hemodynamically stable based on triage vitals, afebrile, not tachycardic. On physical exam, she does not appear in acute distress. She is mildly tender to palpation of the right upper quadrant, but she notes she is also sore after right upper quadrant ultrasound two days prior. Concern for cholecystitis, worsening metastasis, gastritis, acute cholangitis. Will repeat CMP, CBC to evaluate for any changes in alkaline phosphatase, leukocytosis, electrolyte abnormalities. Will also obtain CT abdomen pelvis to further evaluate the gallbladder and liver and assess for other intra-abdominal pathology.      ED Course:  ED Course as of 10/19/24 0914   Fri Oct 18, 2024   1201 ED Attending Attestation: 69F with a history of colon cancer follows with oncology, recently found she has liver mets.  Comes in today due to elevated LFTs.  Had outpatient RUQ gallbladder ultrasound that showed cholecystitis, no stones, tender to palpation in the right upper quadrant on my exam, no rebound or guarding, no peritoneal signs. Not septic or toxic appearing. Will repeat LFTs here.  Will discuss with [facility] oncology.  - Kyle Gil MD  ED Attending  [KF]   1337 Urinalysis with Reflex Culture and Microscopic(!)  No nitrites, leukocytes, blood noted on UA. Low concern for urinary tract infection. [AC]   1337 Alkaline Phosphatase(!): 525  Remains elevated, slightly decreased from yesterday's labs. [AC]   1340 HEMOGLOBIN(!): 9.8  Consistent with patient's baseline. [AC]   1626 Surgical oncology indicates the patient is not a candidate for current surgery.  We will admit to oncology here at the hospital for continued observation and management. [DS]   2301 The patient will be signed out to the night resident. [DS]      ED Course User Index  [AC] Mesfin Brown DO  [DS] Karlos López MD  [KF] Kyle Gil MD MPH         Diagnoses as of 10/19/24 0914   Right upper quadrant abdominal pain       EKG Independent Interpretation: EKG not  obtained      The patient was discussed with the following consultants/services:  Surgical Oncology regarding concern for cholecystitis in setting of   ----         Social Determinants of Health which Significantly Impact Care: None identified     Independent Result Review and Interpretation: Relevant laboratory and radiographic results were reviewed and independently interpreted by myself.  As necessary, they are commented on in the ED Course.    Chronic conditions affecting the patient's care: As documented above in MDM      Care Considerations: As documented above in MDM      Disposition   Patient was signed out to Dr. López at 15:00 pending completion of their work-up.  Please see the next provider's transition of care note for the remainder of the patient's care.     Procedures   Procedures        Mesfin Brown DO  Emergency Medicine PGY1     Mesfin Brown DO  Resident  10/19/24 0914

## 2024-10-18 NOTE — TELEPHONE ENCOUNTER
Called and spoke with patient. She is currently checking into the emergency department now. Patient was confused as to where she was supposed to go. Patient requested to call the office back later as she was busy getting checked in. Advised patient that she could call the office when her tests were completed and she had an update. Patient verbalized understanding using the teach back method.

## 2024-10-18 NOTE — ED TRIAGE NOTES
Pt states she had elevated liver enzymes earlier this week when presenting for chemo and radiation for colon cancer with mets to the liver, had an ultrasound and was sent to ED for a bladder infection in need of IV antibiotics. Pt is currently having pain in her abdomen

## 2024-10-19 LAB
ALBUMIN SERPL BCP-MCNC: 3.1 G/DL (ref 3.4–5)
ALP SERPL-CCNC: 430 U/L (ref 33–136)
ALT SERPL W P-5'-P-CCNC: 14 U/L (ref 7–45)
ANION GAP SERPL CALC-SCNC: 16 MMOL/L (ref 10–20)
AST SERPL W P-5'-P-CCNC: 46 U/L (ref 9–39)
BASOPHILS # BLD MANUAL: 0 X10*3/UL (ref 0–0.1)
BASOPHILS NFR BLD MANUAL: 0 %
BILIRUB DIRECT SERPL-MCNC: 0.1 MG/DL (ref 0–0.3)
BILIRUB SERPL-MCNC: 0.4 MG/DL (ref 0–1.2)
BUN SERPL-MCNC: 7 MG/DL (ref 6–23)
CALCIUM SERPL-MCNC: 8.9 MG/DL (ref 8.6–10.6)
CHLORIDE SERPL-SCNC: 99 MMOL/L (ref 98–107)
CO2 SERPL-SCNC: 23 MMOL/L (ref 21–32)
CREAT SERPL-MCNC: 0.77 MG/DL (ref 0.5–1.05)
EGFRCR SERPLBLD CKD-EPI 2021: 85 ML/MIN/1.73M*2
EOSINOPHIL # BLD MANUAL: 0.04 X10*3/UL (ref 0–0.7)
EOSINOPHIL NFR BLD MANUAL: 0.9 %
ERYTHROCYTE [DISTWIDTH] IN BLOOD BY AUTOMATED COUNT: 20.5 % (ref 11.5–14.5)
GLUCOSE SERPL-MCNC: 101 MG/DL (ref 74–99)
HCT VFR BLD AUTO: 28.6 % (ref 36–46)
HGB BLD-MCNC: 8.8 G/DL (ref 12–16)
HYPOCHROMIA BLD QL SMEAR: ABNORMAL
IMM GRANULOCYTES # BLD AUTO: 0.02 X10*3/UL (ref 0–0.7)
IMM GRANULOCYTES NFR BLD AUTO: 0.4 % (ref 0–0.9)
LYMPHOCYTES # BLD MANUAL: 0.51 X10*3/UL (ref 1.2–4.8)
LYMPHOCYTES NFR BLD MANUAL: 10.4 %
MAGNESIUM SERPL-MCNC: 1.99 MG/DL (ref 1.6–2.4)
MCH RBC QN AUTO: 28.8 PG (ref 26–34)
MCHC RBC AUTO-ENTMCNC: 30.8 G/DL (ref 32–36)
MCV RBC AUTO: 94 FL (ref 80–100)
MONOCYTES # BLD MANUAL: 0.3 X10*3/UL (ref 0.1–1)
MONOCYTES NFR BLD MANUAL: 6.1 %
NEUTS SEG # BLD MANUAL: 4.05 X10*3/UL (ref 1.2–7)
NEUTS SEG NFR BLD MANUAL: 82.6 %
NRBC BLD-RTO: 0 /100 WBCS (ref 0–0)
OVALOCYTES BLD QL SMEAR: ABNORMAL
PHOSPHATE SERPL-MCNC: 3 MG/DL (ref 2.5–4.9)
PLATELET # BLD AUTO: 227 X10*3/UL (ref 150–450)
POTASSIUM SERPL-SCNC: 3.5 MMOL/L (ref 3.5–5.3)
PROT SERPL-MCNC: 6.5 G/DL (ref 6.4–8.2)
RBC # BLD AUTO: 3.06 X10*6/UL (ref 4–5.2)
RBC MORPH BLD: ABNORMAL
SCHISTOCYTES BLD QL SMEAR: ABNORMAL
SODIUM SERPL-SCNC: 134 MMOL/L (ref 136–145)
TOTAL CELLS COUNTED BLD: 115
URATE SERPL-MCNC: 2.6 MG/DL (ref 2.3–6.7)
WBC # BLD AUTO: 4.9 X10*3/UL (ref 4.4–11.3)

## 2024-10-19 PROCEDURE — 85027 COMPLETE CBC AUTOMATED: CPT

## 2024-10-19 PROCEDURE — 2500000001 HC RX 250 WO HCPCS SELF ADMINISTERED DRUGS (ALT 637 FOR MEDICARE OP)

## 2024-10-19 PROCEDURE — 2500000004 HC RX 250 GENERAL PHARMACY W/ HCPCS (ALT 636 FOR OP/ED)

## 2024-10-19 PROCEDURE — 84100 ASSAY OF PHOSPHORUS: CPT

## 2024-10-19 PROCEDURE — 99222 1ST HOSP IP/OBS MODERATE 55: CPT | Performed by: STUDENT IN AN ORGANIZED HEALTH CARE EDUCATION/TRAINING PROGRAM

## 2024-10-19 PROCEDURE — 83735 ASSAY OF MAGNESIUM: CPT

## 2024-10-19 PROCEDURE — 1100000001 HC PRIVATE ROOM DAILY

## 2024-10-19 PROCEDURE — 84550 ASSAY OF BLOOD/URIC ACID: CPT

## 2024-10-19 PROCEDURE — 2500000002 HC RX 250 W HCPCS SELF ADMINISTERED DRUGS (ALT 637 FOR MEDICARE OP, ALT 636 FOR OP/ED)

## 2024-10-19 PROCEDURE — 36415 COLL VENOUS BLD VENIPUNCTURE: CPT

## 2024-10-19 PROCEDURE — 82248 BILIRUBIN DIRECT: CPT

## 2024-10-19 PROCEDURE — 80048 BASIC METABOLIC PNL TOTAL CA: CPT

## 2024-10-19 PROCEDURE — 87040 BLOOD CULTURE FOR BACTERIA: CPT

## 2024-10-19 PROCEDURE — 85007 BL SMEAR W/DIFF WBC COUNT: CPT

## 2024-10-19 RX ORDER — ACETAMINOPHEN 500 MG
5 TABLET ORAL NIGHTLY PRN
Status: DISCONTINUED | OUTPATIENT
Start: 2024-10-19 | End: 2024-10-20 | Stop reason: HOSPADM

## 2024-10-19 RX ORDER — ACETAMINOPHEN 160 MG/5ML
650 SOLUTION ORAL EVERY 4 HOURS PRN
Status: DISCONTINUED | OUTPATIENT
Start: 2024-10-19 | End: 2024-10-20 | Stop reason: HOSPADM

## 2024-10-19 RX ORDER — ALLOPURINOL 100 MG/1
100 TABLET ORAL 2 TIMES DAILY
Status: DISCONTINUED | OUTPATIENT
Start: 2024-10-19 | End: 2024-10-20 | Stop reason: HOSPADM

## 2024-10-19 RX ORDER — SENNOSIDES 8.6 MG/1
2 TABLET ORAL 2 TIMES DAILY
Status: DISCONTINUED | OUTPATIENT
Start: 2024-10-19 | End: 2024-10-20 | Stop reason: HOSPADM

## 2024-10-19 RX ORDER — ACETAMINOPHEN 325 MG/1
650 TABLET ORAL EVERY 4 HOURS PRN
Status: DISCONTINUED | OUTPATIENT
Start: 2024-10-19 | End: 2024-10-20 | Stop reason: HOSPADM

## 2024-10-19 RX ADMIN — ALLOPURINOL 100 MG: 100 TABLET ORAL at 20:43

## 2024-10-19 RX ADMIN — POLYETHYLENE GLYCOL 3350 17 G: 17 POWDER, FOR SOLUTION ORAL at 09:10

## 2024-10-19 RX ADMIN — PIPERACILLIN SODIUM AND TAZOBACTAM SODIUM 4.5 G: 4; .5 INJECTION, SOLUTION INTRAVENOUS at 09:30

## 2024-10-19 RX ADMIN — SENNOSIDES 17.2 MG: 8.6 TABLET, FILM COATED ORAL at 21:29

## 2024-10-19 RX ADMIN — PIPERACILLIN SODIUM AND TAZOBACTAM SODIUM 4.5 G: 4; .5 INJECTION, SOLUTION INTRAVENOUS at 20:43

## 2024-10-19 RX ADMIN — OLANZAPINE 5 MG: 5 TABLET, FILM COATED ORAL at 20:43

## 2024-10-19 RX ADMIN — ENOXAPARIN SODIUM 40 MG: 100 INJECTION SUBCUTANEOUS at 20:43

## 2024-10-19 RX ADMIN — SODIUM CHLORIDE 1000 ML: 9 INJECTION, SOLUTION INTRAVENOUS at 01:15

## 2024-10-19 RX ADMIN — ACETAMINOPHEN 650 MG: 325 TABLET ORAL at 09:52

## 2024-10-19 RX ADMIN — PIPERACILLIN SODIUM AND TAZOBACTAM SODIUM 4.5 G: 4; .5 INJECTION, SOLUTION INTRAVENOUS at 14:32

## 2024-10-19 RX ADMIN — PANTOPRAZOLE SODIUM 20 MG: 20 TABLET, DELAYED RELEASE ORAL at 09:11

## 2024-10-19 RX ADMIN — CHOLECALCIFEROL (VITAMIN D3) 10 MCG (400 UNIT) TABLET 10 MCG: at 11:48

## 2024-10-19 RX ADMIN — Medication 5 MG: at 21:29

## 2024-10-19 RX ADMIN — ALLOPURINOL 100 MG: 100 TABLET ORAL at 14:21

## 2024-10-19 RX ADMIN — HYDROMORPHONE HYDROCHLORIDE 0.2 MG: 1 INJECTION, SOLUTION INTRAMUSCULAR; INTRAVENOUS; SUBCUTANEOUS at 01:30

## 2024-10-19 SDOH — ECONOMIC STABILITY: FOOD INSECURITY: WITHIN THE PAST 12 MONTHS, THE FOOD YOU BOUGHT JUST DIDN'T LAST AND YOU DIDN'T HAVE MONEY TO GET MORE.: PATIENT DECLINED

## 2024-10-19 SDOH — SOCIAL STABILITY: SOCIAL INSECURITY: ARE YOU OR HAVE YOU BEEN THREATENED OR ABUSED PHYSICALLY, EMOTIONALLY, OR SEXUALLY BY ANYONE?: NO

## 2024-10-19 SDOH — SOCIAL STABILITY: SOCIAL INSECURITY: HAVE YOU HAD THOUGHTS OF HARMING ANYONE ELSE?: NO

## 2024-10-19 SDOH — ECONOMIC STABILITY: HOUSING INSECURITY: IN THE LAST 12 MONTHS, WAS THERE A TIME WHEN YOU WERE NOT ABLE TO PAY THE MORTGAGE OR RENT ON TIME?: NO

## 2024-10-19 SDOH — ECONOMIC STABILITY: FOOD INSECURITY: HOW HARD IS IT FOR YOU TO PAY FOR THE VERY BASICS LIKE FOOD, HOUSING, MEDICAL CARE, AND HEATING?: NOT VERY HARD

## 2024-10-19 SDOH — SOCIAL STABILITY: SOCIAL INSECURITY: ABUSE: ADULT

## 2024-10-19 SDOH — SOCIAL STABILITY: SOCIAL INSECURITY: DO YOU FEEL ANYONE HAS EXPLOITED OR TAKEN ADVANTAGE OF YOU FINANCIALLY OR OF YOUR PERSONAL PROPERTY?: NO

## 2024-10-19 SDOH — ECONOMIC STABILITY: FOOD INSECURITY
WITHIN THE PAST 12 MONTHS, YOU WORRIED THAT YOUR FOOD WOULD RUN OUT BEFORE YOU GOT THE MONEY TO BUY MORE.: PATIENT DECLINED

## 2024-10-19 SDOH — SOCIAL STABILITY: SOCIAL INSECURITY: WITHIN THE LAST YEAR, HAVE YOU BEEN AFRAID OF YOUR PARTNER OR EX-PARTNER?: PATIENT DECLINED

## 2024-10-19 SDOH — ECONOMIC STABILITY: INCOME INSECURITY: IN THE PAST 12 MONTHS HAS THE ELECTRIC, GAS, OIL, OR WATER COMPANY THREATENED TO SHUT OFF SERVICES IN YOUR HOME?: NO

## 2024-10-19 SDOH — ECONOMIC STABILITY: HOUSING INSECURITY: AT ANY TIME IN THE PAST 12 MONTHS, WERE YOU HOMELESS OR LIVING IN A SHELTER (INCLUDING NOW)?: NO

## 2024-10-19 SDOH — SOCIAL STABILITY: SOCIAL INSECURITY: DOES ANYONE TRY TO KEEP YOU FROM HAVING/CONTACTING OTHER FRIENDS OR DOING THINGS OUTSIDE YOUR HOME?: NO

## 2024-10-19 SDOH — SOCIAL STABILITY: SOCIAL INSECURITY: ARE THERE ANY APPARENT SIGNS OF INJURIES/BEHAVIORS THAT COULD BE RELATED TO ABUSE/NEGLECT?: NO

## 2024-10-19 SDOH — SOCIAL STABILITY: SOCIAL INSECURITY
WITHIN THE LAST YEAR, HAVE YOU BEEN RAPED OR FORCED TO HAVE ANY KIND OF SEXUAL ACTIVITY BY YOUR PARTNER OR EX-PARTNER?: PATIENT DECLINED

## 2024-10-19 SDOH — SOCIAL STABILITY: SOCIAL INSECURITY: HAVE YOU HAD ANY THOUGHTS OF HARMING ANYONE ELSE?: NO

## 2024-10-19 SDOH — ECONOMIC STABILITY: TRANSPORTATION INSECURITY: IN THE PAST 12 MONTHS, HAS LACK OF TRANSPORTATION KEPT YOU FROM MEDICAL APPOINTMENTS OR FROM GETTING MEDICATIONS?: NO

## 2024-10-19 SDOH — SOCIAL STABILITY: SOCIAL INSECURITY
WITHIN THE LAST YEAR, HAVE YOU BEEN KICKED, HIT, SLAPPED, OR OTHERWISE PHYSICALLY HURT BY YOUR PARTNER OR EX-PARTNER?: PATIENT DECLINED

## 2024-10-19 SDOH — SOCIAL STABILITY: SOCIAL INSECURITY: DO YOU FEEL UNSAFE GOING BACK TO THE PLACE WHERE YOU ARE LIVING?: NO

## 2024-10-19 SDOH — SOCIAL STABILITY: SOCIAL INSECURITY: WERE YOU ABLE TO COMPLETE ALL THE BEHAVIORAL HEALTH SCREENINGS?: YES

## 2024-10-19 SDOH — SOCIAL STABILITY: SOCIAL INSECURITY: HAS ANYONE EVER THREATENED TO HURT YOUR FAMILY OR YOUR PETS?: NO

## 2024-10-19 SDOH — SOCIAL STABILITY: SOCIAL INSECURITY
WITHIN THE LAST YEAR, HAVE YOU BEEN HUMILIATED OR EMOTIONALLY ABUSED IN OTHER WAYS BY YOUR PARTNER OR EX-PARTNER?: PATIENT DECLINED

## 2024-10-19 ASSESSMENT — ACTIVITIES OF DAILY LIVING (ADL)
PATIENT'S MEMORY ADEQUATE TO SAFELY COMPLETE DAILY ACTIVITIES?: YES
BATHING: INDEPENDENT
LACK_OF_TRANSPORTATION: NO
HEARING - LEFT EAR: FUNCTIONAL
FEEDING YOURSELF: INDEPENDENT
LACK_OF_TRANSPORTATION: NO
JUDGMENT_ADEQUATE_SAFELY_COMPLETE_DAILY_ACTIVITIES: YES
TOILETING: INDEPENDENT
DRESSING YOURSELF: INDEPENDENT
ADEQUATE_TO_COMPLETE_ADL: YES
GROOMING: INDEPENDENT
HEARING - RIGHT EAR: FUNCTIONAL
WALKS IN HOME: INDEPENDENT
LACK_OF_TRANSPORTATION: NO

## 2024-10-19 ASSESSMENT — LIFESTYLE VARIABLES
HOW OFTEN DO YOU HAVE 6 OR MORE DRINKS ON ONE OCCASION: NEVER
SKIP TO QUESTIONS 9-10: 1
AUDIT-C TOTAL SCORE: 0
HOW OFTEN DO YOU HAVE A DRINK CONTAINING ALCOHOL: NEVER
AUDIT-C TOTAL SCORE: 0
HOW MANY STANDARD DRINKS CONTAINING ALCOHOL DO YOU HAVE ON A TYPICAL DAY: PATIENT DOES NOT DRINK

## 2024-10-19 ASSESSMENT — PAIN - FUNCTIONAL ASSESSMENT
PAIN_FUNCTIONAL_ASSESSMENT: 0-10

## 2024-10-19 ASSESSMENT — PAIN SCALES - WONG BAKER: WONGBAKER_NUMERICALRESPONSE: HURTS LITTLE MORE

## 2024-10-19 ASSESSMENT — PAIN SCALES - GENERAL
PAINLEVEL_OUTOF10: 0 - NO PAIN
PAINLEVEL_OUTOF10: 3
PAINLEVEL_OUTOF10: 8
PAINLEVEL_OUTOF10: 0 - NO PAIN

## 2024-10-19 ASSESSMENT — PATIENT HEALTH QUESTIONNAIRE - PHQ9
2. FEELING DOWN, DEPRESSED OR HOPELESS: NOT AT ALL
SUM OF ALL RESPONSES TO PHQ9 QUESTIONS 1 & 2: 0
1. LITTLE INTEREST OR PLEASURE IN DOING THINGS: NOT AT ALL

## 2024-10-19 ASSESSMENT — COGNITIVE AND FUNCTIONAL STATUS - GENERAL
DAILY ACTIVITIY SCORE: 24
PATIENT BASELINE BEDBOUND: NO
MOBILITY SCORE: 24

## 2024-10-19 NOTE — PROGRESS NOTES
Elana Dodd is a 69 y.o. female on day 1 of admission presenting with RUQ abdominal pain.    Subjective   Pt interviewed and examined at bedside. Pt was distressed about current situation regarding metastasis and missed chemo appointments. Pt was also diaphoretic and hot on interview.  Pt denies any abdominal pains , chest pains , respiratory distress.    Objective   Last Recorded Vitals  BP (!) 136/95   Pulse 102   Temp (!) 38.2 °C (100.8 °F)   Resp 16   Wt 65.8 kg (145 lb)   SpO2 96%       Scheduled medications  cholecalciferol, 400 Units, oral, Daily  enoxaparin, 40 mg, subcutaneous, q24h  OLANZapine, 5 mg, oral, Nightly  pantoprazole, 20 mg, oral, Daily before breakfast  piperacillin-tazobactam, 4.5 g, intravenous, q6h  polyethylene glycol, 17 g, oral, Daily      PRN medications  PRN medications: acetaminophen **OR** acetaminophen, HYDROmorphone, LORazepam, oxyCODONE      Results for orders placed or performed during the hospital encounter of 10/18/24 (from the past 24 hours)   CBC and Auto Differential   Result Value Ref Range    WBC 4.9 4.4 - 11.3 x10*3/uL    nRBC 0.0 0.0 - 0.0 /100 WBCs    RBC 3.06 (L) 4.00 - 5.20 x10*6/uL    Hemoglobin 8.8 (L) 12.0 - 16.0 g/dL    Hematocrit 28.6 (L) 36.0 - 46.0 %    MCV 94 80 - 100 fL    MCH 28.8 26.0 - 34.0 pg    MCHC 30.8 (L) 32.0 - 36.0 g/dL    RDW 20.5 (H) 11.5 - 14.5 %    Platelets 227 150 - 450 x10*3/uL    Immature Granulocytes %, Automated 0.4 0.0 - 0.9 %    Immature Granulocytes Absolute, Automated 0.02 0.00 - 0.70 x10*3/uL   Magnesium   Result Value Ref Range    Magnesium 1.99 1.60 - 2.40 mg/dL   Hepatic Function Panel   Result Value Ref Range    Albumin 3.1 (L) 3.4 - 5.0 g/dL    Bilirubin, Total 0.4 0.0 - 1.2 mg/dL    Bilirubin, Direct 0.1 0.0 - 0.3 mg/dL    Alkaline Phosphatase 430 (H) 33 - 136 U/L    ALT 14 7 - 45 U/L    AST 46 (H) 9 - 39 U/L    Total Protein 6.5 6.4 - 8.2 g/dL   Phosphorus   Result Value Ref Range    Phosphorus 3.0 2.5 - 4.9 mg/dL    Basic Metabolic Panel   Result Value Ref Range    Glucose 101 (H) 74 - 99 mg/dL    Sodium 134 (L) 136 - 145 mmol/L    Potassium 3.5 3.5 - 5.3 mmol/L    Chloride 99 98 - 107 mmol/L    Bicarbonate 23 21 - 32 mmol/L    Anion Gap 16 10 - 20 mmol/L    Urea Nitrogen 7 6 - 23 mg/dL    Creatinine 0.77 0.50 - 1.05 mg/dL    eGFR 85 >60 mL/min/1.73m*2    Calcium 8.9 8.6 - 10.6 mg/dL   Manual Differential   Result Value Ref Range    Neutrophils %, Manual 82.6 40.0 - 80.0 %    Lymphocytes %, Manual 10.4 13.0 - 44.0 %    Monocytes %, Manual 6.1 2.0 - 10.0 %    Eosinophils %, Manual 0.9 0.0 - 6.0 %    Basophils %, Manual 0.0 0.0 - 2.0 %    Seg Neutrophils Absolute, Manual 4.05 1.20 - 7.00 x10*3/uL    Lymphocytes Absolute, Manual 0.51 (L) 1.20 - 4.80 x10*3/uL    Monocytes Absolute, Manual 0.30 0.10 - 1.00 x10*3/uL    Eosinophils Absolute, Manual 0.04 0.00 - 0.70 x10*3/uL    Basophils Absolute, Manual 0.00 0.00 - 0.10 x10*3/uL    Total Cells Counted 115     RBC Morphology See Below     Hypochromia Mild     RBC Fragments Few     Ovalocytes Few    Uric Acid   Result Value Ref Range    Uric Acid 2.6 2.3 - 6.7 mg/dL   Blood Culture    Specimen: Peripheral Venipuncture; Blood culture   Result Value Ref Range    Blood Culture Loaded on Instrument - Culture in progress    Blood Culture    Specimen: Peripheral Venipuncture; Blood culture   Result Value Ref Range    Blood Culture Loaded on Instrument - Culture in progress        Physical Exam  Constitutional:       General: She is not in acute distress.     Appearance: She is ill-appearing and diaphoretic.   HENT:      Mouth/Throat:      Mouth: Mucous membranes are moist.      Pharynx: Oropharynx is clear.   Eyes:      General: No scleral icterus.     Extraocular Movements: Extraocular movements intact.      Conjunctiva/sclera: Conjunctivae normal.   Cardiovascular:      Rate and Rhythm: Regular rhythm. Tachycardia present.      Pulses: Normal pulses.      Heart sounds: Normal heart  sounds.   Pulmonary:      Effort: Pulmonary effort is normal. No respiratory distress.      Breath sounds: Normal breath sounds.   Abdominal:      General: Abdomen is flat. Bowel sounds are normal. There is no distension.      Palpations: Abdomen is soft.      Tenderness: There is no abdominal tenderness. There is no guarding.      Comments: Negative Aj Sign.   Musculoskeletal:      Cervical back: Neck supple.   Skin:     General: Skin is warm.      Coloration: Skin is not jaundiced.      Findings: No rash.   Neurological:      General: No focal deficit present.      Mental Status: She is alert and oriented to person, place, and time.      Cranial Nerves: No cranial nerve deficit.   Psychiatric:         Mood and Affect: Mood normal.         Behavior: Behavior normal.         Thought Content: Thought content normal.       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  CT A/P show GB wall thickening with pericholecystic fluid and increased GB wall edema. Could likely reflect infiltration and inflammation of metastatic disease. Increase in size of hypodense lesions and perihepatic fluid since last study. Bile ducts are not dilated. Labs notable for elevated alkaline phosphatase 430 , other LFTs WNL. Pt is diaphoretic with fever of 100.8 , endorses chills , hemodynamically stable. CBC shows no leukocytosis.  Oncology Consulted. Follows Dr. Hu.     # RUQ Pain - Liver Metastasis vs. Cholecystitis  :: Cycle 5 of Chemo initially scheduled for 10/16 but delayed due to this RUQ pain and hypomagnesemia  :: Oncology Consulted - Appreciate Recs , Dr. Felipa Hu is pts regular oncologist , oncology team said they will be in  contact with her.    [  ] Blood Cultures    - Zosyn 4.5g q6h     - Pain and Comfort: Supportive Oncology Conuslted  - Home Regimen: oxycodone 15mg q6h PRN  - Dilaudid 0.4mg severe pain breakthrough q3h PRN  - Olanzapine 5mg nightly - nausea  - Bowel Regimen: Miralax  - Continue Ativan 0.5mg TID PRN for anxiety  - Continue Zyprexa 5 mg nightly        # Drug induced rash - panitumumab   - Minocycline 100mg daily for rash prevention stopped as patient not taking  - Hydrocortisone cream and clindamycin lotion PRN on rash if develops (currently none)     # Hyponatremia  :: Given 1 L Bolus NS -> Trended Up To 134  :: Suspected Low PO Intake  :: Urine Electrolytes Suggestive of Low PO Intake - Na 12 , Cr 29     # Gout  :: Uric Acid Level - 2.6 WNL  - Continue home allopurinol 100 mg BID     # HTN  - Normotensive , hold home amlodipine     # R Breast Cancer   - Continue home letrozole     # Rheumatoid Arthritis  - Hold home leflunomide for now due to risk for hepatotoxicity     # Normocytic Anemia (8.8 HgB)  - Hgb 9.8 at baseline      F: PRN  E: PRN  A: PIV  N: Regular Adult  Oxygen: RA  GI Ppx: Pantoprazole 20mg   Bowel: Miralax  DVT: Lovenox     DNR/DNI (confirmed on admission)  NOK: Nakia Shelton [phone]        TOM ELIZABETH , MS4

## 2024-10-19 NOTE — HOSPITAL COURSE
Elana Dodd is a 70yo F with HTN, RA (on leflunomide), HLD ,GERD , s/p R nephrectomy (1998, donated kidney to sister), R breast cancer on letrozole (since 7/2021), gout and colon cancer with recurrent metastatic disease to the liver, who presented to the ED for RUQ pain and ultrasound at [facility] concerning for acute cholecystitis. Vitals notable for a fever to 100.8 with chills. Otherwise, hemodynamically stable. CBC without leukocytosis. CT A/P showed GB wall thickening with pericholecystic fluid and increased GB wall edema, which could reflect infiltration and inflammation of metastatic disease. There was an increase in size of hypodense lesions and perihepatic fluid since the last study. Bile ducts were not dilated. Surgical oncology consulted: less likely cholecystitis with no plan for cholecystectomy or percutaneous cholecystotomy tube. She was started empirically on Zosyn pending cultures. UA unremarkable. Blood cultures negative to date. Oncology consulted, who believed the pain was from tumor necrosis around the liver. Patient feeling better with less perspiration. She was discharged on ciprofloxacin and metronidazole.

## 2024-10-19 NOTE — CARE PLAN
The patient's goals for the shift include  to feel better    The clinical goals for the shift include patient will afebrile with VSS by end of shift    Over the shift, the patient is calm and cooperative. Patient had increase temp, heart rate, and blood pressure in the morning. Tylenol given, BC drawn, antibiotics started.     Patient afebrile and normal HR in the afternoon.

## 2024-10-19 NOTE — H&P
History Of Present Illness  Elana Dodd is a 69 y.o. female with HTN, RA (on leflunomide), HLD, GERD, s/p R nephrectomy (1998, donated kidney to sister), R breast cancer on letrozole (since 7/2021), and colon cancer with recurrent metastatic disease to the liver. Present to the ED with RUQ pain, vomiting and diarrhea for 3 weeks. Patient states that this has been going on for six months, but that over the past week pain has gotten worse. She had to miss her daughter's wedding in [place] a few weeks ago as she did not feel well enough to travel. She says that she has episodes with chills that are worse, but no temperature recorded. She has not taken anything for the pain other than her normal pain medication. Denies any chest pain or shortness of breath. Does note constipation, with last BM two days ago. Ros otherwise negative for pre-sycnope, syncope, nausea, emesis, dysuria, or hematuira.      Cycle 5 was scheduled for 10/16/2024 but she missed her pre-chemo onc appointment for bloodwork. So, on C5D1 she had pre-chemo labs done which showed Mg 1.2 and . Sabi Tapia CNP decided to hold off on chemo, and repleted mag and iron. RUQ liver ultrasound done that day showed diffusely thickened gallbladder wall is thin the setting of an incompletely distended gallbladder. She was sent to the ED for further evaluation of hcoelcystiits.      ED Course:  Vitals:   T 36.1 °C (96.9 °F)  HR 90  /77  RR 16  O2 98 % None (Room air)     Relevant studies:  -CBC: HB 9.8 (baseline 9-10), unremarkable smear  -CMP: Na 131, Mag 1.5, Cr 0.8 (baseline)  -lipase neg  -UA negative for UTI, spec grav 1.003     CT abd pelvis with IV contrast:  IMPRESSION:  1.  There is gallbladder wall thickening with associated  pericholecystic fluid, and increasing gallbladder wall edema. These  findings are felt to reflect inflammation and infiltration by the  adjacent metastatic disease within the liver. The known  metastatic  disease has slightly worsened since the prior image as indicated by  increase in size of multiple small hypodense lesions, increase in  liver size, and increased perihepatic fluid, which may explain  patient's worsening right upper quadrant pain.  2. Stable enlarged portacaval lymph nodes reflecting patient's known  metastatic colon cancer.  3. Additional chronic findings as described above.     Interventions:  -Mag 2g IV  -Morphine 4mg IV  -Zofran 4mg IV           Medical History:  PMH: above     Oncologic History (per Dr. Hu last note):  Diagnosis: recurrent colon adenocarcinoma with metastatic disease to the liver   MMR: proficient  NGS: Caris: BRYANT, mutations in DACH1, SETD2, TP53, APC  Tempus xF: pending   CEA: 44.2 (6/3/24), 67.8 (7/31/24)  CA 19-9: 178 (6/3/24)     Ms. Elana Dodd is a 70 yo F with PMH of HTN, RA (on leflunomide), HLD, GERD, s/p R nephrectomy (1998, donated kidney to sister), R breast cancer (8/20/20, ER+/TN+/HER2 1+ by IHC/AMARIS+) s/p R lumpectomy and R SLN (11/5/2020) followed by carboplatin/Taxotere/Herceptin x4C (stopped due to SE) and R RT (completed 7/2021) on letrozole (since 7/2021), and stage IIA colon cancer s/p laparoscopic sigmoid colectomy and colostomy and en bloc resection with appendectomy and L ureterolysis (7/6/2020) with subsequent pelvic RT (completed 10/15/2020) lost to follow-up found to have recurrent metastatic colon cancer to the liver.     She was previously diagnosed and treated for her breast and colon cancers in [place].      6/23/2020 - PET/CT - hypermetabolic mass in sigmoid colon & hypermet mass in R breast      7/6/2020 - sigmoid colon resection - pathology: Moderately differentiated invasive adenocarcinoma, 5.5 cm in diameter. Invades through muscularis propria focally into subserosal tissue. Marked adhesion of small bowel segments to colon without involvement of malignancy, +0/28 LN, negative margins, small focus of carcinoma in the serosa of  margin furthest from this tumor (pT3 N0), at least stage IIA     8/12/2020 - BL mammogram & US: 3.6 cm mass in R breast     8/20/2020 - R breast biopsy - path: Invasive poorly differentiated ductal carcinoma. ER positive and WA positive and HER-2 by IHC is 1+, but by HER-2 AMARIS is positive. On MammaPrint testing malignancy is ER and WA positive and HER-2 positive, high risk, with a predicted 5-year metastasis free survival of 93% with chemotherapy and hormonal therapy versus 71% metastasis free survival with hormonal therapy alone.      10/15/2020 - Completed pelvic radiation      11/5/2020 - R lumpectomy and R SLNBx - path: 2.6 cm invasive ductal carcinoma, with all margins free of malignancy. The malignancy comes to within 1 mm of the closest deep margin. Perineural invasion is present. There is a focus of metastatic carcinoma, measuring 0.5 mm in 1 of 2 sentinel lymph nodes. pT2 pN1mic. Stage IIB       2021 - Repeat scans negative. Transitioned her care to her PCP where she was being prescribed letrozole. Has not had repeat mammogram or colonoscopy.     4/10/24 - Re-established care with medical oncology with Dr. Riccardo Blake at [facility]. Had not been seen by medical oncology since August 2021. Letrozole had been refilled by PCP. CT CAP, colonoscopy, CEA, CA 19-9, CA 27.29 were all ordered.      6/18/24 - CT A/P w contrast - large hepatic mass (7.5x8.7cm) involving both anterior segment of R lobe as well as medial segment of L lobe likely related to metastatic disease possibly colon cancer however met breast CA not excluded w hx; enlarged mohit hepatic LN likely metastatic, probably stable sidebranch IPMN and in pancreas   CT chest non con - stable tiny nodule superolateral LLL (3mm)     6/24/24 - Dr. Blake discussed imaging. Biopsy ordered.      7/1/24 - CT guided biopsy of liver lesion, path showed adenocarcinoma, metastatic from patient's known colonic primary, IHC +CK20, CDX2, SATB2 while negative for GATA3 and  CK7, pMMR      Current Treatment:  7/31/24 - present: FOLFOX + panitumumab (panitumumab added starting C3), Cycle 5 scheduled for 10/16/2024 but she missed her pre-chemo onc appointment. On C5D1 she had labs done which showed MG 1.2 and . Sabi Tapia CNP decided to hold off on chemo, and replete mag and iron. RUQ liver ultrasound showed diffusely thickened gallbladder wall is thin the setting of an incompletely distended gallbladder. Cholecystitis is a possibility, so she was sent to the ED for evaluation.         Medications prior to admission: reviewed (patient confirmed medications with pharmacy)       Current Outpatient Medications   Medication Instructions    allopurinol (ZYLOPRIM) 300 mg, 2 times daily    amLODIPine (Norvasc) 5 mg tablet 1 tablet, Daily    atorvastatin (Lipitor) 20 mg tablet 1 tablet, Daily    cholecalciferol (Vitamin D-3) 25 MCG (1000 UT) capsule 1 capsule, Daily    clindamycin (Cleocin T) 1 % lotion Apply topically to affected area twice daily.    doxepin (SINEQUAN) 10 mg, oral, Nightly PRN    hydrocortisone 2.5 % cream Topical, 2 times daily, To area of rash    leflunomide (Arava) 10 mg tablet Daily RT    letrozole (FEMARA) 2.5 mg, Daily    LORazepam (ATIVAN) 0.5 mg, oral, Every 8 hours PRN    minocycline 100 mg, oral, Every 24 hours, For rash prevention    OLANZapine (ZYPREXA) 5 mg, oral, Nightly, 1 tab at bedtime for 3 days, starting the night of chemo    omeprazole (PRILOSEC) 40 mg    ondansetron (ZOFRAN) 8 mg, oral, Every 8 hours PRN    oxyCODONE (ROXICODONE) 15 mg, oral, Every 6 hours PRN, G89.3    prochlorperazine (COMPAZINE) 10 mg, oral, Every 6 hours PRN         Allergies:   RX Allergies   No Known Allergies        PSH:   Surgical History         Past Surgical History:   Procedure Laterality Date    NEPHRECTOMY         1998, donated to sister    TOE SURGERY Left       3/2023    TOE SURGERY Right       4/2024            FamHx:  Family History          Family History    Problem Relation Name Age of Onset    Other (mouth cancer) Sister           Daughter with mastectomy for breast cancer     SocHx:  Home: lives w/ cousin  Functional status: independent in ADLs and iADLs     Substance use:   -Alcohol: none  -Tobacco: vapes  -Recreational drugs: denies     ROS: 10-point ROS negative unless otherwise mentioned in HPI                   Allergies  Patient has no known allergies.       Physical Exam  Constitutional:       Appearance: Normal appearance.   HENT:      Nose: No congestion.      Mouth/Throat:      Mouth: Mucous membranes are dry.   Eyes:      Pupils: Pupils are equal, round, and reactive to light.   Cardiovascular:      Rate and Rhythm: Normal rate and regular rhythm.   Pulmonary:      Effort: Pulmonary effort is normal.      Breath sounds: Normal breath sounds.   Abdominal:      General: There is no distension.      Palpations: Abdomen is soft.      Comments: TTP in RUQ with deep palpation   Skin:     General: Skin is warm and dry.   Neurological:      General: No focal deficit present.      Mental Status: She is alert.   Psychiatric:      Comments: anxious            Last Recorded Vitals  /75   Pulse 84   Temp 36.1 °C (96.9 °F) (Temporal)   Resp 16   Wt 65.8 kg (145 lb)   SpO2 96%     Relevant Results             Assessment/Plan   Assessment & Plan  Right upper quadrant abdominal pain    69 y.o. female with HTN, RA (on leflunomide), HLD, GERD, s/p R nephrectomy (1998, donated kidney to sister), R breast cancer on letrozole (since 7/2021), and colon cancer with recurrent metastatic disease to the liver, who presented to the ED for RUQ pain and ultrasound concerning for acute cholecystitis, however CT A/P appears more consistent with metastatic disease and chronic inflammation, no surgical intervention. Labs notable for baseline anemia, hyponatremia, and elevated alkaline phosphatase. Patient endorses chills, but is afebrile, hemodynamically stable, and CBC  without leukocytosis. Low suspicion for infection at this time and will defer abx.  Will continue with supportive care with pain control and bowel regimen.      #RUQ pain - cancer related pain  #Adenocarcinoma of the colon with liver mets  -mFOLFOX6 Cycle 5 initially scheduled for 10/16 but delayed for hypomagnesemia and RUQ pain  -Email primary oncologist Dr. Felipa Hu in the morning  -Pain/comfort plan per supportive onc:  -Home regimen: oxycodone 15mg q6hrs PRN  -Dilaudid 0.4 mg severe pain breakthrough  -continue zofran 8mg q8hrs PRN first line, and compazine 10mg q6hrs PRN second line   -bowel regimen: Miralax BID, senna 8.6mg BID, milk of magnesia or lactulose Prn if no bowel movement  -Continue Ativan 0.5mg tid PRN for anxiety  - Continue Zyprexa 5 mg nightly  - AM supportive onc consult     #Drug induced rash - panitumumab   -Minocycline 100mg daily for rash prevention stopped as patient not taking  -Hydrocortisone cream and clindamycin lotion as needed on rash if develops (currently none)     #Hyponatremia  - Suspect low EABV iso poor PO intake  - Urine electrolytes, urine osm, serum osm ordered  - Follow up Na after 1 L bolus    #Gout  - Continue with allopurinol 100 mg BID    #HTN  - Normotensive, hold home amlodipine     #R breast cancer   -Continue home letrozole    #Rheumatoid arthritis  - hold home leflunomide due to risk for hepatotoxicity    #Normocytic anemia  - Hgb 9.8 at baseline    F:  1L NaCL ordered  E: replete PRN  N: regular diet  DVT: Lovenox    DNR-DNI (confirmed on admission)    CHRISTIAN Shelton [phone]    Pt to be fully staffed in the am.     Leah Bowen MD

## 2024-10-19 NOTE — PROGRESS NOTES
Elana Dodd is a 69 y.o. y.o. female on day 1 of admission presenting with Right upper quadrant abdominal pain [R10.11].     Subjective      10/19/24 1416   Discharge Planning   Living Arrangements Family members   Support Systems Family members   Assistance Needed No discharge needs identified   Type of Residence Private residence   Home or Post Acute Services None   Expected Discharge Disposition Home   Does the patient need discharge transport arranged? No   Financial Resource Strain   How hard is it for you to pay for the very basics like food, housing, medical care, and heating? Not hard   Housing Stability   In the last 12 months, was there a time when you were not able to pay the mortgage or rent on time? N   In the past 12 months, how many times have you moved where you were living? 0   At any time in the past 12 months, were you homeless or living in a shelter (including now)? N   Transportation Needs   In the past 12 months, has lack of transportation kept you from medical appointments or from getting medications? no   In the past 12 months, has lack of transportation kept you from meetings, work, or from getting things needed for daily living? No     Met with patient at bedside to complete discharge planning assessment. Patient was admitted for right upper quadrant abd pain and per primary team, plan is to consult oncology & treating patient with antibiotics. Prior to admission, patient was residing at home with her cousin Tyler. Patient reports being independent with her ADL's and iADL's prior to admissions. Patient denies any discharge needs at this time. SW/TCC remain available as needed.    PCP: Dr. Mcclendon; last visit was approximately 8 months ago  Specialists: Patient follows with Dr. Hu for oncology at  [place]  Recent Falls: Denies  Assistive Devices: None  Medical Equipment: None  Pharmacy: [facility] in [place]  Transportation: Patient reports she has family that will transport home at  discharge    - Ximena MCCANN MA, [facility]  Care Transitions   [facility] Secure Chat or [phone]

## 2024-10-19 NOTE — CONSULTS
Name: Elana Dodd  MRN: [id number]  Encounter Date: 10/19/2024  PCP: Rafat Mcclendon MD  Heme-Onc: Dr. Hu    Reason for consult: concern for progression   Attending Provider: Dr. Tay     Hematology/ Oncology Consult Note      History of Present Illness   Elana Dodd is a 69 y.o. female with recurrent metastatic CRC s/p C4 FOLFOX who was admitted prior to C5 after labs showed elevated alk phos and hypomagnesemia. She also had subacute n/v/d and RUQ pain. CT A/P with contrast 10/18/24 noted concern for slightly worsened disease for which oncology is consulted.     Patient was evaluated by surg onc 10/18/24 and did not think the patient's presentation was entirely consistent with cholecystitis. Patient spiked a fever 10/19/24 for which she was started on zosyn.     Patient seen at bedside. Patient notes migratory arthritis in her upper extremities that she attributes to gout and persistent fatigue. She states her abdominal pain is not significantly worse than prior.     Heme/Onc History    Diagnosis: recurrent colon adenocarcinoma with metastatic disease to the liver   MMR: proficient  NGS: Caris: BRYANT, mutations in DACH1, SETD2, TP53, APC  Tempus xF: TP53, APC, TMB 8.6, BRYANT   CEA: 44.2 (6/3/24), 67.8 (7/31/24)  CA 19-9: 178 (6/3/24)      Oncologic History:      Ms. 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     She was previously  diagnosed and treated for her breast and colon cancers in [place].      6/23/2020 - PET/CT - hypermetabolic mass in sigmoid colon & hypermet mass in R breast      7/6/2020 - sigmoid colon resection - pathology: Moderately differentiated invasive adenocarcinoma, 5.5 cm in diameter. Invades through muscularis propria focally into subserosal tissue. Marked adhesion of small bowel segments to colon without involvement of malignancy, +0/28 LN, negative margins, small focus of carcinoma in the serosa of margin furthest from this tumor (pT3 N0), at least stage IIA     8/12/2020 - BL mammogram & US: 3.6 cm mass in R breast     8/20/2020 - R breast biopsy - path: Invasive poorly differentiated ductal carcinoma. ER positive and MT positive and HER-2 by IHC is 1+, but by HER-2 AMARIS is positive. On MammaPrint testing malignancy is ER and MT positive and HER-2 positive, high risk, with a predicted 5-year metastasis free survival of 93% with chemotherapy and hormonal therapy versus 71% metastasis free survival with hormonal therapy alone.      10/15/2020 - Completed pelvic radiation      11/5/2020 - R lumpectomy and R SLNBx - path: 2.6 cm invasive ductal carcinoma, with all margins free of malignancy. The malignancy comes to within 1 mm of the closest deep margin. Perineural invasion is present. There is a focus of metastatic carcinoma, measuring 0.5 mm in 1 of 2 sentinel lymph nodes. pT2 pN1mic. Stage IIB     1/8/2021 - CT CAP - RLQ colostomy in tact, tiny nodules LLL nonspecific     5/11/2021 - CT CAP - stable small pulm nodules, worsening ventral hernia, interval takedown of LLQ ostomy, no evidence of met disease      Transitioned her care to her PCP where she was being prescribed letrozole. Has not had repeat mammogram or colonoscopy.     4/10/24 - Re-established care with medical oncology with Dr. Riccardo Blake at [facility]. Had not been seen by medical oncology since August 2021. Letrozole had been refilled by PCP. CT CAP,  colonoscopy, CEA, CA 19-9, CA 27.29 were all ordered.      6/18/24 - CT A/P w contrast - large hepatic mass (7.5x8.7cm) involving both anterior segment of R lobe as well as medial segment of L lobe likely related to metastatic disease possibly colon cancer however met breast CA not excluded w hx; enlarged mohit hepatic LN likely metastatic, probably stable sidebranch IPMN and in pancreas   CT chest non con - stable tiny nodule superolateral LLL (3mm)     6/24/24 - Dr. Blake discussed imaging. Biopsy ordered.      7/1/24 - CT guided biopsy of liver lesion, path showed adenocarcinoma, metastatic from patient's known colonic primary, IHC +CK20, CDX2, SATB2 while negative for GATA3 and CK7, pMMR      Current Treatment:     7/24 - supposed to start FOLFOX. Had issues with skin open over port line.     7/31/24 - present: FOLFOX + panitumumab (panitumumab added starting C3)      C4 - Stopped panitumumab per patient request as infusion time too long    Past Medical history     Past Medical History:   Diagnosis Date    Breast cancer (Multi)     2020 right breast    Colon cancer (Multi)     2020    GERD (gastroesophageal reflux disease)     HLD (hyperlipidemia)     HTN (hypertension)     Rheumatoid arthritis (Multi)          Past Surgical History     Past Surgical History:   Procedure Laterality Date    NEPHRECTOMY      1998, donated to sister    TOE SURGERY Left     3/2023    TOE SURGERY Right     4/2024         Family History      Family History   Problem Relation Name Age of Onset    Other (mouth cancer) Sister           Social History     Social History     Socioeconomic History    Marital status: Single   Tobacco Use    Smoking status: Former     Types: Cigarettes     Passive exposure: Current    Smokeless tobacco: Current   Vaping Use    Vaping status: Every Day    Substances: Nicotine    Devices: Disposable, Refillable tank   Substance and Sexual Activity    Alcohol use: Not Currently     Comment: seldom beer    Drug  "use: Never     Social Drivers of Health     Financial Resource Strain: Low Risk  (10/19/2024)    Overall Financial Resource Strain (CARDIA)     Difficulty of Paying Living Expenses: Not hard at all   Food Insecurity: Patient Declined (10/19/2024)    Hunger Vital Sign     Worried About Running Out of Food in the Last Year: Patient declined     Ran Out of Food in the Last Year: Patient declined   Transportation Needs: No Transportation Needs (10/19/2024)    PRAPARE - Transportation     Lack of Transportation (Medical): No     Lack of Transportation (Non-Medical): No   Intimate Partner Violence: Patient Declined (10/19/2024)    Humiliation, Afraid, Rape, and Kick questionnaire     Fear of Current or Ex-Partner: Patient declined     Emotionally Abused: Patient declined     Physically Abused: Patient declined     Sexually Abused: Patient declined   Housing Stability: Low Risk  (10/19/2024)    Housing Stability Vital Sign     Unable to Pay for Housing in the Last Year: No     Number of Times Moved in the Last Year: 0     Homeless in the Last Year: No         Allergies   No Known Allergies    Medications   allopurinol, 100 mg, BID  cholecalciferol, 400 Units, Daily  enoxaparin, 40 mg, q24h  OLANZapine, 5 mg, Nightly  pantoprazole, 20 mg, Daily before breakfast  piperacillin-tazobactam, 4.5 g, q6h  polyethylene glycol, 17 g, Daily         acetaminophen, 650 mg, q4h PRN   Or  acetaminophen, 650 mg, q4h PRN  HYDROmorphone, 0.2 mg, q3h PRN  LORazepam, 0.5 mg, q6h PRN  oxyCODONE, 15 mg, q6h PRN        Review of Systems   Review of Systems   10 pt ROS reviewed and negative aside from above    Physical Exam   Blood pressure (!) 136/95, pulse 72, temperature 36.7 °C (98.1 °F), resp. rate 16, height 1.67 m (5' 5.75\"), weight 65.8 kg (145 lb), SpO2 96%.    ECO    Gen: awake, alert, in no acute distress  HEENT: AT/NC, PEERL, EOMI  CV: RRR, no m/r/g  Pulm: CTAB, room air  Abd: soft, minimal tenderness on right, no guarding, " negative Aj's   Ext: no LE edema  Skin: warm and dry  Neuro: A&Ox3, moves all 4 extremities spontaneously     Labs     Lab Results   Component Value Date    GLUCOSE 101 (H) 10/19/2024    CALCIUM 8.9 10/19/2024     (L) 10/19/2024    K 3.5 10/19/2024    CO2 23 10/19/2024    CL 99 10/19/2024    BUN 7 10/19/2024    CREATININE 0.77 10/19/2024       Lab Results   Component Value Date    WBC 4.9 10/19/2024    HGB 8.8 (L) 10/19/2024    HCT 28.6 (L) 10/19/2024    MCV 94 10/19/2024     10/19/2024       Lab Results   Component Value Date    ALT 14 10/19/2024    AST 46 (H) 10/19/2024    ALKPHOS 430 (H) 10/19/2024    BILITOT 0.4 10/19/2024       Imaging   CT 10/18/24:  IMPRESSION:  1.  There is gallbladder wall thickening with associated  pericholecystic fluid, and increasing gallbladder wall edema. These  findings are felt to reflect inflammation and infiltration by the  adjacent metastatic disease within the liver. The known metastatic  disease has slightly worsened since the prior image as indicated by  increase in size of multiple small hypodense lesions, increase in  liver size, and increased perihepatic fluid, which may explain  patient's worsening right upper quadrant pain.  2. Stable enlarged portacaval lymph nodes reflecting patient's known  metastatic colon cancer.  3. Additional chronic findings as described above.    Assessment/Plan     Elana Dodd is a 69 y.o. female with recurrent metastatic CRC s/p C4 FOLFOX who was admitted prior to C5 after labs showed elevated alk phos and hypomagnesemia. She also had subacute n/v/d and RUQ pain. CT A/P with contrast 10/18/24 noted concern for slightly worsened disease for which oncology is consulted.     In terms of concern for progression, it is difficult to compare patient's scans as her baseline scans 6/2024 were from [facility] and not available at this time on PACS. Nevertheless, her main large liver mass was described as 7.5 x 8.7 x 12.1 cm 6/2024 and  improved to 4.7 x 8.3 x 10.6 cm 9/2024 and is similar on repeat scan 10/18/24. There is are smaller scattered lesions in the liver, one of which may be slightly larger. However, given the the improvement in her main mass and decrease in CEA from 67.8 to 35.7, would not consider this progression of disease.     Patient's pain is likely from tumour necrosis around the liver capsule. Noted gallbladder wall thickening and pericholecystic fluid along with fever 10/19/24.     Recommendations:  - agree with infectious work-up per primary team, though lack of leukocytosis and exam findings are reassuring   - agree with supportive oncology consult  - f/u with Dr. Hu 10/23 for tentative C5     Thank you for this consult, we will sign-off. Patient seen and discussed with attending physician, Dr. Rodriguez, who agrees with the above.     Yogesh Mancini MD  Hematology-Oncology Fellow, PGY5  Hematology Consult Pager: [phone]  Oncology Consult Pager: [phone]

## 2024-10-19 NOTE — CARE PLAN
Went through all beloning - list in the chart   Came for pain in RUQ   Did admission -  IV access -   Four eye with chacorta   Just tattoes

## 2024-10-20 VITALS
DIASTOLIC BLOOD PRESSURE: 80 MMHG | HEIGHT: 66 IN | OXYGEN SATURATION: 97 % | WEIGHT: 145 LBS | RESPIRATION RATE: 18 BRPM | HEART RATE: 98 BPM | BODY MASS INDEX: 23.3 KG/M2 | SYSTOLIC BLOOD PRESSURE: 119 MMHG | TEMPERATURE: 97.5 F

## 2024-10-20 LAB
ALBUMIN SERPL BCP-MCNC: 3.1 G/DL (ref 3.4–5)
ALP SERPL-CCNC: 513 U/L (ref 33–136)
ALT SERPL W P-5'-P-CCNC: 17 U/L (ref 7–45)
ANION GAP SERPL CALC-SCNC: 16 MMOL/L (ref 10–20)
AST SERPL W P-5'-P-CCNC: 54 U/L (ref 9–39)
BACTERIA BLD CULT: NORMAL
BACTERIA BLD CULT: NORMAL
BASOPHILS # BLD AUTO: 0.04 X10*3/UL (ref 0–0.1)
BASOPHILS NFR BLD AUTO: 0.6 %
BILIRUB DIRECT SERPL-MCNC: 0.2 MG/DL (ref 0–0.3)
BILIRUB SERPL-MCNC: 0.5 MG/DL (ref 0–1.2)
BUN SERPL-MCNC: 8 MG/DL (ref 6–23)
CALCIUM SERPL-MCNC: 8.9 MG/DL (ref 8.6–10.6)
CHLORIDE SERPL-SCNC: 100 MMOL/L (ref 98–107)
CO2 SERPL-SCNC: 25 MMOL/L (ref 21–32)
CREAT SERPL-MCNC: 0.81 MG/DL (ref 0.5–1.05)
EGFRCR SERPLBLD CKD-EPI 2021: 80 ML/MIN/1.73M*2
EOSINOPHIL # BLD AUTO: 0.01 X10*3/UL (ref 0–0.7)
EOSINOPHIL NFR BLD AUTO: 0.2 %
ERYTHROCYTE [DISTWIDTH] IN BLOOD BY AUTOMATED COUNT: 20.5 % (ref 11.5–14.5)
GLUCOSE SERPL-MCNC: 134 MG/DL (ref 74–99)
HCT VFR BLD AUTO: 29 % (ref 36–46)
HGB BLD-MCNC: 9.2 G/DL (ref 12–16)
IMM GRANULOCYTES # BLD AUTO: 0.07 X10*3/UL (ref 0–0.7)
IMM GRANULOCYTES NFR BLD AUTO: 1.1 % (ref 0–0.9)
LYMPHOCYTES # BLD AUTO: 0.64 X10*3/UL (ref 1.2–4.8)
LYMPHOCYTES NFR BLD AUTO: 10.1 %
MAGNESIUM SERPL-MCNC: 1.63 MG/DL (ref 1.6–2.4)
MCH RBC QN AUTO: 28.6 PG (ref 26–34)
MCHC RBC AUTO-ENTMCNC: 31.7 G/DL (ref 32–36)
MCV RBC AUTO: 90 FL (ref 80–100)
MONOCYTES # BLD AUTO: 0.88 X10*3/UL (ref 0.1–1)
MONOCYTES NFR BLD AUTO: 13.8 %
NEUTROPHILS # BLD AUTO: 4.72 X10*3/UL (ref 1.2–7.7)
NEUTROPHILS NFR BLD AUTO: 74.2 %
NRBC BLD-RTO: 0 /100 WBCS (ref 0–0)
PHOSPHATE SERPL-MCNC: 2.8 MG/DL (ref 2.5–4.9)
PLATELET # BLD AUTO: 246 X10*3/UL (ref 150–450)
POTASSIUM SERPL-SCNC: 3.3 MMOL/L (ref 3.5–5.3)
PROT SERPL-MCNC: 6.3 G/DL (ref 6.4–8.2)
RBC # BLD AUTO: 3.22 X10*6/UL (ref 4–5.2)
RBC MORPH BLD: NORMAL
SCHISTOCYTES BLD QL SMEAR: NORMAL
SODIUM SERPL-SCNC: 138 MMOL/L (ref 136–145)
WBC # BLD AUTO: 6.4 X10*3/UL (ref 4.4–11.3)

## 2024-10-20 PROCEDURE — 2500000004 HC RX 250 GENERAL PHARMACY W/ HCPCS (ALT 636 FOR OP/ED)

## 2024-10-20 PROCEDURE — 84100 ASSAY OF PHOSPHORUS: CPT

## 2024-10-20 PROCEDURE — 83735 ASSAY OF MAGNESIUM: CPT

## 2024-10-20 PROCEDURE — 85025 COMPLETE CBC W/AUTO DIFF WBC: CPT

## 2024-10-20 PROCEDURE — 2500000001 HC RX 250 WO HCPCS SELF ADMINISTERED DRUGS (ALT 637 FOR MEDICARE OP)

## 2024-10-20 PROCEDURE — 99239 HOSP IP/OBS DSCHRG MGMT >30: CPT

## 2024-10-20 PROCEDURE — 2500000002 HC RX 250 W HCPCS SELF ADMINISTERED DRUGS (ALT 637 FOR MEDICARE OP, ALT 636 FOR OP/ED)

## 2024-10-20 PROCEDURE — 80048 BASIC METABOLIC PNL TOTAL CA: CPT

## 2024-10-20 PROCEDURE — 82248 BILIRUBIN DIRECT: CPT

## 2024-10-20 RX ORDER — LANOLIN ALCOHOL/MO/W.PET/CERES
800 CREAM (GRAM) TOPICAL ONCE
Status: COMPLETED | OUTPATIENT
Start: 2024-10-20 | End: 2024-10-20

## 2024-10-20 RX ORDER — CIPROFLOXACIN 500 MG/1
500 TABLET ORAL 2 TIMES DAILY
Qty: 12 TABLET | Refills: 0 | Status: SHIPPED | OUTPATIENT
Start: 2024-10-20 | End: 2024-10-26

## 2024-10-20 RX ORDER — POTASSIUM CHLORIDE 20 MEQ/1
40 TABLET, EXTENDED RELEASE ORAL ONCE
Status: COMPLETED | OUTPATIENT
Start: 2024-10-20 | End: 2024-10-20

## 2024-10-20 RX ORDER — MAGNESIUM SULFATE HEPTAHYDRATE 40 MG/ML
2 INJECTION, SOLUTION INTRAVENOUS ONCE
Status: DISCONTINUED | OUTPATIENT
Start: 2024-10-20 | End: 2024-10-20

## 2024-10-20 RX ORDER — METRONIDAZOLE 500 MG/1
500 TABLET ORAL 3 TIMES DAILY
Qty: 18 TABLET | Refills: 0 | Status: SHIPPED | OUTPATIENT
Start: 2024-10-20 | End: 2024-10-26

## 2024-10-20 RX ADMIN — PIPERACILLIN SODIUM AND TAZOBACTAM SODIUM 4.5 G: 4; .5 INJECTION, SOLUTION INTRAVENOUS at 04:19

## 2024-10-20 RX ADMIN — ALLOPURINOL 100 MG: 100 TABLET ORAL at 08:18

## 2024-10-20 RX ADMIN — Medication 800 MG: at 11:40

## 2024-10-20 RX ADMIN — PANTOPRAZOLE SODIUM 20 MG: 20 TABLET, DELAYED RELEASE ORAL at 08:18

## 2024-10-20 RX ADMIN — PIPERACILLIN SODIUM AND TAZOBACTAM SODIUM 4.5 G: 4; .5 INJECTION, SOLUTION INTRAVENOUS at 09:11

## 2024-10-20 RX ADMIN — CHOLECALCIFEROL (VITAMIN D3) 10 MCG (400 UNIT) TABLET 10 MCG: at 08:18

## 2024-10-20 RX ADMIN — POTASSIUM CHLORIDE 40 MEQ: 1500 TABLET, EXTENDED RELEASE ORAL at 11:40

## 2024-10-20 NOTE — CARE PLAN
The clinical goals for the shift include patient will be safe and have a restful night    Patient had a restful night with no issues or concern.

## 2024-10-20 NOTE — DISCHARGE INSTRUCTIONS
Dear Ms. Dodd,    It was a pleasure taking care of you at [facility]. You came to the emergency department because of right upper quadrant pain and an ultrasound concerning for acute cholecystitis. You had an elevated lab value called alkaline phosphatase. You had more imaging showing that the gallbladder wall was thickened. You also had fevers and sweating that have improved. You were started on antibiotics. The oncologist saw you in the hospital as well and would like you to follow-up with your oncologist next week.     Please take all your medications as prescribed. You will have two antibiotics: ciprofloxacin and metronidazole for another 6 days. Please complete the entire antibiotic course. Please ask your outpatient providers on adjusting your regular medications.     Please be on the look out for the following symptoms: additional fevers, chills, sweating, dizziness, lightheadedness, confusion, abdominal pain, nausea, vomiting. These could be signs that you are developing an infection. Please abstain from alcohol while you are taking metronidazole. Please monitor for diarrhea.     To follow-up:  [ ] Please follow-up with your PCP  [ ] Please follow-up with your oncologist outpatient next week - 10/23    Your  Care Team

## 2024-10-20 NOTE — DISCHARGE SUMMARY
Discharge Diagnosis  Right upper quadrant abdominal pain  Cholecystitis, acute  Metastatic colon cancer- mets to liver  HTN      Issues Requiring Follow-Up  [ ] Follow-up an infectious work-up pending   - Complete antibiotics outpatient  [ ] Follow-up with PCP as indicated  [ ] Follow-up with oncology on 10/23 for next steps regarding cancer care    Discharge Meds     Medication List      START taking these medications     ciprofloxacin 500 mg tablet; Commonly known as: Cipro; Take 1 tablet   (500 mg) by mouth 2 times a day for 6 days.   metroNIDAZOLE 500 mg tablet; Commonly known as: Flagyl; Take 1 tablet   (500 mg) by mouth 3 times a day for 6 days.     CONTINUE taking these medications     allopurinol 100 mg tablet; Commonly known as: Zyloprim   amLODIPine 5 mg tablet; Commonly known as: Norvasc   atorvastatin 20 mg tablet; Commonly known as: Lipitor   cholecalciferol 25 MCG (1000 UT) capsule; Commonly known as: Vitamin D-3   clindamycin 1 % lotion; Commonly known as: Cleocin T; Apply topically to   affected area twice daily.   leflunomide 10 mg tablet; Commonly known as: Arava   letrozole 2.5 mg tablet; Commonly known as: Femara   LORazepam 0.5 mg tablet; Commonly known as: Ativan; Take 1 tablet (0.5   mg) by mouth every 8 hours if needed for anxiety.   OLANZapine 5 mg tablet; Commonly known as: ZyPREXA; Take 1 tablet (5 mg)   by mouth once daily at bedtime. 1 tab at bedtime for 3 days, starting the   night of chemo   omeprazole 40 mg DR capsule; Commonly known as: PriLOSEC   ondansetron 8 mg tablet; Commonly known as: Zofran; Take 1 tablet (8 mg)   by mouth every 8 hours if needed for nausea or vomiting.   oxyCODONE 15 mg immediate release tablet; Commonly known as: Roxicodone;   Take 1 tablet (15 mg) by mouth every 6 hours if needed (pain). G89.3   prochlorperazine 10 mg tablet; Commonly known as: Compazine; Take 1   tablet (10 mg) by mouth every 6 hours if needed for nausea or vomiting.     ASK your doctor  about these medications     doxepin 10 mg capsule; Commonly known as: SINEquan; Take 1 capsule (10   mg) by mouth as needed at bedtime for sleep.   hydrocortisone 2.5 % cream; Apply topically 2 times a day. To area of   rash   minocycline 100 mg capsule; Take 1 capsule (100 mg) by mouth once every   24 hours. For rash prevention       Test Results Pending At Discharge  Pending Labs       Order Current Status    Extra Urine Gray Tube Collected (10/18/24 1208)    Urinalysis with Reflex Culture and Microscopic In process    Blood Culture Preliminary result    Blood Culture Preliminary result    CBC and Auto Differential Preliminary result          Hospital Course  Elana Ddod is a 68yo F with HTN, RA (on leflunomide), HLD ,GERD , s/p R nephrectomy (1998, donated kidney to sister), R breast cancer on letrozole (since 7/2021), gout and colon cancer with recurrent metastatic disease to the liver, who presented to the ED for RUQ pain and ultrasound at [facility] concerning for acute cholecystitis. Vitals notable for a fever to 100.8 with chills. Otherwise, hemodynamically stable. CBC without leukocytosis. CT A/P showed GB wall thickening with pericholecystic fluid and increased GB wall edema, which could reflect infiltration and inflammation of metastatic disease. There was an increase in size of hypodense lesions and perihepatic fluid since the last study. Bile ducts were not dilated. Surgical oncology consulted: less likely cholecystitis with no plan for cholecystectomy or percutaneous cholecystotomy tube. She was started empirically on Zosyn pending cultures. UA unremarkable. Blood cultures negative to date. Oncology consulted, who believed the pain was from tumor necrosis around the liver. Patient feeling better with less perspiration. She was discharged on ciprofloxacin and metronidazole.     Pertinent Physical Exam At Time of Discharge  Visit Vitals  /80   Pulse 98   Temp 36.4 °C (97.5 °F)   Resp 18   Ht 1.67 m  "(5' 5.75\")   Wt 65.8 kg (145 lb)   SpO2 97%   BMI 23.58 kg/m²   Smoking Status Former   BSA 1.75 m²   GEN: Awake, alert, no acute distress, perspiring but then stopped  HEAD: Normocephalic, atraumatic  CV: Normal S1 and S2, no murmurs, rubs, or gallops  PULM: CTA bilaterally  ABD: Soft, nondistended, nontender to palpation, including the RUQ  EXTREM: No peripheral edema bilaterally  NEURO: Grossly intact     Outpatient Follow-Up  Future Appointments   Date Time Provider Department Center   10/23/2024  8:00 AM INF 11 [address] [id number] [facility]   10/23/2024  9:20 AM Felipa Hu MD [id number] [facility]   10/30/2024  7:30 AM INF 11 [address] [id number] [facility]   10/30/2024 10:20 AM Felipa Hu MD [id number] [facility]   11/1/2024  2:00 PM INF 14 [address] [id number] [facility]   11/5/2024 11:30 AM Gege Medellin, APRN-CNP [id number] [facility]       Cindy Marcus MD  "

## 2024-10-22 ENCOUNTER — TELEPHONE (OUTPATIENT)
Dept: HEMATOLOGY/ONCOLOGY | Facility: CLINIC | Age: 69
End: 2024-10-22
Payer: COMMERCIAL

## 2024-10-22 NOTE — TELEPHONE ENCOUNTER
Informed Elana Olmos that there are no treatment spots open the rest of this week so we will keep her 10-30 spot.  She did a teachback.

## 2024-10-22 NOTE — TELEPHONE ENCOUNTER
Elana Olmos is scheduled for an exam visit with Dr. Hu tomorrow and lab draw.  Her Tx appt is scheduled for 10-30.  She missed her first treatment due to low magnesium.  She wants to know if it is okay to wait that long for her first treatment.  Message sent to Dr. Hu and her RN partner Silvia Stern.

## 2024-10-23 ENCOUNTER — INFUSION (OUTPATIENT)
Dept: HEMATOLOGY/ONCOLOGY | Facility: CLINIC | Age: 69
End: 2024-10-23
Payer: COMMERCIAL

## 2024-10-23 ENCOUNTER — APPOINTMENT (OUTPATIENT)
Dept: RADIOLOGY | Facility: CLINIC | Age: 69
End: 2024-10-23
Payer: COMMERCIAL

## 2024-10-23 ENCOUNTER — OFFICE VISIT (OUTPATIENT)
Dept: HEMATOLOGY/ONCOLOGY | Facility: CLINIC | Age: 69
End: 2024-10-23
Payer: COMMERCIAL

## 2024-10-23 VITALS
SYSTOLIC BLOOD PRESSURE: 143 MMHG | DIASTOLIC BLOOD PRESSURE: 82 MMHG | BODY MASS INDEX: 23.58 KG/M2 | WEIGHT: 145 LBS | RESPIRATION RATE: 16 BRPM | OXYGEN SATURATION: 100 % | TEMPERATURE: 97.7 F | HEART RATE: 77 BPM

## 2024-10-23 VITALS
WEIGHT: 145.17 LBS | DIASTOLIC BLOOD PRESSURE: 78 MMHG | HEART RATE: 92 BPM | OXYGEN SATURATION: 99 % | TEMPERATURE: 97 F | RESPIRATION RATE: 18 BRPM | SYSTOLIC BLOOD PRESSURE: 133 MMHG | BODY MASS INDEX: 23.61 KG/M2

## 2024-10-23 DIAGNOSIS — C78.7 METASTATIC COLON CANCER TO LIVER (MULTI): ICD-10-CM

## 2024-10-23 DIAGNOSIS — C18.9 MALIGNANT NEOPLASM OF COLON, UNSPECIFIED PART OF COLON (MULTI): Primary | ICD-10-CM

## 2024-10-23 DIAGNOSIS — C18.9 METASTATIC COLON CANCER TO LIVER (MULTI): ICD-10-CM

## 2024-10-23 DIAGNOSIS — C18.9 MALIGNANT NEOPLASM OF COLON, UNSPECIFIED PART OF COLON (MULTI): ICD-10-CM

## 2024-10-23 DIAGNOSIS — F41.1 GENERALIZED ANXIETY DISORDER: ICD-10-CM

## 2024-10-23 LAB
ALBUMIN SERPL BCP-MCNC: 3.7 G/DL (ref 3.4–5)
ALP SERPL-CCNC: 375 U/L (ref 33–136)
ALT SERPL W P-5'-P-CCNC: 25 U/L (ref 7–45)
ANION GAP SERPL CALC-SCNC: 19 MMOL/L (ref 10–20)
AST SERPL W P-5'-P-CCNC: 86 U/L (ref 9–39)
BACTERIA BLD CULT: NORMAL
BACTERIA BLD CULT: NORMAL
BASOPHILS # BLD AUTO: 0.05 X10*3/UL (ref 0–0.1)
BASOPHILS NFR BLD AUTO: 0.6 %
BILIRUB SERPL-MCNC: 0.5 MG/DL (ref 0–1.2)
BUN SERPL-MCNC: 12 MG/DL (ref 6–23)
CALCIUM SERPL-MCNC: 9.4 MG/DL (ref 8.6–10.3)
CHLORIDE SERPL-SCNC: 102 MMOL/L (ref 98–107)
CO2 SERPL-SCNC: 21 MMOL/L (ref 21–32)
CREAT SERPL-MCNC: 0.83 MG/DL (ref 0.5–1.05)
EGFRCR SERPLBLD CKD-EPI 2021: 77 ML/MIN/1.73M*2
EOSINOPHIL # BLD AUTO: 0.1 X10*3/UL (ref 0–0.7)
EOSINOPHIL NFR BLD AUTO: 1.2 %
ERYTHROCYTE [DISTWIDTH] IN BLOOD BY AUTOMATED COUNT: 20.5 % (ref 11.5–14.5)
GIANT PLATELETS BLD QL SMEAR: NORMAL
GLUCOSE SERPL-MCNC: 120 MG/DL (ref 74–99)
HCT VFR BLD AUTO: 32.1 % (ref 36–46)
HGB BLD-MCNC: 10.1 G/DL (ref 12–16)
IMM GRANULOCYTES # BLD AUTO: 0.05 X10*3/UL (ref 0–0.7)
IMM GRANULOCYTES NFR BLD AUTO: 0.6 % (ref 0–0.9)
LYMPHOCYTES # BLD AUTO: 1.06 X10*3/UL (ref 1.2–4.8)
LYMPHOCYTES NFR BLD AUTO: 13 %
MAGNESIUM SERPL-MCNC: 1.44 MG/DL (ref 1.6–2.4)
MCH RBC QN AUTO: 28.7 PG (ref 26–34)
MCHC RBC AUTO-ENTMCNC: 31.5 G/DL (ref 32–36)
MCV RBC AUTO: 91 FL (ref 80–100)
MONOCYTES # BLD AUTO: 1.03 X10*3/UL (ref 0.1–1)
MONOCYTES NFR BLD AUTO: 12.6 %
NEUTROPHILS # BLD AUTO: 5.88 X10*3/UL (ref 1.2–7.7)
NEUTROPHILS NFR BLD AUTO: 72 %
NRBC BLD-RTO: 0 /100 WBCS (ref 0–0)
OVALOCYTES BLD QL SMEAR: NORMAL
PLATELET # BLD AUTO: 311 X10*3/UL (ref 150–450)
POLYCHROMASIA BLD QL SMEAR: NORMAL
POTASSIUM SERPL-SCNC: 3.7 MMOL/L (ref 3.5–5.3)
PROT SERPL-MCNC: 7.3 G/DL (ref 6.4–8.2)
RBC # BLD AUTO: 3.52 X10*6/UL (ref 4–5.2)
RBC MORPH BLD: NORMAL
SCHISTOCYTES BLD QL SMEAR: NORMAL
SODIUM SERPL-SCNC: 138 MMOL/L (ref 136–145)
SPHEROCYTES BLD QL SMEAR: NORMAL
TOXIC GRANULES BLD QL SMEAR: PRESENT
WBC # BLD AUTO: 8.2 X10*3/UL (ref 4.4–11.3)

## 2024-10-23 PROCEDURE — 83735 ASSAY OF MAGNESIUM: CPT

## 2024-10-23 PROCEDURE — 1125F AMNT PAIN NOTED PAIN PRSNT: CPT | Performed by: STUDENT IN AN ORGANIZED HEALTH CARE EDUCATION/TRAINING PROGRAM

## 2024-10-23 PROCEDURE — 2500000004 HC RX 250 GENERAL PHARMACY W/ HCPCS (ALT 636 FOR OP/ED): Performed by: STUDENT IN AN ORGANIZED HEALTH CARE EDUCATION/TRAINING PROGRAM

## 2024-10-23 PROCEDURE — 99214 OFFICE O/P EST MOD 30 MIN: CPT | Performed by: STUDENT IN AN ORGANIZED HEALTH CARE EDUCATION/TRAINING PROGRAM

## 2024-10-23 PROCEDURE — 85025 COMPLETE CBC W/AUTO DIFF WBC: CPT

## 2024-10-23 PROCEDURE — 1111F DSCHRG MED/CURRENT MED MERGE: CPT | Performed by: STUDENT IN AN ORGANIZED HEALTH CARE EDUCATION/TRAINING PROGRAM

## 2024-10-23 PROCEDURE — 1159F MED LIST DOCD IN RCRD: CPT | Performed by: STUDENT IN AN ORGANIZED HEALTH CARE EDUCATION/TRAINING PROGRAM

## 2024-10-23 PROCEDURE — 36591 DRAW BLOOD OFF VENOUS DEVICE: CPT

## 2024-10-23 PROCEDURE — 80053 COMPREHEN METABOLIC PANEL: CPT

## 2024-10-23 RX ORDER — HEPARIN SODIUM,PORCINE/PF 10 UNIT/ML
50 SYRINGE (ML) INTRAVENOUS AS NEEDED
OUTPATIENT
Start: 2024-10-23

## 2024-10-23 RX ORDER — HEPARIN SODIUM,PORCINE/PF 10 UNIT/ML
50 SYRINGE (ML) INTRAVENOUS AS NEEDED
Status: DISCONTINUED | OUTPATIENT
Start: 2024-10-23 | End: 2024-10-23 | Stop reason: HOSPADM

## 2024-10-23 RX ORDER — ESCITALOPRAM OXALATE 10 MG/1
10 TABLET ORAL DAILY
Qty: 30 TABLET | Refills: 2 | Status: SHIPPED | OUTPATIENT
Start: 2024-10-23 | End: 2025-01-21

## 2024-10-23 RX ORDER — YOHIMBE BARK 500 MG
CAPSULE ORAL
COMMUNITY

## 2024-10-23 RX ORDER — HEPARIN 100 UNIT/ML
500 SYRINGE INTRAVENOUS AS NEEDED
Status: DISCONTINUED | OUTPATIENT
Start: 2024-10-23 | End: 2024-10-23 | Stop reason: HOSPADM

## 2024-10-23 RX ORDER — HEPARIN 100 UNIT/ML
500 SYRINGE INTRAVENOUS AS NEEDED
OUTPATIENT
Start: 2024-10-23

## 2024-10-23 RX ORDER — ACETYLCYSTEINE 600 MG
CAPSULE ORAL
COMMUNITY

## 2024-10-23 ASSESSMENT — PAIN SCALES - GENERAL
PAINLEVEL_OUTOF10: 3
PAINLEVEL_OUTOF10: 6

## 2024-10-23 NOTE — PROGRESS NOTES
[facility]   GI Medical Oncology Clinic  Follow-Up Patient Visit    Patient Name: Elana Dodd  MRN: [id number]  Date of Service: 10/23/24  PCP: Rafat Mcclendon MD    Diagnosis: recurrent colon adenocarcinoma with metastatic disease to the liver   MMR: proficient  NGS: Caris: BRYANT, mutations in DACH1, SETD2, TP53, APC  Tempus xF: TP53, APC, TMB 8.6, BRYANT   CEA: 44.2 (6/3/24), 67.8 (7/31/24)  CA 19-9: 178 (6/3/24)     Oncologic History:     Ms. Elana Dodd is a 70 yo F with PMH of HTN, RA (on leflunomide), HLD, GERD, s/p R nephrectomy (1998, donated kidney to sister), R breast cancer (8/20/20, ER+/CT+/HER2 1+ by IHC/AMARIS+) s/p R lumpectomy and R SLN (11/5/2020) followed by carboplatin/Taxotere/Herceptin x4C (stopped due to SE) and R RT (completed 7/2021) on letrozole (since 7/2021), and stage IIA colon cancer s/p laparoscopic sigmoid colectomy and colostomy and en bloc resection with appendectomy and L ureterolysis (7/6/2020) with subsequent pelvic RT (completed 10/15/2020) lost to follow-up found to have recurrent metastatic colon cancer to the liver.     She was previously diagnosed and treated for her breast and colon cancers in [place].      6/23/2020 - PET/CT - hypermetabolic mass in sigmoid colon & hypermet mass in R breast      7/6/2020 - sigmoid colon resection - pathology: Moderately differentiated invasive adenocarcinoma, 5.5 cm in diameter. Invades through muscularis propria focally into subserosal tissue. Marked adhesion of small bowel segments to colon without involvement of malignancy, +0/28 LN, negative margins, small focus of carcinoma in the serosa of margin furthest from this tumor (pT3 N0), at least stage IIA     8/12/2020 - BL mammogram & US: 3.6 cm mass in R breast     8/20/2020 - R breast biopsy - path: Invasive poorly differentiated ductal carcinoma. ER positive and CT positive and HER-2 by IHC is 1+, but by HER-2 AMARIS is positive. On MammaPrint testing malignancy is ER and CT  positive and HER-2 positive, high risk, with a predicted 5-year metastasis free survival of 93% with chemotherapy and hormonal therapy versus 71% metastasis free survival with hormonal therapy alone.      10/15/2020 - Completed pelvic radiation      11/5/2020 - R lumpectomy and R SLNBx - path: 2.6 cm invasive ductal carcinoma, with all margins free of malignancy. The malignancy comes to within 1 mm of the closest deep margin. Perineural invasion is present. There is a focus of metastatic carcinoma, measuring 0.5 mm in 1 of 2 sentinel lymph nodes. pT2 pN1mic. Stage IIB     1/8/2021 - CT CAP - RLQ colostomy in tact, tiny nodules LLL nonspecific     5/11/2021 - CT CAP - stable small pulm nodules, worsening ventral hernia, interval takedown of LLQ ostomy, no evidence of met disease      Transitioned her care to her PCP where she was being prescribed letrozole. Has not had repeat mammogram or colonoscopy.     4/10/24 - Re-established care with medical oncology with Dr. Riccardo Blake at [facility]. Had not been seen by medical oncology since August 2021. Letrozole had been refilled by PCP. CT CAP, colonoscopy, CEA, CA 19-9, CA 27.29 were all ordered.      6/18/24 - CT A/P w contrast - large hepatic mass (7.5x8.7cm) involving both anterior segment of R lobe as well as medial segment of L lobe likely related to metastatic disease possibly colon cancer however met breast CA not excluded w hx; enlarged mohit hepatic LN likely metastatic, probably stable sidebranch IPMN and in pancreas   CT chest non con - stable tiny nodule superolateral LLL (3mm)     6/24/24 - Dr. Blake discussed imaging. Biopsy ordered.      7/1/24 - CT guided biopsy of liver lesion, path showed adenocarcinoma, metastatic from patient's known colonic primary, IHC +CK20, CDX2, SATB2 while negative for GATA3 and CK7, pMMR     1018-10/20/24 - Admitted to [facility] for RUQ pain with concern for cholecystitis. Surgical oncology consulted: less likely cholecystitis with no  plan for cholecystectomy or percutaneous cholecystotomy tube. She was started empirically on Zosyn pending cultures. Blood cultures negative. Oncology consulted, who believed the pain was from tumor necrosis around the liver. Patient feeling better with less perspiration. She was discharged on ciprofloxacin and metronidazole.     Current Treatment:    7/24 - supposed to start FOLFOX. Had issues with skin open over port line.    7/31/24 - present: FOLFOX + panitumumab (panitumumab added starting C3)     C4 - Stopped panitumumab per patient request as infusion time too long    Interval History:    Ms. Dodd is here today with her daughter, Nakia. She was hospitalized last week for concern for acute cholecystitis which was not ultimately the diagnosis. She still doesn't feel great. Tired. She denies any fevers sweats chills. She is not eating the best.    ROS:  10-pt ROS reviewed and negative except as mentioned above.     PMHx / FHx / PSHx: reviewed and are accurate    Medications: below was reviewed and is accurate    Current Outpatient Medications:     acetylcysteine (NAC) 600 mg capsule capsule, Take by mouth., Disp: , Rfl:     allopurinol (Zyloprim) 100 mg tablet, Take 2 tablets (200 mg) by mouth 2 times a day., Disp: , Rfl:     amLODIPine (Norvasc) 5 mg tablet, Take 1 tablet (5 mg) by mouth once daily., Disp: , Rfl:     atorvastatin (Lipitor) 20 mg tablet, Take 1 tablet (20 mg) by mouth once daily., Disp: , Rfl:     cholecalciferol (Vitamin D-3) 25 MCG (1000 UT) capsule, Take 1 capsule (25 mcg) by mouth once daily., Disp: , Rfl:     clindamycin (Cleocin T) 1 % lotion, Apply topically to affected area twice daily., Disp: 60 mL, Rfl: 3    doxepin (SINEquan) 10 mg capsule, Take 1 capsule (10 mg) by mouth as needed at bedtime for sleep., Disp: 30 capsule, Rfl: 0    leflunomide (Arava) 10 mg tablet, Take 1 tablet (10 mg) by mouth once daily., Disp: , Rfl:     letrozole (Femara) 2.5 mg tablet, Take 1 tablet (2.5 mg  total) by mouth once daily.  Take with or without food., Disp: , Rfl:     LORazepam (Ativan) 0.5 mg tablet, Take 1 tablet (0.5 mg) by mouth every 8 hours if needed for anxiety., Disp: 90 tablet, Rfl: 3    spring extract 500 mg capsule, Take by mouth., Disp: , Rfl:     minocycline 100 mg capsule, Take 1 capsule (100 mg) by mouth once every 24 hours. For rash prevention, Disp: 30 capsule, Rfl: 2    NON FORMULARY, , Disp: , Rfl:     OLANZapine (ZyPREXA) 5 mg tablet, Take 1 tablet (5 mg) by mouth once daily at bedtime. 1 tab at bedtime for 3 days, starting the night of chemo, Disp: 30 tablet, Rfl: 3    omeprazole (PriLOSEC) 40 mg DR capsule, Take 1 capsule (40 mg) by mouth once daily. Do not crush or chew., Disp: , Rfl:     ondansetron (Zofran) 8 mg tablet, Take 1 tablet (8 mg) by mouth every 8 hours if needed for nausea or vomiting., Disp: 30 tablet, Rfl: 5    oxyCODONE (Roxicodone) 15 mg immediate release tablet, Take 1 tablet (15 mg) by mouth every 6 hours if needed (pain). G89.3, Disp: 120 tablet, Rfl: 0    prochlorperazine (Compazine) 10 mg tablet, Take 1 tablet (10 mg) by mouth every 6 hours if needed for nausea or vomiting., Disp: 30 tablet, Rfl: 5    escitalopram (Lexapro) 10 mg tablet, Take 1 tablet (10 mg) by mouth once daily., Disp: 30 tablet, Rfl: 2    hydrocortisone 2.5 % cream, Apply topically 2 times a day. To area of rash (Patient not taking: Reported on 10/18/2024), Disp: 20 g, Rfl: 0    Physical exam:  Vitals:    10/23/24 0924   BP: 143/82   BP Location: Right arm   Patient Position: Sitting   BP Cuff Size: Adult long   Pulse: 77   Resp: 16   Temp: 36.5 °C (97.7 °F)   TempSrc: Temporal   SpO2: 100%   Weight: 65.8 kg (145 lb)   ECO  GEN: NAD, appears tired, tearful, anxious   LUNGS: normal respiratory effort  EXT: No deformities or edema  NEURO: Grossly intact. No focal deficits.  HEME: No signs of easy bruising or active bleeding.  PSYCH: Appropriate mood and affect    Laboratory review:    10/23/24  labs reviewed     ASSESSMENT AND PLAN:     Ms. Elana Dodd is a 68 yo F with PMH of HTN, RA (on leflunomide), HLD, GERD, s/p R nephrectomy (1998, donated kidney to sister), R breast cancer (8/20/20, ER+/[place]+/HER2 1+ by IHC/AMARIS+) s/p R lumpectomy and R SLN (11/5/2020) followed by carboplatin/Taxotere/Herceptin x4C (stopped due to SE) and R RT (completed 7/2021) on letrozole (since 7/2021), and stage IIA colon cancer s/p laparoscopic sigmoid colectomy and colostomy and en bloc resection with appendectomy and L ureterolysis (7/6/2020) with subsequent pelvic RT (completed 10/15/2020) lost to follow-up found to have recurrent metastatic colon cancer to the liver here today for follow-up.     Ms. Dodd has a lot of life stressors going on right now including the tragic passing of her sister. She is not feeling well today.     We had a long discussion today about the need to control her anxiety. It is causing significant loss of sleep and extreme fatigue. She is only taking Ativan. I had previously recommended a SSRI which she declined to take. I also recommended referral to psychotherapy. Nakia was present today and also encouraged patient to try a SSRI and also see a therapist. Patient agreeable today. Emphasized that the medication can take 2-3 weeks to work.     Metastatic colon cancer to the liver: RTC next week for C5   Anxiety: Severe. Hydroxyzine 20mg PRN and lorazepam 0.5mg PRN. Previously declined escitalopram. Willing to try today, so prescription sent to pharmacy. Referral to onco-psych. I also recommended she reach out to the [facility].    Constipation: Recommended taking senna and Miralax if taking oxycodone daily.   Nausea: Olanzapine nightly. Zofran and Compazine as needed.   Insomnia: Reiterated sleep hygiene - no screen time before bed. Olanzapine nightly. I do think this is from her uncontrolled anxiety.    Anemia: Next IV iron next treatment (for 2 total doses)    RTC in 1 week for C5  FOLFOX.    Felipa Hu MD  Staff, Gastrointestinal Medical Oncology  [facility]

## 2024-10-23 NOTE — LETTER
[phone] (phone)  [phone] (fax)       2024    To whom it may concern:     Elana Bosemarbin,  1955 missed her flight on 10/8/2024 to [place] due to complications while undergoing therapy for her cancer. She is currently not well enough to travel and it is unclear when or if she will be able to travel in the future.     Elana Bosemarbin   [phone]    Sincerely,           Dr. Leo Conces  NPI: [id number]

## 2024-10-23 NOTE — PROGRESS NOTES
Patient ambulated into clinic for follow up with Dr. Hu accompanied by her daughter Nakia. Medications and allergies reviewed.     Weakness  Fatigue  Generalized not feeling well.  Stated she is just feeling miserable today.     Daughter reported that Elana has been taking RADHA, adrenal support and NAC supplements. Dr. Hu aware.    Discharged from hospital on 10/20 on cipro BID for 6 days.    Reported everything tastes horrible.     Lexapro sent to pharmacy.     Follow up in one week prior to treatment.

## 2024-10-29 ENCOUNTER — INFUSION (OUTPATIENT)
Dept: HEMATOLOGY/ONCOLOGY | Facility: CLINIC | Age: 69
End: 2024-10-29
Payer: COMMERCIAL

## 2024-10-29 DIAGNOSIS — C18.9 MALIGNANT NEOPLASM OF COLON, UNSPECIFIED PART OF COLON (MULTI): ICD-10-CM

## 2024-10-29 DIAGNOSIS — C78.7 METASTATIC COLON CANCER TO LIVER (MULTI): ICD-10-CM

## 2024-10-29 DIAGNOSIS — C18.9 METASTATIC COLON CANCER TO LIVER (MULTI): ICD-10-CM

## 2024-10-29 LAB
ALBUMIN SERPL BCP-MCNC: 3.6 G/DL (ref 3.4–5)
ALP SERPL-CCNC: 322 U/L (ref 33–136)
ALT SERPL W P-5'-P-CCNC: 13 U/L (ref 7–45)
ANION GAP SERPL CALC-SCNC: 17 MMOL/L (ref 10–20)
AST SERPL W P-5'-P-CCNC: 47 U/L (ref 9–39)
BASOPHILS # BLD AUTO: 0.05 X10*3/UL (ref 0–0.1)
BASOPHILS NFR BLD AUTO: 0.4 %
BILIRUB SERPL-MCNC: 0.4 MG/DL (ref 0–1.2)
BITE CELLS BLD QL SMEAR: PRESENT
BUN SERPL-MCNC: 9 MG/DL (ref 6–23)
CALCIUM SERPL-MCNC: 9.1 MG/DL (ref 8.6–10.3)
CEA SERPL-MCNC: 36.7 UG/L
CHLORIDE SERPL-SCNC: 93 MMOL/L (ref 98–107)
CO2 SERPL-SCNC: 24 MMOL/L (ref 21–32)
CREAT SERPL-MCNC: 0.74 MG/DL (ref 0.5–1.05)
EGFRCR SERPLBLD CKD-EPI 2021: 89 ML/MIN/1.73M*2
EOSINOPHIL # BLD AUTO: 0.07 X10*3/UL (ref 0–0.7)
EOSINOPHIL NFR BLD AUTO: 0.5 %
ERYTHROCYTE [DISTWIDTH] IN BLOOD BY AUTOMATED COUNT: 21.1 % (ref 11.5–14.5)
GIANT PLATELETS BLD QL SMEAR: NORMAL
GLUCOSE SERPL-MCNC: 151 MG/DL (ref 74–99)
HCT VFR BLD AUTO: 30.2 % (ref 36–46)
HGB BLD-MCNC: 9.6 G/DL (ref 12–16)
IMM GRANULOCYTES # BLD AUTO: 0.07 X10*3/UL (ref 0–0.7)
IMM GRANULOCYTES NFR BLD AUTO: 0.5 % (ref 0–0.9)
LYMPHOCYTES # BLD AUTO: 0.89 X10*3/UL (ref 1.2–4.8)
LYMPHOCYTES NFR BLD AUTO: 6.6 %
MCH RBC QN AUTO: 29 PG (ref 26–34)
MCHC RBC AUTO-ENTMCNC: 31.8 G/DL (ref 32–36)
MCV RBC AUTO: 91 FL (ref 80–100)
MONOCYTES # BLD AUTO: 0.97 X10*3/UL (ref 0.1–1)
MONOCYTES NFR BLD AUTO: 7.2 %
NEUTROPHILS # BLD AUTO: 11.34 X10*3/UL (ref 1.2–7.7)
NEUTROPHILS NFR BLD AUTO: 84.8 %
NRBC BLD-RTO: 0 /100 WBCS (ref 0–0)
OVALOCYTES BLD QL SMEAR: NORMAL
PAPPENHEIMER BOD BLD QL SMEAR: PRESENT
PLATELET # BLD AUTO: 390 X10*3/UL (ref 150–450)
POLYCHROMASIA BLD QL SMEAR: NORMAL
POTASSIUM SERPL-SCNC: 3.2 MMOL/L (ref 3.5–5.3)
PROT SERPL-MCNC: 7 G/DL (ref 6.4–8.2)
RBC # BLD AUTO: 3.31 X10*6/UL (ref 4–5.2)
RBC MORPH BLD: NORMAL
SCHISTOCYTES BLD QL SMEAR: NORMAL
SODIUM SERPL-SCNC: 131 MMOL/L (ref 136–145)
TOXIC GRANULES BLD QL SMEAR: PRESENT
WBC # BLD AUTO: 13.4 X10*3/UL (ref 4.4–11.3)

## 2024-10-29 PROCEDURE — 80053 COMPREHEN METABOLIC PANEL: CPT

## 2024-10-29 PROCEDURE — 82378 CARCINOEMBRYONIC ANTIGEN: CPT

## 2024-10-29 PROCEDURE — 85025 COMPLETE CBC W/AUTO DIFF WBC: CPT

## 2024-10-29 PROCEDURE — 36591 DRAW BLOOD OFF VENOUS DEVICE: CPT

## 2024-10-29 PROCEDURE — 83735 ASSAY OF MAGNESIUM: CPT

## 2024-10-29 NOTE — DOCUMENTATION CLARIFICATION NOTE
"    PATIENT:               SCARLETT SERRATO  ACCT #:                  [id number]  MRN:                       [id number]  :                       1955  ADMIT DATE:       10/18/2024 10:57 AM  DISCH DATE:        10/20/2024 1:07 PM  RESPONDING PROVIDER #:        [id number]          PROVIDER RESPONSE TEXT:    Abdominal pain on admit likely due to metastatic colon cancer    CDI QUERY TEXT:    Clarification        Instruction:    Based on your assessment of the patient and the clinical information, please provide the requested documentation by clicking on the appropriate radio button and enter any additional information if prompted.    Question: Please further clarify the most likely etiology of abdmonimal pain on admit on admission after final work up    When answering this query, please exercise your independent professional judgment. The fact that a question is being asked, does not imply that any particular answer is desired or expected.    The patient's clinical indicators include:  Clinical Information: Pt is a 69 yr old female with hx HTN, RA, kidney donor, R breast cancer, colon cancer with recurrent mets to liver who presented with abdominal pain. VS on admit - 36.4 - 98 - 18  119/80 pox RA - 97%    Clinical Indicators: RUQ abd pain. US outpt on 10/18 concerning for cholecystitis. Documentation from H and P \"RUQ pain - cancer related pain\"  Documentation from discharge summary shows \"CT A/P showed GB wall thickening with pericholecystic fluid and increased GB wall edema, which could reflect infiltration and inflammation of metastatic disease\" and \"Surgical oncology consulted: less likely cholecystitis\" and \"Oncology consulted, who believed the pain was from tumor necrosis around the liver. \"  also \"Discharge Diagnosis  Right upper quadrant abdominal pain  Cholecystitis, acute  Metastatic colon cancer- mets to liver  HTN\"    Treatment: Oncology consult, empirically started on Zosyn then changed to cipro and flagyl at " discharge.    Risk Factors: metastatic cancer, RA,  Options provided:  -- Abd pain on admit likely due to cholecystitis  -- Abdominal pain on admit likely due to metastatic colon cancer  -- Other - I will add my own diagnosis  -- Refer to Clinical Documentation Reviewer    Query created by: Bri Chavez on 10/29/2024 11:56 AM      Electronically signed by:  ROXANNE ANSARI MD MPH 10/29/2024 1:08 PM

## 2024-10-30 ENCOUNTER — OFFICE VISIT (OUTPATIENT)
Dept: HEMATOLOGY/ONCOLOGY | Facility: CLINIC | Age: 69
End: 2024-10-30
Payer: COMMERCIAL

## 2024-10-30 ENCOUNTER — INFUSION (OUTPATIENT)
Dept: HEMATOLOGY/ONCOLOGY | Facility: CLINIC | Age: 69
End: 2024-10-30
Payer: COMMERCIAL

## 2024-10-30 VITALS
WEIGHT: 145.61 LBS | OXYGEN SATURATION: 95 % | HEART RATE: 83 BPM | DIASTOLIC BLOOD PRESSURE: 85 MMHG | RESPIRATION RATE: 18 BRPM | BODY MASS INDEX: 23.68 KG/M2 | TEMPERATURE: 96.6 F | SYSTOLIC BLOOD PRESSURE: 124 MMHG

## 2024-10-30 VITALS
WEIGHT: 145.61 LBS | TEMPERATURE: 96.6 F | OXYGEN SATURATION: 95 % | SYSTOLIC BLOOD PRESSURE: 124 MMHG | BODY MASS INDEX: 23.68 KG/M2 | HEART RATE: 83 BPM | DIASTOLIC BLOOD PRESSURE: 85 MMHG | RESPIRATION RATE: 18 BRPM

## 2024-10-30 DIAGNOSIS — C18.9 MALIGNANT NEOPLASM OF COLON, UNSPECIFIED PART OF COLON (MULTI): Primary | ICD-10-CM

## 2024-10-30 DIAGNOSIS — D50.9 IRON DEFICIENCY ANEMIA, UNSPECIFIED IRON DEFICIENCY ANEMIA TYPE: ICD-10-CM

## 2024-10-30 DIAGNOSIS — C18.9 METASTATIC COLON CANCER TO LIVER (MULTI): ICD-10-CM

## 2024-10-30 DIAGNOSIS — C78.7 METASTATIC COLON CANCER TO LIVER (MULTI): ICD-10-CM

## 2024-10-30 DIAGNOSIS — C18.9 MALIGNANT NEOPLASM OF COLON, UNSPECIFIED PART OF COLON (MULTI): ICD-10-CM

## 2024-10-30 DIAGNOSIS — R53.83 FATIGUE, UNSPECIFIED TYPE: ICD-10-CM

## 2024-10-30 LAB
ALBUMIN SERPL BCP-MCNC: 3.4 G/DL (ref 3.4–5)
ALP SERPL-CCNC: 308 U/L (ref 33–136)
ALT SERPL W P-5'-P-CCNC: 12 U/L (ref 7–45)
ANION GAP SERPL CALC-SCNC: 16 MMOL/L (ref 10–20)
AST SERPL W P-5'-P-CCNC: 43 U/L (ref 9–39)
BASOPHILS # BLD AUTO: 0.06 X10*3/UL (ref 0–0.1)
BASOPHILS NFR BLD AUTO: 0.5 %
BILIRUB SERPL-MCNC: 0.4 MG/DL (ref 0–1.2)
BUN SERPL-MCNC: 10 MG/DL (ref 6–23)
CALCIUM SERPL-MCNC: 9.1 MG/DL (ref 8.6–10.3)
CHLORIDE SERPL-SCNC: 96 MMOL/L (ref 98–107)
CO2 SERPL-SCNC: 25 MMOL/L (ref 21–32)
CREAT SERPL-MCNC: 0.71 MG/DL (ref 0.5–1.05)
EGFRCR SERPLBLD CKD-EPI 2021: >90 ML/MIN/1.73M*2
EOSINOPHIL # BLD AUTO: 0.06 X10*3/UL (ref 0–0.7)
EOSINOPHIL NFR BLD AUTO: 0.5 %
ERYTHROCYTE [DISTWIDTH] IN BLOOD BY AUTOMATED COUNT: 20.6 % (ref 11.5–14.5)
FERRITIN SERPL-MCNC: 764 NG/ML (ref 8–150)
GLUCOSE SERPL-MCNC: 107 MG/DL (ref 74–99)
HCT VFR BLD AUTO: 28.1 % (ref 36–46)
HGB BLD-MCNC: 8.9 G/DL (ref 12–16)
IMM GRANULOCYTES # BLD AUTO: 0.05 X10*3/UL (ref 0–0.7)
IMM GRANULOCYTES NFR BLD AUTO: 0.4 % (ref 0–0.9)
IRON SATN MFR SERPL: 27 % (ref 25–45)
IRON SERPL-MCNC: 47 UG/DL (ref 35–150)
LYMPHOCYTES # BLD AUTO: 0.67 X10*3/UL (ref 1.2–4.8)
LYMPHOCYTES NFR BLD AUTO: 5.1 %
MAGNESIUM SERPL-MCNC: 1.32 MG/DL (ref 1.6–2.4)
MCH RBC QN AUTO: 29.1 PG (ref 26–34)
MCHC RBC AUTO-ENTMCNC: 31.7 G/DL (ref 32–36)
MCV RBC AUTO: 92 FL (ref 80–100)
MONOCYTES # BLD AUTO: 1.11 X10*3/UL (ref 0.1–1)
MONOCYTES NFR BLD AUTO: 8.4 %
NEUTROPHILS # BLD AUTO: 11.23 X10*3/UL (ref 1.2–7.7)
NEUTROPHILS NFR BLD AUTO: 85.1 %
NRBC BLD-RTO: 0 /100 WBCS (ref 0–0)
PLATELET # BLD AUTO: 338 X10*3/UL (ref 150–450)
POTASSIUM SERPL-SCNC: 3.1 MMOL/L (ref 3.5–5.3)
PROT SERPL-MCNC: 6.7 G/DL (ref 6.4–8.2)
RBC # BLD AUTO: 3.06 X10*6/UL (ref 4–5.2)
SODIUM SERPL-SCNC: 134 MMOL/L (ref 136–145)
TIBC SERPL-MCNC: 171 UG/DL (ref 240–445)
TSH SERPL-ACNC: 3.56 MIU/L (ref 0.44–3.98)
UIBC SERPL-MCNC: 124 UG/DL (ref 110–370)
WBC # BLD AUTO: 13.2 X10*3/UL (ref 4.4–11.3)

## 2024-10-30 PROCEDURE — 80053 COMPREHEN METABOLIC PANEL: CPT

## 2024-10-30 PROCEDURE — 99215 OFFICE O/P EST HI 40 MIN: CPT | Performed by: STUDENT IN AN ORGANIZED HEALTH CARE EDUCATION/TRAINING PROGRAM

## 2024-10-30 PROCEDURE — 96367 TX/PROPH/DG ADDL SEQ IV INF: CPT

## 2024-10-30 PROCEDURE — 2500000004 HC RX 250 GENERAL PHARMACY W/ HCPCS (ALT 636 FOR OP/ED): Performed by: STUDENT IN AN ORGANIZED HEALTH CARE EDUCATION/TRAINING PROGRAM

## 2024-10-30 PROCEDURE — 96375 TX/PRO/DX INJ NEW DRUG ADDON: CPT | Mod: INF

## 2024-10-30 PROCEDURE — 96415 CHEMO IV INFUSION ADDL HR: CPT

## 2024-10-30 PROCEDURE — 84443 ASSAY THYROID STIM HORMONE: CPT

## 2024-10-30 PROCEDURE — 96413 CHEMO IV INFUSION 1 HR: CPT

## 2024-10-30 PROCEDURE — 96368 THER/DIAG CONCURRENT INF: CPT

## 2024-10-30 PROCEDURE — 2500000002 HC RX 250 W HCPCS SELF ADMINISTERED DRUGS (ALT 637 FOR MEDICARE OP, ALT 636 FOR OP/ED): Mod: MUE | Performed by: STUDENT IN AN ORGANIZED HEALTH CARE EDUCATION/TRAINING PROGRAM

## 2024-10-30 PROCEDURE — 82728 ASSAY OF FERRITIN: CPT

## 2024-10-30 PROCEDURE — 1111F DSCHRG MED/CURRENT MED MERGE: CPT | Performed by: STUDENT IN AN ORGANIZED HEALTH CARE EDUCATION/TRAINING PROGRAM

## 2024-10-30 PROCEDURE — 1126F AMNT PAIN NOTED NONE PRSNT: CPT | Performed by: STUDENT IN AN ORGANIZED HEALTH CARE EDUCATION/TRAINING PROGRAM

## 2024-10-30 PROCEDURE — 96416 CHEMO PROLONG INFUSE W/PUMP: CPT

## 2024-10-30 PROCEDURE — 1159F MED LIST DOCD IN RCRD: CPT | Performed by: STUDENT IN AN ORGANIZED HEALTH CARE EDUCATION/TRAINING PROGRAM

## 2024-10-30 PROCEDURE — 83550 IRON BINDING TEST: CPT

## 2024-10-30 PROCEDURE — 85025 COMPLETE CBC W/AUTO DIFF WBC: CPT

## 2024-10-30 RX ORDER — POTASSIUM CHLORIDE 20 MEQ/1
40 TABLET, EXTENDED RELEASE ORAL ONCE
Status: CANCELLED | OUTPATIENT
Start: 2024-10-30 | End: 2024-10-30

## 2024-10-30 RX ORDER — PROCHLORPERAZINE MALEATE 10 MG
10 TABLET ORAL EVERY 6 HOURS PRN
Status: DISCONTINUED | OUTPATIENT
Start: 2024-10-30 | End: 2024-10-30 | Stop reason: HOSPADM

## 2024-10-30 RX ORDER — EPINEPHRINE 0.3 MG/.3ML
0.3 INJECTION SUBCUTANEOUS EVERY 5 MIN PRN
Status: CANCELLED | OUTPATIENT
Start: 2024-10-30

## 2024-10-30 RX ORDER — DIPHENHYDRAMINE HYDROCHLORIDE 50 MG/ML
50 INJECTION INTRAMUSCULAR; INTRAVENOUS AS NEEDED
Status: DISCONTINUED | OUTPATIENT
Start: 2024-10-30 | End: 2024-10-30 | Stop reason: HOSPADM

## 2024-10-30 RX ORDER — PALONOSETRON 0.05 MG/ML
0.25 INJECTION, SOLUTION INTRAVENOUS ONCE
Status: COMPLETED | OUTPATIENT
Start: 2024-10-30 | End: 2024-10-30

## 2024-10-30 RX ORDER — FAMOTIDINE 10 MG/ML
20 INJECTION INTRAVENOUS ONCE AS NEEDED
OUTPATIENT
Start: 2024-10-30

## 2024-10-30 RX ORDER — DEXAMETHASONE 6 MG/1
12 TABLET ORAL ONCE
Status: COMPLETED | OUTPATIENT
Start: 2024-10-30 | End: 2024-10-30

## 2024-10-30 RX ORDER — PALONOSETRON 0.05 MG/ML
0.25 INJECTION, SOLUTION INTRAVENOUS ONCE
Status: CANCELLED | OUTPATIENT
Start: 2024-10-30

## 2024-10-30 RX ORDER — EPINEPHRINE 0.3 MG/.3ML
0.3 INJECTION SUBCUTANEOUS EVERY 5 MIN PRN
OUTPATIENT
Start: 2024-10-30

## 2024-10-30 RX ORDER — FAMOTIDINE 10 MG/ML
20 INJECTION INTRAVENOUS ONCE AS NEEDED
Status: CANCELLED | OUTPATIENT
Start: 2024-10-30

## 2024-10-30 RX ORDER — ALBUTEROL SULFATE 0.83 MG/ML
3 SOLUTION RESPIRATORY (INHALATION) AS NEEDED
Status: DISCONTINUED | OUTPATIENT
Start: 2024-10-30 | End: 2024-10-30 | Stop reason: HOSPADM

## 2024-10-30 RX ORDER — POTASSIUM CHLORIDE 20 MEQ/1
40 TABLET, EXTENDED RELEASE ORAL ONCE
Status: COMPLETED | OUTPATIENT
Start: 2024-10-30 | End: 2024-10-30

## 2024-10-30 RX ORDER — DEXAMETHASONE 6 MG/1
12 TABLET ORAL ONCE
Status: CANCELLED | OUTPATIENT
Start: 2024-10-30 | End: 2024-10-30

## 2024-10-30 RX ORDER — DIPHENHYDRAMINE HYDROCHLORIDE 50 MG/ML
50 INJECTION INTRAMUSCULAR; INTRAVENOUS AS NEEDED
Status: CANCELLED | OUTPATIENT
Start: 2024-10-30

## 2024-10-30 RX ORDER — FAMOTIDINE 10 MG/ML
20 INJECTION INTRAVENOUS ONCE AS NEEDED
Status: DISCONTINUED | OUTPATIENT
Start: 2024-10-30 | End: 2024-10-30 | Stop reason: HOSPADM

## 2024-10-30 RX ORDER — MAGNESIUM SULFATE HEPTAHYDRATE 40 MG/ML
2 INJECTION, SOLUTION INTRAVENOUS ONCE AS NEEDED
Status: DISCONTINUED | OUTPATIENT
Start: 2024-10-30 | End: 2024-10-30 | Stop reason: HOSPADM

## 2024-10-30 RX ORDER — EPINEPHRINE 0.3 MG/.3ML
0.3 INJECTION SUBCUTANEOUS EVERY 5 MIN PRN
Status: DISCONTINUED | OUTPATIENT
Start: 2024-10-30 | End: 2024-10-30 | Stop reason: HOSPADM

## 2024-10-30 RX ORDER — ALBUTEROL SULFATE 0.83 MG/ML
3 SOLUTION RESPIRATORY (INHALATION) AS NEEDED
OUTPATIENT
Start: 2024-10-30

## 2024-10-30 RX ORDER — MAGNESIUM SULFATE HEPTAHYDRATE 40 MG/ML
4 INJECTION, SOLUTION INTRAVENOUS ONCE AS NEEDED
Status: CANCELLED | OUTPATIENT
Start: 2024-10-30

## 2024-10-30 RX ORDER — DIPHENHYDRAMINE HYDROCHLORIDE 50 MG/ML
50 INJECTION INTRAMUSCULAR; INTRAVENOUS AS NEEDED
OUTPATIENT
Start: 2024-10-30

## 2024-10-30 RX ORDER — MAGNESIUM SULFATE HEPTAHYDRATE 40 MG/ML
2 INJECTION, SOLUTION INTRAVENOUS ONCE AS NEEDED
Status: CANCELLED | OUTPATIENT
Start: 2024-10-30

## 2024-10-30 RX ORDER — LORAZEPAM 2 MG/ML
1 INJECTION INTRAMUSCULAR AS NEEDED
Status: DISCONTINUED | OUTPATIENT
Start: 2024-10-30 | End: 2024-10-30 | Stop reason: HOSPADM

## 2024-10-30 RX ORDER — LORAZEPAM 2 MG/ML
1 INJECTION INTRAMUSCULAR AS NEEDED
Status: CANCELLED | OUTPATIENT
Start: 2024-10-30

## 2024-10-30 RX ORDER — PROCHLORPERAZINE EDISYLATE 5 MG/ML
10 INJECTION INTRAMUSCULAR; INTRAVENOUS EVERY 6 HOURS PRN
Status: CANCELLED | OUTPATIENT
Start: 2024-10-30

## 2024-10-30 RX ORDER — ALBUTEROL SULFATE 0.83 MG/ML
3 SOLUTION RESPIRATORY (INHALATION) AS NEEDED
Status: CANCELLED | OUTPATIENT
Start: 2024-10-30

## 2024-10-30 RX ORDER — PROCHLORPERAZINE MALEATE 10 MG
10 TABLET ORAL EVERY 6 HOURS PRN
Status: CANCELLED | OUTPATIENT
Start: 2024-10-30

## 2024-10-30 RX ORDER — PROCHLORPERAZINE EDISYLATE 5 MG/ML
10 INJECTION INTRAMUSCULAR; INTRAVENOUS EVERY 6 HOURS PRN
Status: DISCONTINUED | OUTPATIENT
Start: 2024-10-30 | End: 2024-10-30 | Stop reason: HOSPADM

## 2024-10-30 ASSESSMENT — PAIN SCALES - GENERAL
PAINLEVEL_OUTOF10: 0-NO PAIN
PAINLEVEL_OUTOF10: 0-NO PAIN

## 2024-10-31 ENCOUNTER — TELEPHONE (OUTPATIENT)
Dept: HEMATOLOGY/ONCOLOGY | Facility: CLINIC | Age: 69
End: 2024-10-31
Payer: COMMERCIAL

## 2024-10-31 NOTE — TELEPHONE ENCOUNTER
Called Nakia (daughter) to update her on how things are going. Went to  with no name listed. I stated my name and that Elana had said it was okay for me to give her a call. Didn't have her on the phone during visit yesterday so was following up today.     Felipa Hu MD  Staff, Gastrointestinal Medical Oncology  [facility]

## 2024-11-01 ENCOUNTER — INFUSION (OUTPATIENT)
Dept: HEMATOLOGY/ONCOLOGY | Facility: CLINIC | Age: 69
End: 2024-11-01
Payer: COMMERCIAL

## 2024-11-01 ENCOUNTER — TELEPHONE (OUTPATIENT)
Dept: HEMATOLOGY/ONCOLOGY | Facility: CLINIC | Age: 69
End: 2024-11-01

## 2024-11-01 ENCOUNTER — APPOINTMENT (OUTPATIENT)
Dept: HEMATOLOGY/ONCOLOGY | Facility: CLINIC | Age: 69
End: 2024-11-01
Payer: COMMERCIAL

## 2024-11-01 VITALS
HEART RATE: 78 BPM | DIASTOLIC BLOOD PRESSURE: 74 MMHG | OXYGEN SATURATION: 98 % | TEMPERATURE: 97 F | BODY MASS INDEX: 23.5 KG/M2 | WEIGHT: 144.51 LBS | SYSTOLIC BLOOD PRESSURE: 120 MMHG | RESPIRATION RATE: 18 BRPM

## 2024-11-01 DIAGNOSIS — C78.7 METASTATIC COLON CANCER TO LIVER (MULTI): ICD-10-CM

## 2024-11-01 DIAGNOSIS — C18.9 METASTATIC COLON CANCER TO LIVER (MULTI): ICD-10-CM

## 2024-11-01 PROCEDURE — 2500000004 HC RX 250 GENERAL PHARMACY W/ HCPCS (ALT 636 FOR OP/ED): Mod: SE | Performed by: STUDENT IN AN ORGANIZED HEALTH CARE EDUCATION/TRAINING PROGRAM

## 2024-11-01 PROCEDURE — 96523 IRRIG DRUG DELIVERY DEVICE: CPT

## 2024-11-01 RX ORDER — HEPARIN SODIUM,PORCINE/PF 10 UNIT/ML
50 SYRINGE (ML) INTRAVENOUS AS NEEDED
Status: DISCONTINUED | OUTPATIENT
Start: 2024-11-01 | End: 2024-11-01 | Stop reason: HOSPADM

## 2024-11-01 RX ORDER — ALBUTEROL SULFATE 0.83 MG/ML
3 SOLUTION RESPIRATORY (INHALATION) AS NEEDED
OUTPATIENT
Start: 2024-11-01

## 2024-11-01 RX ORDER — HEPARIN 100 UNIT/ML
500 SYRINGE INTRAVENOUS AS NEEDED
Status: DISCONTINUED | OUTPATIENT
Start: 2024-11-01 | End: 2024-11-01 | Stop reason: HOSPADM

## 2024-11-01 RX ORDER — EPINEPHRINE 0.3 MG/.3ML
0.3 INJECTION SUBCUTANEOUS EVERY 5 MIN PRN
OUTPATIENT
Start: 2024-11-01

## 2024-11-01 RX ORDER — HEPARIN SODIUM,PORCINE/PF 10 UNIT/ML
50 SYRINGE (ML) INTRAVENOUS AS NEEDED
OUTPATIENT
Start: 2024-11-01

## 2024-11-01 RX ORDER — HEPARIN 100 UNIT/ML
500 SYRINGE INTRAVENOUS AS NEEDED
OUTPATIENT
Start: 2024-11-01

## 2024-11-01 RX ORDER — FAMOTIDINE 10 MG/ML
20 INJECTION INTRAVENOUS ONCE AS NEEDED
OUTPATIENT
Start: 2024-11-01

## 2024-11-01 RX ORDER — DIPHENHYDRAMINE HYDROCHLORIDE 50 MG/ML
50 INJECTION INTRAMUSCULAR; INTRAVENOUS AS NEEDED
OUTPATIENT
Start: 2024-11-01

## 2024-11-01 ASSESSMENT — PAIN SCALES - GENERAL: PAINLEVEL_OUTOF10: 0-NO PAIN

## 2024-11-04 ENCOUNTER — APPOINTMENT (OUTPATIENT)
Dept: BEHAVIORAL HEALTH | Facility: CLINIC | Age: 69
End: 2024-11-04
Payer: COMMERCIAL

## 2024-11-05 ENCOUNTER — APPOINTMENT (OUTPATIENT)
Dept: PALLIATIVE MEDICINE | Facility: CLINIC | Age: 69
End: 2024-11-05
Payer: COMMERCIAL

## 2024-11-05 ENCOUNTER — TELEPHONE (OUTPATIENT)
Dept: HEMATOLOGY/ONCOLOGY | Facility: CLINIC | Age: 69
End: 2024-11-05
Payer: COMMERCIAL

## 2024-11-07 ENCOUNTER — APPOINTMENT (OUTPATIENT)
Dept: PRIMARY CARE | Facility: CLINIC | Age: 69
End: 2024-11-07
Payer: COMMERCIAL

## 2024-11-07 VITALS
DIASTOLIC BLOOD PRESSURE: 68 MMHG | HEART RATE: 77 BPM | SYSTOLIC BLOOD PRESSURE: 114 MMHG | OXYGEN SATURATION: 98 % | TEMPERATURE: 98 F | HEIGHT: 66 IN | BODY MASS INDEX: 23.3 KG/M2 | WEIGHT: 145 LBS

## 2024-11-07 DIAGNOSIS — F41.9 ANXIETY: Primary | ICD-10-CM

## 2024-11-07 DIAGNOSIS — R53.83 FATIGUE, UNSPECIFIED TYPE: ICD-10-CM

## 2024-11-07 DIAGNOSIS — C18.9 MALIGNANT NEOPLASM OF COLON, UNSPECIFIED PART OF COLON (MULTI): ICD-10-CM

## 2024-11-07 DIAGNOSIS — E87.6 HYPOKALEMIA: ICD-10-CM

## 2024-11-07 PROCEDURE — 1111F DSCHRG MED/CURRENT MED MERGE: CPT | Performed by: STUDENT IN AN ORGANIZED HEALTH CARE EDUCATION/TRAINING PROGRAM

## 2024-11-07 PROCEDURE — 1123F ACP DISCUSS/DSCN MKR DOCD: CPT | Performed by: STUDENT IN AN ORGANIZED HEALTH CARE EDUCATION/TRAINING PROGRAM

## 2024-11-07 PROCEDURE — 1126F AMNT PAIN NOTED NONE PRSNT: CPT | Performed by: STUDENT IN AN ORGANIZED HEALTH CARE EDUCATION/TRAINING PROGRAM

## 2024-11-07 PROCEDURE — 1158F ADVNC CARE PLAN TLK DOCD: CPT | Performed by: STUDENT IN AN ORGANIZED HEALTH CARE EDUCATION/TRAINING PROGRAM

## 2024-11-07 PROCEDURE — 99214 OFFICE O/P EST MOD 30 MIN: CPT | Performed by: STUDENT IN AN ORGANIZED HEALTH CARE EDUCATION/TRAINING PROGRAM

## 2024-11-07 PROCEDURE — 1159F MED LIST DOCD IN RCRD: CPT | Performed by: STUDENT IN AN ORGANIZED HEALTH CARE EDUCATION/TRAINING PROGRAM

## 2024-11-07 PROCEDURE — 3008F BODY MASS INDEX DOCD: CPT | Performed by: STUDENT IN AN ORGANIZED HEALTH CARE EDUCATION/TRAINING PROGRAM

## 2024-11-07 RX ORDER — DULOXETIN HYDROCHLORIDE 60 MG/1
60 CAPSULE, DELAYED RELEASE ORAL DAILY
Qty: 30 CAPSULE | Refills: 5 | Status: SHIPPED | OUTPATIENT
Start: 2024-11-07 | End: 2025-05-06

## 2024-11-07 RX ORDER — POTASSIUM CHLORIDE 750 MG/1
10 TABLET, FILM COATED, EXTENDED RELEASE ORAL DAILY
Qty: 30 TABLET | Refills: 11 | Status: SHIPPED | OUTPATIENT
Start: 2024-11-07 | End: 2025-11-07

## 2024-11-07 ASSESSMENT — ENCOUNTER SYMPTOMS
NERVOUS/ANXIOUS: 0
WHEEZING: 0
PALPITATIONS: 0
DYSPHORIC MOOD: 0
SHORTNESS OF BREATH: 0
WOUND: 0
DIZZINESS: 0
DYSURIA: 0
FEVER: 0
NAUSEA: 1
FREQUENCY: 0
SINUS PRESSURE: 0
MYALGIAS: 0
CHILLS: 0
DIARRHEA: 0
RHINORRHEA: 0
SLEEP DISTURBANCE: 0
ARTHRALGIAS: 0
FATIGUE: 1
LIGHT-HEADEDNESS: 0
COUGH: 0
VOMITING: 0
HEADACHES: 0
CONSTIPATION: 0
POLYDIPSIA: 0
SINUS PAIN: 0

## 2024-11-07 ASSESSMENT — PATIENT HEALTH QUESTIONNAIRE - PHQ9
2. FEELING DOWN, DEPRESSED OR HOPELESS: NOT AT ALL
SUM OF ALL RESPONSES TO PHQ9 QUESTIONS 1 AND 2: 0
1. LITTLE INTEREST OR PLEASURE IN DOING THINGS: NOT AT ALL

## 2024-11-07 ASSESSMENT — PAIN SCALES - GENERAL: PAINLEVEL_OUTOF10: 0-NO PAIN

## 2024-11-07 NOTE — PROGRESS NOTES
"Subjective   Patient ID: Elana Dodd is a 69 y.o. female who presents for New Patient Visit and Establish Care (Mammogram 5/2024).  Here today to establish care.  Known active recurrent colorectal cancer with liver (and now gallbladder) metastases.     Currently bothered by increasing fatigue, poor concentration.  Recently has been having some worsening exhaustion and GI symptoms.      Recently had escitalopram added to her medications.  Has been tolerating this well overall.          Review of Systems   Constitutional:  Positive for fatigue. Negative for chills and fever.   HENT:  Negative for congestion, hearing loss, rhinorrhea, sinus pressure, sinus pain and tinnitus.    Eyes:  Negative for visual disturbance.   Respiratory:  Negative for cough, shortness of breath and wheezing.    Cardiovascular:  Negative for chest pain, palpitations and leg swelling.   Gastrointestinal:  Positive for nausea. Negative for constipation, diarrhea and vomiting.   Endocrine: Negative for cold intolerance, heat intolerance, polydipsia and polyuria.   Genitourinary:  Negative for dysuria, frequency, menstrual problem, urgency and vaginal discharge.   Musculoskeletal:  Negative for arthralgias and myalgias.   Skin:  Negative for pallor, rash and wound.   Neurological:  Negative for dizziness, light-headedness and headaches.   Psychiatric/Behavioral:  Negative for dysphoric mood and sleep disturbance. The patient is not nervous/anxious.        Objective     Visit Vitals  /68 (BP Location: Left arm)   Pulse 77   Temp 36.7 °C (98 °F) (Temporal)   Ht 1.67 m (5' 5.75\")   Wt 65.8 kg (145 lb)   SpO2 98%   BMI 23.58 kg/m²   Smoking Status Former   BSA 1.75 m²         Physical Exam  Constitutional:       Appearance: Normal appearance.   HENT:      Head: Normocephalic and atraumatic.      Right Ear: Tympanic membrane, ear canal and external ear normal.      Left Ear: Tympanic membrane, ear canal and external ear normal.      Nose: " Nose normal.      Mouth/Throat:      Mouth: Mucous membranes are moist.      Pharynx: Oropharynx is clear.   Eyes:      Extraocular Movements: Extraocular movements intact.      Conjunctiva/sclera: Conjunctivae normal.      Pupils: Pupils are equal, round, and reactive to light.   Cardiovascular:      Rate and Rhythm: Normal rate and regular rhythm.      Pulses: Normal pulses.      Heart sounds: Normal heart sounds.   Pulmonary:      Effort: Pulmonary effort is normal.      Breath sounds: Normal breath sounds.   Abdominal:      General: There is no distension.      Palpations: Abdomen is soft.      Tenderness: There is no abdominal tenderness.   Musculoskeletal:         General: Normal range of motion.      Cervical back: Normal range of motion.   Lymphadenopathy:      Cervical: No cervical adenopathy.   Skin:     General: Skin is warm and dry.   Neurological:      Mental Status: She is alert and oriented to person, place, and time. Mental status is at baseline.   Psychiatric:         Mood and Affect: Mood normal.         Behavior: Behavior normal.         Assessment & Plan  Malignant neoplasm of colon, unspecified part of colon (Multi)    Orders:    Referral to Primary Care  It is entirely plausible that patient's symptoms may be related to her advanced colorectal cancer or ongoing chemotherapy.  This is the case I am not sure that I am more qualified to manage these symptoms than oncology.   Fatigue, unspecified type    Orders:    Referral to Primary Care    Cortisol; Future  Patient does report considerable improvement after taking corticosteroids.  Will obtain an a.m. cortisol and see if perhaps there is some component of hypoadrenal function.  Anxiety    Orders:    DULoxetine (Cymbalta) 60 mg DR capsule; Take 1 capsule (60 mg) by mouth once daily. Do not crush or chew.  Trial of duloxetine rather than escitalopram as the noradrenergic component may be beneficial for her from a fatigue  standpoint.  Hypokalemia    Orders:    potassium chloride CR (Klor-Con) 10 mEq ER tablet; Take 1 tablet (10 mEq) by mouth once daily. Do not crush, chew, or split.  Potassium has remained low for the last couple of draws.  Could contribute to muscle cramping and decreased exercise tolerance.  I am unsure if she will be able to absorb this adequately, may require IV replacement but we will try oral for now.       Reviewed and approved by FREDRICK MOORE on 11/7/24 at 12:10 PM.

## 2024-11-07 NOTE — ASSESSMENT & PLAN NOTE
Orders:    Referral to Primary Care  It is entirely plausible that patient's symptoms may be related to her advanced colorectal cancer or ongoing chemotherapy.  This is the case I am not sure that I am more qualified to manage these symptoms than oncology.

## 2024-11-08 ENCOUNTER — TELEMEDICINE (OUTPATIENT)
Dept: PALLIATIVE MEDICINE | Facility: CLINIC | Age: 69
End: 2024-11-08
Payer: COMMERCIAL

## 2024-11-08 DIAGNOSIS — G89.3 CANCER RELATED PAIN: ICD-10-CM

## 2024-11-08 DIAGNOSIS — F41.1 ANXIETY ASSOCIATED WITH CANCER DIAGNOSIS (MULTI): ICD-10-CM

## 2024-11-08 DIAGNOSIS — F41.9 ANXIETY AND DEPRESSION: ICD-10-CM

## 2024-11-08 DIAGNOSIS — C80.1 ANXIETY ASSOCIATED WITH CANCER DIAGNOSIS (MULTI): ICD-10-CM

## 2024-11-08 DIAGNOSIS — Z51.81 ENCOUNTER FOR MONITORING OPIOID MAINTENANCE THERAPY: ICD-10-CM

## 2024-11-08 DIAGNOSIS — F32.A ANXIETY AND DEPRESSION: ICD-10-CM

## 2024-11-08 DIAGNOSIS — Z79.891 ENCOUNTER FOR MONITORING OPIOID MAINTENANCE THERAPY: ICD-10-CM

## 2024-11-08 DIAGNOSIS — Z51.5 PALLIATIVE CARE ENCOUNTER: Primary | ICD-10-CM

## 2024-11-08 PROCEDURE — 1111F DSCHRG MED/CURRENT MED MERGE: CPT | Performed by: NURSE PRACTITIONER

## 2024-11-08 PROCEDURE — 99213 OFFICE O/P EST LOW 20 MIN: CPT | Performed by: NURSE PRACTITIONER

## 2024-11-08 RX ORDER — OXYCODONE HYDROCHLORIDE 15 MG/1
15 TABLET ORAL EVERY 6 HOURS PRN
Qty: 120 TABLET | Refills: 0 | Status: SHIPPED | OUTPATIENT
Start: 2024-11-08 | End: 2024-12-08

## 2024-11-08 RX ORDER — LORAZEPAM 0.5 MG/1
0.5 TABLET ORAL EVERY 8 HOURS PRN
Qty: 90 TABLET | Refills: 3 | Status: SHIPPED | OUTPATIENT
Start: 2024-11-08 | End: 2024-12-08

## 2024-11-08 NOTE — PROGRESS NOTES
"  SUPPORTIVE AND PALLIATIVE ONCOLOGY OUTPATIENT FOLLOW-UP      SERVICE DATE: 11/8/2024    Virtual or Telephone Consent    A telephone visit (audio only) between the patient (at the originating site) and the provider (at the distant site) was utilized to provide this telehealth service.   Verbal consent was requested and obtained from Elana Dodd on this date, 11/08/24 for a telehealth visit.      Cancer History  Recurrent colon CA with liver mets   -7/24 - supposed to start FOLFOX. Had issues with skin open over port line.  -7/31/24 - present: FOLFOX + panitumumab (panitumumab added starting C3)   -C4 - Stopped panitumumab per patient request as infusion time too long     Onc: Conces     Subjective   HISTORY OF PRESENT ILLNESS: Elana Dodd is a 69 y.o. female with Pmhx of RA, GERD, R nephrectomy (donated kidney to sister in 1998), R breast CA (2020), and metastatic colon CA.      She presents to supportive oncology for follow up for pain and symptom management.     Spoke with pt on the phone for follow up. Reports she hasn't been \"as sick\" since her last chemo treatment. Pain is worse at night, especially if she lays on her side, and sometimes has sharp pain in her pelvis, which has become more frequent. She is taking 2 oxycodone at night, which also helps her relax. Moving bowels regularly. Occasional N/V. Struggling with anxiety, mind is racing often, sits down and finds her legs are shaking. She started lexapro about 2.5 weeks ago, takes ativan PRN. Energy levels are low, feels exhausted often.     Pain Assessment:  Pain Score:  4  Location:  liver  Education:      Symptom Assessment:  Pain:somewhat  Headache: none  Dizziness:none  Lack of energy: very much  Difficulty sleeping: somewhat  Worrying: a little  Anxiety: very much  Depression: none  Pain in mouth/swallowing: none  Dry mouth: none  Taste changes: none  Shortness of breath: none  Lack of appetite: none   Nausea: a little  Vomiting: a " little  Constipation: none  Diarrhea: none  Sore muscles: none  Numbness or tingling in hands/feet/other: none  Weight loss: none      Information obtained from: interview of patient  ______________________________________________________________________        Objective                PHYSICAL EXAMINATION not performed d/t phone visit  Vital Signs:   Vital signs reviewed  There were no vitals filed for this visit.  Pain Score: 4        ASSESSMENT/PLAN    Pain  Pain is: cancer related pain  Type: visceral  Pain control: well-controlled  Home regimen:   -continue oxycodone 15mg q6hrs PRN. Rx sent today.   Intolerances/previously tried:      Opioid Use  Medication Management:   - OARRS report reviewed with no aberrant behavior; consistent with  prescriptions/records and patient history  - MED 0.  Overdose Risk Score 130.   This has been discussed with patient.   - We will continue to closely monitor the patient for signs of prescription misuse including UDS, OARRS review and subjective reports at each visit.  - concurrent benzodiazepine use with ativan, educated pt on medication safety when used in combination with opiates    - I am a provider who either is or has consulted and collaborated with a provider certified in Hospice and Palliative Medicine and have conducted a face-face visit and examination for this patient.  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   -continue compazine 10mg q6hrs PRN.      Constipation   At risk for constipation  related to opioids,  currently constipated   Usual bowel pattern: q1-2 days   Current regimen:   -educated pt on importance of maintaining regular bowel schedule  -continue colace 100mg bid PRN for hard stools  -continue senna 8.6mg, 1-2tabs, 1-2x/day  -continue MOM 15mL 4x/day PRN     Altered Mood  Acute on chronic anxiety related to health concerns   uncontrolled with home regimen  Current regimen:   -continue ativan 0.5mg tid PRN. Rx sent today.   -discussed daily medication management, pt declined at this time     Sleeping Difficulty:  Impaired sleep related to insomnia, anxiety, pain  Current regimen:    -goal for improved sleep with better pain/ anxiety control   -see pain/ mood section/ plan above   -start doxepin 10mg at bedtime. Rx sent today.      Decreased appetite  Related to malignancy  Current regimen:    -encouraged smaller, more frequent meals through out the day  -encouraged use of supplements such as Boost, Ensure, etc.     Supportive Interventions:    Supportive and Palliative Oncology encounter:  Emotional support provided  Coordination of care  We will continue to follow and address symptoms as needed    Advance Directives  Existence of Advance Directives:Yes, but NOT documented in medical record  Decision maker: Surrogate decision maker is Jose Carlos Shelton (daughter) and Darya Leal (daughter)  Code Status: DNR- comfort care arrest         Next Follow-Up Visit:  Return to clinic in 6 weeks     Signature and billing  Medical complexity was low level due to due to complexity of problems, extensive data review, and high risk of management/treatment.  Time was spent on the following: Prep Time, Time Directly with Patient/Family/Caregiver, Documentation Time. Total time spent: 25 mins      Data  Diagnostic tests and information reviewed for today's visit:  Most recent labs         Some elements copied from my note on 10/1/24, the elements have been updated and all reflect current decision making  from today, 11/8/2024.      Plan of Care discussed with: Patient    SIGNATURE: Gege Medellin, APRN-CNP    Contact information:  Supportive and Palliative Oncology  Monday-Friday 8 AM-5 PM  Phone:  [phone], press option #5, then option #1.   Or Epic Secure Chat

## 2024-11-12 ENCOUNTER — TELEPHONE (OUTPATIENT)
Dept: HEMATOLOGY/ONCOLOGY | Facility: CLINIC | Age: 69
End: 2024-11-12
Payer: COMMERCIAL

## 2024-11-12 ENCOUNTER — INFUSION (OUTPATIENT)
Dept: HEMATOLOGY/ONCOLOGY | Facility: CLINIC | Age: 69
End: 2024-11-12
Payer: COMMERCIAL

## 2024-11-12 DIAGNOSIS — C18.9 METASTATIC COLON CANCER TO LIVER (MULTI): ICD-10-CM

## 2024-11-12 DIAGNOSIS — C18.9 MALIGNANT NEOPLASM OF COLON, UNSPECIFIED PART OF COLON (MULTI): Primary | ICD-10-CM

## 2024-11-12 DIAGNOSIS — C78.7 METASTATIC COLON CANCER TO LIVER (MULTI): ICD-10-CM

## 2024-11-12 DIAGNOSIS — C18.9 MALIGNANT NEOPLASM OF COLON, UNSPECIFIED PART OF COLON (MULTI): ICD-10-CM

## 2024-11-12 LAB
ALBUMIN SERPL BCP-MCNC: 3.6 G/DL (ref 3.4–5)
ALP SERPL-CCNC: 580 U/L (ref 33–136)
ALT SERPL W P-5'-P-CCNC: 16 U/L (ref 7–45)
ANION GAP SERPL CALC-SCNC: 15 MMOL/L (ref 10–20)
AST SERPL W P-5'-P-CCNC: 59 U/L (ref 9–39)
BASOPHILS # BLD AUTO: 0.02 X10*3/UL (ref 0–0.1)
BASOPHILS NFR BLD AUTO: 0.2 %
BILIRUB SERPL-MCNC: 0.4 MG/DL (ref 0–1.2)
BUN SERPL-MCNC: 9 MG/DL (ref 6–23)
CALCIUM SERPL-MCNC: 9.6 MG/DL (ref 8.6–10.3)
CHLORIDE SERPL-SCNC: 95 MMOL/L (ref 98–107)
CO2 SERPL-SCNC: 27 MMOL/L (ref 21–32)
CREAT SERPL-MCNC: 0.68 MG/DL (ref 0.5–1.05)
DACRYOCYTES BLD QL SMEAR: NORMAL
EGFRCR SERPLBLD CKD-EPI 2021: >90 ML/MIN/1.73M*2
EOSINOPHIL # BLD AUTO: 0.07 X10*3/UL (ref 0–0.7)
EOSINOPHIL NFR BLD AUTO: 0.7 %
ERYTHROCYTE [DISTWIDTH] IN BLOOD BY AUTOMATED COUNT: 20.3 % (ref 11.5–14.5)
GIANT PLATELETS BLD QL SMEAR: NORMAL
GLUCOSE SERPL-MCNC: 125 MG/DL (ref 74–99)
HCT VFR BLD AUTO: 29.7 % (ref 36–46)
HGB BLD-MCNC: 9.1 G/DL (ref 12–16)
HYPOCHROMIA BLD QL SMEAR: NORMAL
IMM GRANULOCYTES # BLD AUTO: 0.05 X10*3/UL (ref 0–0.7)
IMM GRANULOCYTES NFR BLD AUTO: 0.5 % (ref 0–0.9)
LYMPHOCYTES # BLD AUTO: 1.03 X10*3/UL (ref 1.2–4.8)
LYMPHOCYTES NFR BLD AUTO: 10.4 %
MAGNESIUM SERPL-MCNC: 1.79 MG/DL (ref 1.6–2.4)
MCH RBC QN AUTO: 29.1 PG (ref 26–34)
MCHC RBC AUTO-ENTMCNC: 30.6 G/DL (ref 32–36)
MCV RBC AUTO: 95 FL (ref 80–100)
MONOCYTES # BLD AUTO: 0.88 X10*3/UL (ref 0.1–1)
MONOCYTES NFR BLD AUTO: 8.8 %
NEUTROPHILS # BLD AUTO: 7.9 X10*3/UL (ref 1.2–7.7)
NEUTROPHILS NFR BLD AUTO: 79.4 %
NRBC BLD-RTO: 0 /100 WBCS (ref 0–0)
PLATELET # BLD AUTO: 265 X10*3/UL (ref 150–450)
POLYCHROMASIA BLD QL SMEAR: NORMAL
POTASSIUM SERPL-SCNC: 3.9 MMOL/L (ref 3.5–5.3)
PROT SERPL-MCNC: 7.2 G/DL (ref 6.4–8.2)
RBC # BLD AUTO: 3.13 X10*6/UL (ref 4–5.2)
RBC MORPH BLD: NORMAL
SCHISTOCYTES BLD QL SMEAR: NORMAL
SODIUM SERPL-SCNC: 133 MMOL/L (ref 136–145)
WBC # BLD AUTO: 10 X10*3/UL (ref 4.4–11.3)

## 2024-11-12 PROCEDURE — 85025 COMPLETE CBC W/AUTO DIFF WBC: CPT

## 2024-11-12 PROCEDURE — 2500000004 HC RX 250 GENERAL PHARMACY W/ HCPCS (ALT 636 FOR OP/ED): Performed by: STUDENT IN AN ORGANIZED HEALTH CARE EDUCATION/TRAINING PROGRAM

## 2024-11-12 PROCEDURE — 83735 ASSAY OF MAGNESIUM: CPT

## 2024-11-12 PROCEDURE — 36591 DRAW BLOOD OFF VENOUS DEVICE: CPT

## 2024-11-12 PROCEDURE — 80053 COMPREHEN METABOLIC PANEL: CPT

## 2024-11-12 RX ORDER — HEPARIN 100 UNIT/ML
500 SYRINGE INTRAVENOUS AS NEEDED
Status: DISCONTINUED | OUTPATIENT
Start: 2024-11-12 | End: 2024-11-12 | Stop reason: HOSPADM

## 2024-11-12 RX ORDER — HEPARIN 100 UNIT/ML
500 SYRINGE INTRAVENOUS AS NEEDED
OUTPATIENT
Start: 2024-11-12

## 2024-11-12 RX ORDER — HEPARIN SODIUM,PORCINE/PF 10 UNIT/ML
50 SYRINGE (ML) INTRAVENOUS AS NEEDED
OUTPATIENT
Start: 2024-11-12

## 2024-11-12 RX ORDER — HEPARIN SODIUM,PORCINE/PF 10 UNIT/ML
50 SYRINGE (ML) INTRAVENOUS AS NEEDED
Status: DISCONTINUED | OUTPATIENT
Start: 2024-11-12 | End: 2024-11-12 | Stop reason: HOSPADM

## 2024-11-12 NOTE — TELEPHONE ENCOUNTER
"Called and spoke to Elana to let her know that she has been added on for treatment tomorrow 11/13 at 1130 am with a visit with Dr. Hu at 1040. Patient stated she did not get sick after her last treatment but she has been feeling off balance. \"I can not focus on anything, I tried reading directions on a space heater and my eyes were all over the place and I had to have my cousin help me read them. My balance has been off and I have bumping into walls\". When I asked her when these symptoms started she stated it was about two and half weeks ago. She then reported \"If I close my eyes I would fall\". Denies slurred speech, blurred vision, facial droop, unilateral weakness. Stated \" I have no stroke symptoms\". Patient due in infusion center for labs today. Patient educated that if she has any facial droop, blurred vision, dizziness, unilateral weakness or signs of stroke No barriers to education noted, patient agreed to plan and verbalized understanding using the teach back method.    "

## 2024-11-12 NOTE — PROGRESS NOTES
Patient here for blood draw from [facility]. Accessed, good blood return, sent blood to lab, flushed per orders. Denies questions or needs at this time.

## 2024-11-13 ENCOUNTER — APPOINTMENT (OUTPATIENT)
Dept: RADIOLOGY | Facility: HOSPITAL | Age: 69
End: 2024-11-13
Payer: COMMERCIAL

## 2024-11-13 ENCOUNTER — INFUSION (OUTPATIENT)
Dept: HEMATOLOGY/ONCOLOGY | Facility: CLINIC | Age: 69
End: 2024-11-13
Payer: COMMERCIAL

## 2024-11-13 ENCOUNTER — OFFICE VISIT (OUTPATIENT)
Dept: HEMATOLOGY/ONCOLOGY | Facility: CLINIC | Age: 69
End: 2024-11-13
Payer: COMMERCIAL

## 2024-11-13 VITALS
RESPIRATION RATE: 16 BRPM | TEMPERATURE: 98.5 F | HEART RATE: 80 BPM | BODY MASS INDEX: 23.52 KG/M2 | OXYGEN SATURATION: 100 % | SYSTOLIC BLOOD PRESSURE: 131 MMHG | WEIGHT: 144.62 LBS | DIASTOLIC BLOOD PRESSURE: 86 MMHG

## 2024-11-13 DIAGNOSIS — C18.9 MALIGNANT NEOPLASM OF COLON, UNSPECIFIED PART OF COLON (MULTI): ICD-10-CM

## 2024-11-13 DIAGNOSIS — C18.9 METASTATIC COLON CANCER TO LIVER (MULTI): Primary | ICD-10-CM

## 2024-11-13 DIAGNOSIS — C78.7 METASTATIC COLON CANCER TO LIVER (MULTI): ICD-10-CM

## 2024-11-13 DIAGNOSIS — C18.9 METASTATIC COLON CANCER TO LIVER (MULTI): ICD-10-CM

## 2024-11-13 DIAGNOSIS — C78.7 METASTATIC COLON CANCER TO LIVER (MULTI): Primary | ICD-10-CM

## 2024-11-13 PROCEDURE — 99215 OFFICE O/P EST HI 40 MIN: CPT | Performed by: STUDENT IN AN ORGANIZED HEALTH CARE EDUCATION/TRAINING PROGRAM

## 2024-11-13 PROCEDURE — 96416 CHEMO PROLONG INFUSE W/PUMP: CPT

## 2024-11-13 PROCEDURE — 96415 CHEMO IV INFUSION ADDL HR: CPT

## 2024-11-13 PROCEDURE — 2500000004 HC RX 250 GENERAL PHARMACY W/ HCPCS (ALT 636 FOR OP/ED): Performed by: STUDENT IN AN ORGANIZED HEALTH CARE EDUCATION/TRAINING PROGRAM

## 2024-11-13 PROCEDURE — 96375 TX/PRO/DX INJ NEW DRUG ADDON: CPT | Mod: INF

## 2024-11-13 PROCEDURE — 96413 CHEMO IV INFUSION 1 HR: CPT

## 2024-11-13 PROCEDURE — 1111F DSCHRG MED/CURRENT MED MERGE: CPT | Performed by: STUDENT IN AN ORGANIZED HEALTH CARE EDUCATION/TRAINING PROGRAM

## 2024-11-13 PROCEDURE — 1126F AMNT PAIN NOTED NONE PRSNT: CPT | Performed by: STUDENT IN AN ORGANIZED HEALTH CARE EDUCATION/TRAINING PROGRAM

## 2024-11-13 RX ORDER — EPINEPHRINE 0.3 MG/.3ML
0.3 INJECTION SUBCUTANEOUS EVERY 5 MIN PRN
Status: DISCONTINUED | OUTPATIENT
Start: 2024-11-13 | End: 2024-11-13 | Stop reason: HOSPADM

## 2024-11-13 RX ORDER — MAGNESIUM SULFATE HEPTAHYDRATE 40 MG/ML
2 INJECTION, SOLUTION INTRAVENOUS ONCE AS NEEDED
Status: CANCELLED | OUTPATIENT
Start: 2024-11-13

## 2024-11-13 RX ORDER — PALONOSETRON 0.05 MG/ML
0.25 INJECTION, SOLUTION INTRAVENOUS ONCE
Status: CANCELLED | OUTPATIENT
Start: 2024-11-13

## 2024-11-13 RX ORDER — PROCHLORPERAZINE EDISYLATE 5 MG/ML
10 INJECTION INTRAMUSCULAR; INTRAVENOUS EVERY 6 HOURS PRN
Status: DISCONTINUED | OUTPATIENT
Start: 2024-11-13 | End: 2024-11-13 | Stop reason: HOSPADM

## 2024-11-13 RX ORDER — PROCHLORPERAZINE MALEATE 10 MG
10 TABLET ORAL EVERY 6 HOURS PRN
Status: DISCONTINUED | OUTPATIENT
Start: 2024-11-13 | End: 2024-11-13 | Stop reason: HOSPADM

## 2024-11-13 RX ORDER — PROCHLORPERAZINE EDISYLATE 5 MG/ML
10 INJECTION INTRAMUSCULAR; INTRAVENOUS EVERY 6 HOURS PRN
Status: CANCELLED | OUTPATIENT
Start: 2024-11-13

## 2024-11-13 RX ORDER — PROCHLORPERAZINE MALEATE 10 MG
10 TABLET ORAL EVERY 6 HOURS PRN
Status: CANCELLED | OUTPATIENT
Start: 2024-11-13

## 2024-11-13 RX ORDER — FAMOTIDINE 10 MG/ML
20 INJECTION INTRAVENOUS ONCE AS NEEDED
Status: CANCELLED | OUTPATIENT
Start: 2024-11-13

## 2024-11-13 RX ORDER — MAGNESIUM SULFATE HEPTAHYDRATE 40 MG/ML
4 INJECTION, SOLUTION INTRAVENOUS ONCE AS NEEDED
Status: CANCELLED | OUTPATIENT
Start: 2024-11-13

## 2024-11-13 RX ORDER — LORAZEPAM 2 MG/ML
1 INJECTION INTRAMUSCULAR AS NEEDED
Status: CANCELLED | OUTPATIENT
Start: 2024-11-13

## 2024-11-13 RX ORDER — MAGNESIUM SULFATE HEPTAHYDRATE 40 MG/ML
2 INJECTION, SOLUTION INTRAVENOUS ONCE AS NEEDED
Status: DISCONTINUED | OUTPATIENT
Start: 2024-11-13 | End: 2024-11-13 | Stop reason: HOSPADM

## 2024-11-13 RX ORDER — ALBUTEROL SULFATE 0.83 MG/ML
3 SOLUTION RESPIRATORY (INHALATION) AS NEEDED
Status: DISCONTINUED | OUTPATIENT
Start: 2024-11-13 | End: 2024-11-13 | Stop reason: HOSPADM

## 2024-11-13 RX ORDER — DIPHENHYDRAMINE HYDROCHLORIDE 50 MG/ML
50 INJECTION INTRAMUSCULAR; INTRAVENOUS AS NEEDED
Status: DISCONTINUED | OUTPATIENT
Start: 2024-11-13 | End: 2024-11-13 | Stop reason: HOSPADM

## 2024-11-13 RX ORDER — ALBUTEROL SULFATE 0.83 MG/ML
3 SOLUTION RESPIRATORY (INHALATION) AS NEEDED
Status: CANCELLED | OUTPATIENT
Start: 2024-11-13

## 2024-11-13 RX ORDER — PALONOSETRON 0.05 MG/ML
0.25 INJECTION, SOLUTION INTRAVENOUS ONCE
Status: COMPLETED | OUTPATIENT
Start: 2024-11-13 | End: 2024-11-13

## 2024-11-13 RX ORDER — DEXAMETHASONE 6 MG/1
12 TABLET ORAL ONCE
Status: CANCELLED | OUTPATIENT
Start: 2024-11-13 | End: 2024-11-13

## 2024-11-13 RX ORDER — FAMOTIDINE 10 MG/ML
20 INJECTION INTRAVENOUS ONCE AS NEEDED
Status: DISCONTINUED | OUTPATIENT
Start: 2024-11-13 | End: 2024-11-13 | Stop reason: HOSPADM

## 2024-11-13 RX ORDER — MAGNESIUM SULFATE HEPTAHYDRATE 40 MG/ML
4 INJECTION, SOLUTION INTRAVENOUS ONCE AS NEEDED
Status: DISCONTINUED | OUTPATIENT
Start: 2024-11-13 | End: 2024-11-13 | Stop reason: HOSPADM

## 2024-11-13 RX ORDER — DIPHENHYDRAMINE HYDROCHLORIDE 50 MG/ML
50 INJECTION INTRAMUSCULAR; INTRAVENOUS AS NEEDED
Status: CANCELLED | OUTPATIENT
Start: 2024-11-13

## 2024-11-13 RX ORDER — LORAZEPAM 2 MG/ML
1 INJECTION INTRAMUSCULAR AS NEEDED
Status: DISCONTINUED | OUTPATIENT
Start: 2024-11-13 | End: 2024-11-13 | Stop reason: HOSPADM

## 2024-11-13 RX ORDER — DEXAMETHASONE 6 MG/1
12 TABLET ORAL ONCE
Status: COMPLETED | OUTPATIENT
Start: 2024-11-13 | End: 2024-11-13

## 2024-11-13 RX ORDER — EPINEPHRINE 0.3 MG/.3ML
0.3 INJECTION SUBCUTANEOUS EVERY 5 MIN PRN
Status: CANCELLED | OUTPATIENT
Start: 2024-11-13

## 2024-11-13 ASSESSMENT — PAIN SCALES - GENERAL: PAINLEVEL_OUTOF10: 0-NO PAIN

## 2024-11-13 NOTE — PROGRESS NOTES
MD set stat MRI at [facility] for 345 today. Pt states she does not want to go as she is worried she will not make it is time due to the ending of her chemo treatment. There is also concern that she cannot have done as she is connected to her Pump which may not be compatible with the MRI machine.  Pt states she does not agree to go today regardless but agrees to go if rescheduled for Friday.  She is aware that MD wants her to have family or friend to drive her home. Pt refuses and states she is fine and able to drive herself home and accepts full responsibility.   She is aware that  to call with new MRI time.  MD updated

## 2024-11-13 NOTE — PROGRESS NOTES
Elana Olmos is here today for FOLFOX infusion, tolerated well. CADD pump connected and infusing at time of discharge, pt in stable condition. Reviewed schedule with patient, she is aware to return 11/15 at 1315 for pump disconnect and that she has MRI brain on 11/15 at 1600 at [facility]. No further needs at this time.

## 2024-11-13 NOTE — PROGRESS NOTES
"Patient ambulated into clinic for follow up with Dr. Hu alone. Medications and allergies reviewed.     After last cycle patient denies nausea, vomiting, and diarrhea. Bowel movements have been normal.   Reports extreme fatigue for the past two weeks. Reports dizziness all the time which started two weeks ago, worst when she moves her head to fast, cannot focus to read captions. Reports being \"wobbly walking in to the center today, I feel like I am just going through the motions. I do not even remember driving in today\".     When asked about pain she stated \"my liver has been kicking, I have been using a castor oil wrap every other night\".     Neuro assessment completed by RN and MD.   MRI brain order for Friday for evaluation.     Patient to call family to drive her home from infusion appointment.     Follow up in 2 weeks for next cycle.     "

## 2024-11-13 NOTE — PROGRESS NOTES
[facility]   GI Medical Oncology Clinic  Follow-Up Patient Visit    Patient Name: Elana Dodd  MRN: [id number]  Date of Service: 11/13/24  PCP: Zbigniew Miller DO    Diagnosis: recurrent colon adenocarcinoma with metastatic disease to the liver   MMR: proficient  NGS: Caris: BRYANT, mutations in DACH1, SETD2, TP53, APC  Tempus xF: TP53, APC, TMB 8.6, BRYANT   CEA: 44.2 (6/3/24), 67.8 (7/31/24)  CA 19-9: 178 (6/3/24)     Oncologic History:     Ms. Elana Dodd is a 70 yo F with PMH of HTN, RA (on leflunomide), HLD, GERD, s/p R nephrectomy (1998, donated kidney to sister), R breast cancer (8/20/20, ER+/IL+/HER2 1+ by IHC/AMARIS+) s/p R lumpectomy and R SLN (11/5/2020) followed by carboplatin/Taxotere/Herceptin x4C (stopped due to SE) and R RT (completed 7/2021) on letrozole (since 7/2021), and stage IIA colon cancer s/p laparoscopic sigmoid colectomy and colostomy and en bloc resection with appendectomy and L ureterolysis (7/6/2020) with subsequent pelvic RT (completed 10/15/2020) lost to follow-up found to have recurrent metastatic colon cancer to the liver.     She was previously diagnosed and treated for her breast and colon cancers in [place].      6/23/2020 - PET/CT - hypermetabolic mass in sigmoid colon & hypermet mass in R breast      7/6/2020 - sigmoid colon resection - pathology: Moderately differentiated invasive adenocarcinoma, 5.5 cm in diameter. Invades through muscularis propria focally into subserosal tissue. Marked adhesion of small bowel segments to colon without involvement of malignancy, +0/28 LN, negative margins, small focus of carcinoma in the serosa of margin furthest from this tumor (pT3 N0), at least stage IIA     8/12/2020 - BL mammogram & US: 3.6 cm mass in R breast     8/20/2020 - R breast biopsy - path: Invasive poorly differentiated ductal carcinoma. ER positive and IL positive and HER-2 by IHC is 1+, but by HER-2 AMARIS is positive. On MammaPrint testing malignancy is ER and IL positive  and HER-2 positive, high risk, with a predicted 5-year metastasis free survival of 93% with chemotherapy and hormonal therapy versus 71% metastasis free survival with hormonal therapy alone.      10/15/2020 - Completed pelvic radiation      11/5/2020 - R lumpectomy and R SLNBx - path: 2.6 cm invasive ductal carcinoma, with all margins free of malignancy. The malignancy comes to within 1 mm of the closest deep margin. Perineural invasion is present. There is a focus of metastatic carcinoma, measuring 0.5 mm in 1 of 2 sentinel lymph nodes. pT2 pN1mic. Stage IIB     1/8/2021 - CT CAP - RLQ colostomy in tact, tiny nodules LLL nonspecific     5/11/2021 - CT CAP - stable small pulm nodules, worsening ventral hernia, interval takedown of LLQ ostomy, no evidence of met disease      Transitioned her care to her PCP where she was being prescribed letrozole. Has not had repeat mammogram or colonoscopy.     4/10/24 - Re-established care with medical oncology with Dr. Rcicardo Blake at [facility]. Had not been seen by medical oncology since August 2021. Letrozole had been refilled by PCP. CT CAP, colonoscopy, CEA, CA 19-9, CA 27.29 were all ordered.      6/18/24 - CT A/P w contrast - large hepatic mass (7.5x8.7cm) involving both anterior segment of R lobe as well as medial segment of L lobe likely related to metastatic disease possibly colon cancer however met breast CA not excluded w hx; enlarged mohit hepatic LN likely metastatic, probably stable sidebranch IPMN and in pancreas   CT chest non con - stable tiny nodule superolateral LLL (3mm)     6/24/24 - Dr. Blake discussed imaging. Biopsy ordered.      7/1/24 - CT guided biopsy of liver lesion, path showed adenocarcinoma, metastatic from patient's known colonic primary, IHC +CK20, CDX2, SATB2 while negative for GATA3 and CK7, pMMR     1018-10/20/24 - [place] [facility] [place] with concern for cholecystitis. Surgical oncology consulted: less likely cholecystitis with no plan for  "cholecystectomy or percutaneous cholecystotomy tube. She was started empirically on Zosyn pending cultures. Blood cultures negative. Oncology consulted, who believed the pain was from tumor necrosis around the liver. Patient feeling better with less perspiration. She was discharged on ciprofloxacin and metronidazole.     Current Treatment:    7/24 - supposed to start FOLFOX. Had issues with skin open over port line.    7/31/24 - present: FOLFOX + panitumumab (panitumumab added starting C3)     C4 - Stopped panitumumab per patient request as infusion time too long    Interval History:    Ms. Dodd is here today for follow-up. Nakia, her daughter, was on phone. She continues to feel tired. She saw a new PCP (Dr. Miller) last week who made some antidepressant adjustments and also ordered a cortisol. She reports no nausea/vomiting, diarrhea with treatment. She is tolerating treatment quite well but \"always feels exhausted\". Of note - this exhaustion was ongoing prior to starting chemo and even during chemotherapy break. She reports \"bumping into walls\" the past 2 weeks when walking around at home. Reports dizziness too but not today in clinic. Having some issues reading due to new vision changes in past 2 weeks as well.     ROS:  10-pt ROS reviewed and negative except as mentioned above.     PMHx / FHx / PSHx: reviewed and are accurate    Medications: below was reviewed and is accurate    Current Outpatient Medications:     allopurinol (Zyloprim) 100 mg tablet, Take 2 tablets (200 mg) by mouth 2 times a day., Disp: , Rfl:     amLODIPine (Norvasc) 5 mg tablet, Take 1 tablet (5 mg) by mouth once daily., Disp: , Rfl:     atorvastatin (Lipitor) 20 mg tablet, Take 1 tablet (20 mg) by mouth once daily., Disp: , Rfl:     cholecalciferol (Vitamin D-3) 25 MCG (1000 UT) capsule, Take 1 capsule (25 mcg) by mouth once daily., Disp: , Rfl:     DULoxetine (Cymbalta) 60 mg DR capsule, Take 1 capsule (60 mg) by mouth once daily. Do " not crush or chew., Disp: 30 capsule, Rfl: 5    leflunomide (Arava) 10 mg tablet, Take 1 tablet (10 mg) by mouth once daily., Disp: , Rfl:     letrozole (Femara) 2.5 mg tablet, Take 1 tablet (2.5 mg total) by mouth once daily.  Take with or without food., Disp: , Rfl:     omeprazole (PriLOSEC) 40 mg DR capsule, Take 1 capsule (40 mg) by mouth once daily. Do not crush or chew., Disp: , Rfl:     acetylcysteine (NAC) 600 mg capsule capsule, Take by mouth. (Patient not taking: Reported on 10/30/2024), Disp: , Rfl:     LORazepam (Ativan) 0.5 mg tablet, Take 1 tablet (0.5 mg) by mouth every 8 hours if needed for anxiety., Disp: 90 tablet, Rfl: 3    minocycline 100 mg capsule, Take 1 capsule (100 mg) by mouth once every 24 hours. For rash prevention (Patient not taking: Reported on 10/30/2024), Disp: 30 capsule, Rfl: 2    ondansetron (Zofran) 8 mg tablet, Take 1 tablet (8 mg) by mouth every 8 hours if needed for nausea or vomiting. (Patient not taking: Reported on 2024), Disp: 30 tablet, Rfl: 5    oxyCODONE (Roxicodone) 15 mg immediate release tablet, Take 1 tablet (15 mg) by mouth every 6 hours if needed for severe pain (7 - 10)., Disp: 120 tablet, Rfl: 0    potassium chloride CR (Klor-Con) 10 mEq ER tablet, Take 1 tablet (10 mEq) by mouth once daily. Do not crush, chew, or split., Disp: 30 tablet, Rfl: 11    prochlorperazine (Compazine) 10 mg tablet, Take 1 tablet (10 mg) by mouth every 6 hours if needed for nausea or vomiting., Disp: 30 tablet, Rfl: 5    Physical exam:  Vitals:    24 1056   BP: 131/86   BP Location: Right arm   Patient Position: Sitting   BP Cuff Size: Adult   Pulse: 80   Resp: 16   Temp: 36.9 °C (98.5 °F)   TempSrc: Temporal   SpO2: 100%   Weight: 65.6 kg (144 lb 10 oz)   ECO  GEN: NAD, appears anxious  HN: EOMI    LUNGS: normal respiratory effort  EXT: No deformities or edema  NEURO: CN II-XII intact. Cerebellar exam (finger to nose, heel to shin) normal. No issues walking. Motor 5/5 in  "all extremities   HEME: No signs of easy bruising or active bleeding.  PSYCH: Appropriate mood and affect    Laboratory review:    11/13/24 labs reviewed     ASSESSMENT AND PLAN:     Ms. Elana Dodd is a 70 yo F with PMH of HTN, RA (on leflunomide), HLD, GERD, s/p R nephrectomy (1998, donated kidney to sister), R breast cancer (8/20/20, ER+/AR+/HER2 1+ by IHC/AMARIS+) s/p R lumpectomy and R SLN (11/5/2020) followed by carboplatin/Taxotere/Herceptin x4C (stopped due to SE) and R RT (completed 7/2021) on letrozole (since 7/2021), and stage IIA colon cancer s/p laparoscopic sigmoid colectomy and colostomy and en bloc resection with appendectomy and L ureterolysis (7/6/2020) with subsequent pelvic RT (completed 10/15/2020) lost to follow-up found to have recurrent metastatic colon cancer to the liver here today for follow-up.     Ms. Dodd has a lot of life stressors going on right now including the tragic and sudden passing of her sister.    Still not checking temperature when she has \"chills\" or feels unwell. Re-educated and discussed importance of identifying a fever to detect an ongoing infection and be evaluated for treatment.      Metastatic colon cancer to the liver: Labs and toxicities within acceptable limits. Proceed with C7 FOLFOX.    Reported imbalance: Neuro exam today was completely normally including her ability to walk straight and cerebellar exam. Motor 5/5 in all extremities. No CN deficits either. I'm not sure why she is bumping into walls at home and this did not occur while in clinic. Will obtain MRI brain to make sure no metastatic disease (hx of breast cancer too).   Anxiety: Severe. Hydroxyzine 20mg PRN and lorazepam 0.5mg PRN. Previously declined escitalopram but changed her mind. Started on escitalopram which has helped her anxiety. Dr. Miller would like her to switch to duloxetine.   Nausea: Olanzapine nightly. Zofran and Compazine as needed.   Anemia: Completed iron IV infusions (2 of " 2).  Fatigue: Unclear etiology. Fatigue is constant even when she took a break from treatment for a month after the passing of her sister.  TSH normal. Dr. Miller ordered cortisol.     RTC in 2 weeks for C8 FOLFOX.    Felipa Hu MD  Staff, Gastrointestinal Medical Oncology  [facility]

## 2024-11-15 ENCOUNTER — HOSPITAL ENCOUNTER (EMERGENCY)
Facility: HOSPITAL | Age: 69
Discharge: HOME | End: 2024-11-15
Attending: STUDENT IN AN ORGANIZED HEALTH CARE EDUCATION/TRAINING PROGRAM
Payer: COMMERCIAL

## 2024-11-15 ENCOUNTER — APPOINTMENT (OUTPATIENT)
Dept: RADIOLOGY | Facility: HOSPITAL | Age: 69
End: 2024-11-15
Payer: COMMERCIAL

## 2024-11-15 ENCOUNTER — TELEPHONE (OUTPATIENT)
Dept: PALLIATIVE MEDICINE | Facility: CLINIC | Age: 69
End: 2024-11-15
Payer: COMMERCIAL

## 2024-11-15 ENCOUNTER — INFUSION (OUTPATIENT)
Dept: HEMATOLOGY/ONCOLOGY | Facility: CLINIC | Age: 69
End: 2024-11-15
Payer: COMMERCIAL

## 2024-11-15 ENCOUNTER — HOSPITAL ENCOUNTER (OUTPATIENT)
Dept: CARDIOLOGY | Facility: HOSPITAL | Age: 69
Discharge: HOME | End: 2024-11-15
Payer: COMMERCIAL

## 2024-11-15 VITALS
OXYGEN SATURATION: 98 % | WEIGHT: 143 LBS | DIASTOLIC BLOOD PRESSURE: 86 MMHG | SYSTOLIC BLOOD PRESSURE: 141 MMHG | HEART RATE: 66 BPM | BODY MASS INDEX: 22.98 KG/M2 | TEMPERATURE: 98.4 F | RESPIRATION RATE: 24 BRPM | HEIGHT: 66 IN

## 2024-11-15 VITALS
RESPIRATION RATE: 18 BRPM | HEART RATE: 86 BPM | BODY MASS INDEX: 23.75 KG/M2 | WEIGHT: 146.06 LBS | DIASTOLIC BLOOD PRESSURE: 77 MMHG | SYSTOLIC BLOOD PRESSURE: 117 MMHG | OXYGEN SATURATION: 97 % | TEMPERATURE: 97 F

## 2024-11-15 DIAGNOSIS — C18.9 METASTATIC COLON CANCER TO LIVER (MULTI): ICD-10-CM

## 2024-11-15 DIAGNOSIS — R07.9 CHEST PAIN, UNSPECIFIED TYPE: Primary | ICD-10-CM

## 2024-11-15 DIAGNOSIS — C78.7 METASTATIC COLON CANCER TO LIVER (MULTI): ICD-10-CM

## 2024-11-15 LAB
ALBUMIN SERPL BCP-MCNC: 3.4 G/DL (ref 3.4–5)
ALP SERPL-CCNC: 459 U/L (ref 33–136)
ALT SERPL W P-5'-P-CCNC: 16 U/L (ref 7–45)
ANION GAP SERPL CALCULATED.3IONS-SCNC: 14 MMOL/L (ref 10–20)
AST SERPL W P-5'-P-CCNC: 54 U/L (ref 9–39)
BASOPHILS # BLD AUTO: 0.02 X10*3/UL (ref 0–0.1)
BASOPHILS NFR BLD AUTO: 0.3 %
BILIRUB SERPL-MCNC: 0.4 MG/DL (ref 0–1.2)
BUN SERPL-MCNC: 13 MG/DL (ref 6–23)
CALCIUM SERPL-MCNC: 8.9 MG/DL (ref 8.6–10.3)
CARDIAC TROPONIN I PNL SERPL HS: 8 NG/L (ref 0–13)
CARDIAC TROPONIN I PNL SERPL HS: 9 NG/L (ref 0–13)
CHLORIDE SERPL-SCNC: 90 MMOL/L (ref 98–107)
CO2 SERPL-SCNC: 26 MMOL/L (ref 21–32)
CREAT SERPL-MCNC: 0.56 MG/DL (ref 0.5–1.05)
DACRYOCYTES BLD QL SMEAR: NORMAL
EGFRCR SERPLBLD CKD-EPI 2021: >90 ML/MIN/1.73M*2
EOSINOPHIL # BLD AUTO: 0.04 X10*3/UL (ref 0–0.7)
EOSINOPHIL NFR BLD AUTO: 0.5 %
ERYTHROCYTE [DISTWIDTH] IN BLOOD BY AUTOMATED COUNT: 20.1 % (ref 11.5–14.5)
GLUCOSE SERPL-MCNC: 104 MG/DL (ref 74–99)
HCT VFR BLD AUTO: 29.7 % (ref 36–46)
HGB BLD-MCNC: 9.6 G/DL (ref 12–16)
IMM GRANULOCYTES # BLD AUTO: 0.07 X10*3/UL (ref 0–0.7)
IMM GRANULOCYTES NFR BLD AUTO: 0.9 % (ref 0–0.9)
LYMPHOCYTES # BLD AUTO: 0.75 X10*3/UL (ref 1.2–4.8)
LYMPHOCYTES NFR BLD AUTO: 9.4 %
MCH RBC QN AUTO: 30 PG (ref 26–34)
MCHC RBC AUTO-ENTMCNC: 32.3 G/DL (ref 32–36)
MCV RBC AUTO: 93 FL (ref 80–100)
MONOCYTES # BLD AUTO: 0.5 X10*3/UL (ref 0.1–1)
MONOCYTES NFR BLD AUTO: 6.3 %
NEUTROPHILS # BLD AUTO: 6.62 X10*3/UL (ref 1.2–7.7)
NEUTROPHILS NFR BLD AUTO: 82.6 %
NRBC BLD-RTO: 0 /100 WBCS (ref 0–0)
OVALOCYTES BLD QL SMEAR: NORMAL
PLATELET # BLD AUTO: 238 X10*3/UL (ref 150–450)
POTASSIUM SERPL-SCNC: 3.2 MMOL/L (ref 3.5–5.3)
PROT SERPL-MCNC: 7 G/DL (ref 6.4–8.2)
RBC # BLD AUTO: 3.2 X10*6/UL (ref 4–5.2)
RBC MORPH BLD: NORMAL
SCHISTOCYTES BLD QL SMEAR: NORMAL
SODIUM SERPL-SCNC: 127 MMOL/L (ref 136–145)
WBC # BLD AUTO: 8 X10*3/UL (ref 4.4–11.3)

## 2024-11-15 PROCEDURE — 96523 IRRIG DRUG DELIVERY DEVICE: CPT

## 2024-11-15 PROCEDURE — 85025 COMPLETE CBC W/AUTO DIFF WBC: CPT | Performed by: STUDENT IN AN ORGANIZED HEALTH CARE EDUCATION/TRAINING PROGRAM

## 2024-11-15 PROCEDURE — 84484 ASSAY OF TROPONIN QUANT: CPT | Performed by: STUDENT IN AN ORGANIZED HEALTH CARE EDUCATION/TRAINING PROGRAM

## 2024-11-15 PROCEDURE — 2550000001 HC RX 255 CONTRASTS

## 2024-11-15 PROCEDURE — 84075 ASSAY ALKALINE PHOSPHATASE: CPT | Performed by: STUDENT IN AN ORGANIZED HEALTH CARE EDUCATION/TRAINING PROGRAM

## 2024-11-15 PROCEDURE — 99285 EMERGENCY DEPT VISIT HI MDM: CPT | Mod: 25

## 2024-11-15 PROCEDURE — 71275 CT ANGIOGRAPHY CHEST: CPT

## 2024-11-15 PROCEDURE — 93005 ELECTROCARDIOGRAM TRACING: CPT

## 2024-11-15 PROCEDURE — 71275 CT ANGIOGRAPHY CHEST: CPT | Performed by: RADIOLOGY

## 2024-11-15 ASSESSMENT — LIFESTYLE VARIABLES
EVER HAD A DRINK FIRST THING IN THE MORNING TO STEADY YOUR NERVES TO GET RID OF A HANGOVER: NO
HAVE PEOPLE ANNOYED YOU BY CRITICIZING YOUR DRINKING: NO
EVER FELT BAD OR GUILTY ABOUT YOUR DRINKING: NO

## 2024-11-15 ASSESSMENT — HEART SCORE
HISTORY: SLIGHTLY SUSPICIOUS
AGE: 65+
HEART SCORE: 3
TROPONIN: LESS THAN OR EQUAL TO NORMAL LIMIT
ECG: NORMAL
RISK FACTORS: 1-2 RISK FACTORS

## 2024-11-15 ASSESSMENT — PAIN SCALES - GENERAL: PAINLEVEL_OUTOF10: 3

## 2024-11-15 NOTE — TELEPHONE ENCOUNTER
Phone call to patient to check in before the weekend. Recall patient had phone visit with Gege Medellin CNP last Friday and saw PCP last Thursday where anxiety medications were changed. Patient confirms she started Cymbalta 60mg yesterday after discussing with Kate last week and Dr. Hu this week too. Patient educated that we advise her take this medication at night as opposed to morning as she is already reporting fatigue during the day and this may make her more tired. Patient is also taking Ativan TID PRN for anxiety.    Patient reviewed symptoms of feeling off balance and bumping into walls and that Dr. Hu ordered a brain scan for her today. She also discussed how she can't focus and decreased energy but that none of these symptoms are new and Kate and Dr. Hu are already aware.     Patient aware to call our office with questions/concerns.

## 2024-11-15 NOTE — DISCHARGE INSTRUCTIONS
Your CAT scan and laboratory studies did not reveal the cause of your symptoms.  You should follow-up with your oncologist as well as cardiology for further evaluation of your chest pain.  Return to the emergency department with any worsening symptoms.    It is important to remember that your care does not end here and you must continue to monitor your condition closely. Please return to the emergency department for any worsening or concerning signs or symptoms as directed by our conversations and the discharge instructions. If you do not have a doctor please contact the referral number on your discharge instructions. Please contact any physician specialists provided in your discharge notes as it is very important to follow up with them regarding your condition. If you are unable to reach the physicians provided, please come back to the Emergency Department at any time.    Return to emergency room without delay for ANY new or worsening pains or for any other symptoms or concerns.  Return with worsening pains, nausea, vomiting, trouble breathing, palpitations, shortness of breath, inability to pass stool or urine, loss of control of stool or urine, any numbness or tingling (that is not normal for you), uncontrolled fevers, the passing of blood or other material in stool or urine, rashes, pains or for any other symptoms or concerns you may have.  You are always welcome to return to the ER at any time for any reason or for any other concerns you may have.

## 2024-11-15 NOTE — ED PROVIDER NOTES
HPI   Chief Complaint   Patient presents with    Chest Pain     Chest pain that started during her chemotherapy session. Patient has stage 4 colon cancer. States that she was at chemo and started to have 3/10 pain that does not radiate anywhere outside of her chest. I nitroglycerin and 324 ASA given       Patient is a 69-year-old female presenting to the emergency department for evaluation of chest pain.  Patient states she has a history of stage IV colon cancer.  She states she was going to turn off her chemotherapy when she started getting a pain in the middle of her chest.  She states it lasted for about 45 minutes and therefore she called EMS.  She states she has never experienced pain like this before.  She denies any history of ACS/CAD.  She denies any history of PE/DVT.  She denies shortness of breath, fevers, chills, nausea, vomiting, abdominal pain, cough, congestion, headaches, numbness, tingling, hematuria, dysuria, constipation, diarrhea.  EMS reportedly gave the patient aspirin and 1 nitro and patient states it is her completely resolved at this time.              Patient History   Past Medical History:   Diagnosis Date    Breast cancer (Multi)     2020 right breast    Colon cancer (Multi)     2020    GERD (gastroesophageal reflux disease)     HLD (hyperlipidemia)     HTN (hypertension)     Rheumatoid arthritis      Past Surgical History:   Procedure Laterality Date    NEPHRECTOMY      1998, donated to sister    TOE SURGERY Left     3/2023    TOE SURGERY Right     4/2024     Family History   Problem Relation Name Age of Onset    Other (mouth cancer) Sister       Social History     Tobacco Use    Smoking status: Former     Types: Cigarettes     Passive exposure: Current    Smokeless tobacco: Current   Vaping Use    Vaping status: Every Day    Substances: Nicotine    Devices: Disposable, Refillable tank   Substance Use Topics    Alcohol use: Not Currently     Comment: seldom beer    Drug use: Never        Physical Exam   ED Triage Vitals   Temperature Heart Rate Respirations BP   11/15/24 1444 11/15/24 1444 11/15/24 1444 11/15/24 1450   36.9 °C (98.4 °F) 69 18 141/86      Pulse Ox Temp src Heart Rate Source Patient Position   11/15/24 1444 -- -- --   98 %         BP Location FiO2 (%)     -- --             Physical Exam  Vitals and nursing note reviewed.   Constitutional:       General: She is not in acute distress.     Appearance: She is well-developed. She is not ill-appearing or toxic-appearing.   HENT:      Head: Normocephalic and atraumatic.   Eyes:      Extraocular Movements: Extraocular movements intact.      Pupils: Pupils are equal, round, and reactive to light.   Cardiovascular:      Rate and Rhythm: Normal rate and regular rhythm.      Pulses:           Radial pulses are 2+ on the right side and 2+ on the left side.      Heart sounds: Normal heart sounds. No murmur heard.     No friction rub. No gallop.   Pulmonary:      Effort: Pulmonary effort is normal.      Breath sounds: Normal breath sounds. No decreased breath sounds, wheezing, rhonchi or rales.   Musculoskeletal:         General: Normal range of motion.      Cervical back: Normal range of motion.      Right lower leg: No edema.      Left lower leg: No edema.   Skin:     General: Skin is warm and dry.   Neurological:      General: No focal deficit present.      Mental Status: She is alert and oriented to person, place, and time.   Psychiatric:         Mood and Affect: Mood normal.         Behavior: Behavior normal.           ED Course & MDM   ED Course as of 11/15/24 1709   Fri Nov 15, 2024   1511 EKG interpreted by me: Normal sinus rhythm, rate 68.  Leftward axis.  No significant ST or T wave abnormalities. [ML]      ED Course User Index  [ML] Eric Logan MD         Diagnoses as of 11/15/24 1709   Chest pain, unspecified type                 No data recorded     Marck Coma Scale Score: 15 (11/15/24 1443 : Yonathan Ye RN) HEART Score: 3  (11/15/24 1641 : Oneyda Barnard PA-C)                         Medical Decision Making  **Disclaimer parts of this chart have been completed using voice recognition software. Please excuse any errors of transcription.     Patient seen in conjunction with attending physician Dr. Logan    HPI: Detailed above.    Exam: A medically appropriate exam performed, outlined above, given the known history and presentation.    History obtained from: Patient    EKG: Reviewed and interpreted by my attending physician    Labs/Diagnostics:  Labs Reviewed   CBC WITH AUTO DIFFERENTIAL - Abnormal       Result Value    WBC 8.0      nRBC 0.0      RBC 3.20 (*)     Hemoglobin 9.6 (*)     Hematocrit 29.7 (*)     MCV 93      MCH 30.0      MCHC 32.3      RDW 20.1 (*)     Platelets 238      Neutrophils % 82.6      Immature Granulocytes %, Automated 0.9      Lymphocytes % 9.4      Monocytes % 6.3      Eosinophils % 0.5      Basophils % 0.3      Neutrophils Absolute 6.62      Immature Granulocytes Absolute, Automated 0.07      Lymphocytes Absolute 0.75 (*)     Monocytes Absolute 0.50      Eosinophils Absolute 0.04      Basophils Absolute 0.02     COMPREHENSIVE METABOLIC PANEL - Abnormal    Glucose 104 (*)     Sodium 127 (*)     Potassium 3.2 (*)     Chloride 90 (*)     Bicarbonate 26      Anion Gap 14      Urea Nitrogen 13      Creatinine 0.56      eGFR >90      Calcium 8.9      Albumin 3.4      Alkaline Phosphatase 459 (*)     Total Protein 7.0      AST 54 (*)     Bilirubin, Total 0.4      ALT 16     SERIAL TROPONIN-INITIAL - Normal    Troponin I, High Sensitivity 8      Narrative:     Less than 99th percentile of normal range cutoff-  Female and children under 18 years old <14 ng/L; Male <21 ng/L: Negative  Repeat testing should be performed if clinically indicated.     Female and children under 18 years old 14-50 ng/L; Male 21-50 ng/L:  Consistent with possible cardiac damage and possible increased clinical   risk. Serial measurements may  help to assess extent of myocardial damage.     >50 ng/L: Consistent with cardiac damage, increased clinical risk and  myocardial infarction. Serial measurements may help assess extent of   myocardial damage.      NOTE: Children less than 1 year old may have higher baseline troponin   levels and results should be interpreted in conjunction with the overall   clinical context.     NOTE: Troponin I testing is performed using a different   testing methodology at [facility] than at other   [facility]. Direct result comparisons should only   be made within the same method.   SERIAL TROPONIN, 1 HOUR - Normal    Troponin I, High Sensitivity 9      Narrative:     Less than 99th percentile of normal range cutoff-  Female and children under 18 years old <14 ng/L; Male <21 ng/L: Negative  Repeat testing should be performed if clinically indicated.     Female and children under 18 years old 14-50 ng/L; Male 21-50 ng/L:  Consistent with possible cardiac damage and possible increased clinical   risk. Serial measurements may help to assess extent of myocardial damage.     >50 ng/L: Consistent with cardiac damage, increased clinical risk and  myocardial infarction. Serial measurements may help assess extent of   myocardial damage.      NOTE: Children less than 1 year old may have higher baseline troponin   levels and results should be interpreted in conjunction with the overall   clinical context.     NOTE: Troponin I testing is performed using a different   testing methodology at [facility] than at other   [facility]. Direct result comparisons should only   be made within the same method.   TROPONIN SERIES- (INITIAL, 1 HR)    Narrative:     The following orders were created for panel order Troponin I Series, High Sensitivity (0, 1 HR).  Procedure                               Abnormality         Status                     ---------                               -----------         ------                      Troponin I, High Sensiti...[[id number]]  Normal              Final result               Troponin, High Sensitivi...[[id number]]  Normal              Final result                 Please view results for these tests on the individual orders.   MORPHOLOGY    RBC Morphology See Below      RBC Fragments Few      Ovalocytes Few      Teardrop Cells Few       CT angio chest for pulmonary embolism   Final Result   1.  There is no CT finding to suggest acute pulmonary   thromboembolism. See discussion above   2. Exam is not diagnostic for the presence or absence of aortic   dissection. No convincing evidence for acute aortic pathology on this   exam.             MACRO:   None        Signed by: Joseph Schoenberger 11/15/2024 4:28 PM   Dictation workstation:   [id number]        EMERGENCY DEPARTMENT COURSE and DIFFERENTIAL DIAGNOSIS/MDM:  Patient is a 69-year-old female presenting to the emergency department for evaluation of chest pain.  On physical exam vital signs remarkable for hypertension but otherwise stable patient is in no acute distress.  Patient asymptomatic at this time.  Cardiac workup initiated.  Initial troponin 8 and repeat troponin 9.  CMP showed potassium of 3.2 and a sodium of 127 as well as chloride of 90.  CBC remarkable for anemia but no leukocytosis.  CT angio the chest showed no evidence of pulmonary embolism or dissection.  Patient's heart score at this time is a 3.  Low suspicion for ACS/CAD.  Feel at this time given patient's heart score being lower that she can be discharged in stable condition with cardiology follow-up.  She was therefore given a cardiology referral and discharge.  She will return to the emergency department with any new or worsening symptoms.    Emergent pathologies were considered for this patient, although I have low suspicion for anything acutely emergent given patient's clinical presentation, history, physical exam, stable vital signs, and relatively unremarkable  "workup.  Discharging patient home is reasonable plan of care for outpatient management.     All labs, imaging, and diagnostic studies were reviewed by me and patient was counseled on clinical impression, expectations, and plan.  Patient was educated to follow-up with PCP in the following 1-2 days.  All questions from patient were answered. They elicited understanding and were agreeable to course of treatment.  Patient was discharged in stable condition and given strict return precautions.    I utilized an evidence-based risk rating tool (CMT) along with my training and experience to weigh the risk of discharge against the risks of further testing, imaging, or hospitalization. At this time I estimate the risks of additional testing, imaging, or hospitalization to be equal to or greater than the risk of discharge. I discussed my risk assessment with the patient and the patient consents to the risk of discharge as well as the risk of uncertainty in estimating outcomes.    The patient's HEART Score is <4. In rare cases, I give patients with HEART Score of 4 the option of discharge, but only when they meet criteria for \"Low 4,\" meaning that HST was used, and the 4 is not from a highly suspicious story, highly suspicious EKG, or positive cardiac enzymes. In these selected cases, the risk of a \"Low 4\" is still most likely lower than the risk of admission and further testing/imaging. [id number]          Vitals:    Vitals:    11/15/24 1444 11/15/24 1450 11/15/24 1630   BP:  141/86    Pulse: 69  66   Resp: 18  (!) 24   Temp: 36.9 °C (98.4 °F)     SpO2: 98%  98%   Weight: 64.9 kg (143 lb)     Height: 1.676 m (5' 6\")       History Limited by:    None    Independent history obtained from:    None    External records reviewed:    None    Diagnostics interpreted by me:    CT Scan(s) see MDM    Discussions with other clinicians:    None    Chronic conditions impacting care:    Cancer    Social determinants of health " affecting care:    None    Diagnostic tests considered but not performed: None    ED Medications managed:    Medications   iohexol (OMNIPaque) 350 mg iodine/mL solution 75 mL (75 mL intravenous Given 11/15/24 1550)       Prescription drugs considered:    None    Screenings:     HEART Score: 3          Procedure  Procedures     Oneyda Barnard PA-C  11/15/24 5461

## 2024-11-15 NOTE — ED PROVIDER NOTES
Supervisory note:  Patient seen in conjunction with SORAYA James.  Patient presents after an episode of chest pain.  She was at her oncology clinic and was preparing to have her chemotherapy disconnected when she developed chest pain.  Her chest pain lasted for about 45 minutes and has since completely resolved.  Nothing like this is happened previously.  She has cancer in her colon metastatic to her liver.  She is also treated for hypertension and hyperlipidemia.  She vapes but does not smoke.  On examination, heart and lung sounds are unremarkable.  There is no pretibial edema.    In setting of active cancer, CTA obtained to evaluate for PE.  This was negative.  Cardiac enzymes x 2 unremarkable.  Patient is felt to be at low risk for major adverse cardiac event in the next 6 weeks by heart score.  Aortic dissection, esophageal perforation, pneumothorax will to be very unlikely.  Patient advised to follow-up with primary care physician and given referral for cardiology.  Return precautions given for any worsening symptoms.  I personally saw the patient and made/approved the management plan and take responsiblity for the patient management.  Parts of this chart were completed with dictation software, please excuse any errors in transcription.     Eric Logan MD  11/15/24 0399

## 2024-11-15 NOTE — PROGRESS NOTES
Pt here for pump disconnect. While intake was being completed patient began complaining of heaviness across her chest that just started. Pt denied sob or anxiety. Pt denied it radiating. VSS. Pt assisted to reclining chair, pump disconnected, port flushed and brisk blood return noted, supplemental o2 applied and pt given emesis bag for her nausea. Pt was assessed by Coby Hernandez CNP. Instructed to call 911. Pt left accessed for future treatment. Pt left facility via stretcher with EMS at 1413.

## 2024-11-18 LAB
ATRIAL RATE: 68 BPM
P AXIS: -52 DEGREES
P OFFSET: 204 MS
P ONSET: 150 MS
PR INTERVAL: 144 MS
Q ONSET: 222 MS
QRS COUNT: 11 BEATS
QRS DURATION: 78 MS
QT INTERVAL: 396 MS
QTC CALCULATION(BAZETT): 421 MS
QTC FREDERICIA: 413 MS
R AXIS: -60 DEGREES
T AXIS: -66 DEGREES
T OFFSET: 420 MS
VENTRICULAR RATE: 68 BPM

## 2024-11-26 ENCOUNTER — APPOINTMENT (OUTPATIENT)
Dept: LAB | Facility: CLINIC | Age: 69
End: 2024-11-26
Payer: COMMERCIAL

## 2024-11-26 ENCOUNTER — INFUSION (OUTPATIENT)
Dept: HEMATOLOGY/ONCOLOGY | Facility: CLINIC | Age: 69
End: 2024-11-26
Payer: COMMERCIAL

## 2024-11-26 DIAGNOSIS — C78.7 METASTATIC COLON CANCER TO LIVER (MULTI): ICD-10-CM

## 2024-11-26 DIAGNOSIS — C18.9 METASTATIC COLON CANCER TO LIVER (MULTI): ICD-10-CM

## 2024-11-26 DIAGNOSIS — C18.9 MALIGNANT NEOPLASM OF COLON, UNSPECIFIED PART OF COLON (MULTI): ICD-10-CM

## 2024-11-26 LAB
ALBUMIN SERPL BCP-MCNC: 3.4 G/DL (ref 3.4–5)
ALP SERPL-CCNC: 1002 U/L (ref 33–136)
ALT SERPL W P-5'-P-CCNC: 16 U/L (ref 7–45)
ANION GAP SERPL CALC-SCNC: 15 MMOL/L (ref 10–20)
AST SERPL W P-5'-P-CCNC: 67 U/L (ref 9–39)
BASOPHILS # BLD AUTO: 0.04 X10*3/UL (ref 0–0.1)
BASOPHILS NFR BLD AUTO: 0.4 %
BILIRUB SERPL-MCNC: 0.5 MG/DL (ref 0–1.2)
BUN SERPL-MCNC: 7 MG/DL (ref 6–23)
CALCIUM SERPL-MCNC: 9.1 MG/DL (ref 8.6–10.3)
CHLORIDE SERPL-SCNC: 89 MMOL/L (ref 98–107)
CO2 SERPL-SCNC: 26 MMOL/L (ref 21–32)
CREAT SERPL-MCNC: 0.59 MG/DL (ref 0.5–1.05)
EGFRCR SERPLBLD CKD-EPI 2021: >90 ML/MIN/1.73M*2
EOSINOPHIL # BLD AUTO: 0.02 X10*3/UL (ref 0–0.7)
EOSINOPHIL NFR BLD AUTO: 0.2 %
ERYTHROCYTE [DISTWIDTH] IN BLOOD BY AUTOMATED COUNT: 19.2 % (ref 11.5–14.5)
GLUCOSE SERPL-MCNC: 123 MG/DL (ref 74–99)
HCT VFR BLD AUTO: 29.3 % (ref 36–46)
HGB BLD-MCNC: 9.5 G/DL (ref 12–16)
IMM GRANULOCYTES # BLD AUTO: 0.05 X10*3/UL (ref 0–0.7)
IMM GRANULOCYTES NFR BLD AUTO: 0.5 % (ref 0–0.9)
LYMPHOCYTES # BLD AUTO: 0.69 X10*3/UL (ref 1.2–4.8)
LYMPHOCYTES NFR BLD AUTO: 6.6 %
MAGNESIUM SERPL-MCNC: 1.37 MG/DL (ref 1.6–2.4)
MCH RBC QN AUTO: 30.4 PG (ref 26–34)
MCHC RBC AUTO-ENTMCNC: 32.4 G/DL (ref 32–36)
MCV RBC AUTO: 94 FL (ref 80–100)
MONOCYTES # BLD AUTO: 1.13 X10*3/UL (ref 0.1–1)
MONOCYTES NFR BLD AUTO: 10.8 %
NEUTROPHILS # BLD AUTO: 8.55 X10*3/UL (ref 1.2–7.7)
NEUTROPHILS NFR BLD AUTO: 81.5 %
NRBC BLD-RTO: 0 /100 WBCS (ref 0–0)
PLATELET # BLD AUTO: 195 X10*3/UL (ref 150–450)
POTASSIUM SERPL-SCNC: 3.9 MMOL/L (ref 3.5–5.3)
PROT SERPL-MCNC: 7.3 G/DL (ref 6.4–8.2)
RBC # BLD AUTO: 3.12 X10*6/UL (ref 4–5.2)
SODIUM SERPL-SCNC: 126 MMOL/L (ref 136–145)
WBC # BLD AUTO: 10.5 X10*3/UL (ref 4.4–11.3)

## 2024-11-26 PROCEDURE — 36591 DRAW BLOOD OFF VENOUS DEVICE: CPT

## 2024-11-26 PROCEDURE — 85025 COMPLETE CBC W/AUTO DIFF WBC: CPT

## 2024-11-26 PROCEDURE — 2500000004 HC RX 250 GENERAL PHARMACY W/ HCPCS (ALT 636 FOR OP/ED): Performed by: STUDENT IN AN ORGANIZED HEALTH CARE EDUCATION/TRAINING PROGRAM

## 2024-11-26 PROCEDURE — 84075 ASSAY ALKALINE PHOSPHATASE: CPT

## 2024-11-26 PROCEDURE — 83735 ASSAY OF MAGNESIUM: CPT

## 2024-11-26 RX ORDER — HEPARIN SODIUM,PORCINE/PF 10 UNIT/ML
50 SYRINGE (ML) INTRAVENOUS AS NEEDED
Status: DISCONTINUED | OUTPATIENT
Start: 2024-11-26 | End: 2024-11-26 | Stop reason: HOSPADM

## 2024-11-26 RX ORDER — HEPARIN 100 UNIT/ML
500 SYRINGE INTRAVENOUS AS NEEDED
Status: DISCONTINUED | OUTPATIENT
Start: 2024-11-26 | End: 2024-11-26 | Stop reason: HOSPADM

## 2024-11-26 RX ORDER — HEPARIN 100 UNIT/ML
500 SYRINGE INTRAVENOUS AS NEEDED
Status: CANCELLED | OUTPATIENT
Start: 2024-11-26

## 2024-11-26 RX ORDER — HEPARIN SODIUM,PORCINE/PF 10 UNIT/ML
50 SYRINGE (ML) INTRAVENOUS AS NEEDED
Status: CANCELLED | OUTPATIENT
Start: 2024-11-26

## 2024-11-27 ENCOUNTER — HOSPITAL ENCOUNTER (OUTPATIENT)
Dept: RADIOLOGY | Facility: CLINIC | Age: 69
Discharge: HOME | End: 2024-11-27
Payer: COMMERCIAL

## 2024-11-27 ENCOUNTER — TELEPHONE (OUTPATIENT)
Dept: HEMATOLOGY/ONCOLOGY | Facility: CLINIC | Age: 69
End: 2024-11-27

## 2024-11-27 ENCOUNTER — INFUSION (OUTPATIENT)
Dept: HEMATOLOGY/ONCOLOGY | Facility: CLINIC | Age: 69
End: 2024-11-27
Payer: COMMERCIAL

## 2024-11-27 ENCOUNTER — OFFICE VISIT (OUTPATIENT)
Dept: HEMATOLOGY/ONCOLOGY | Facility: CLINIC | Age: 69
End: 2024-11-27
Payer: COMMERCIAL

## 2024-11-27 VITALS
BODY MASS INDEX: 23.75 KG/M2 | TEMPERATURE: 98.8 F | OXYGEN SATURATION: 97 % | SYSTOLIC BLOOD PRESSURE: 130 MMHG | WEIGHT: 147.16 LBS | RESPIRATION RATE: 18 BRPM | DIASTOLIC BLOOD PRESSURE: 81 MMHG | HEART RATE: 93 BPM

## 2024-11-27 VITALS
WEIGHT: 147.16 LBS | DIASTOLIC BLOOD PRESSURE: 81 MMHG | SYSTOLIC BLOOD PRESSURE: 130 MMHG | HEART RATE: 93 BPM | OXYGEN SATURATION: 97 % | RESPIRATION RATE: 18 BRPM | BODY MASS INDEX: 23.75 KG/M2 | TEMPERATURE: 98.8 F

## 2024-11-27 DIAGNOSIS — C78.7 METASTATIC COLON CANCER TO LIVER (MULTI): ICD-10-CM

## 2024-11-27 DIAGNOSIS — C18.9 METASTATIC COLON CANCER TO LIVER (MULTI): ICD-10-CM

## 2024-11-27 DIAGNOSIS — R74.8 ELEVATED ALKALINE PHOSPHATASE LEVEL: ICD-10-CM

## 2024-11-27 DIAGNOSIS — C18.9 MALIGNANT NEOPLASM OF COLON, UNSPECIFIED PART OF COLON (MULTI): ICD-10-CM

## 2024-11-27 DIAGNOSIS — C78.7 METASTATIC COLON CANCER TO LIVER (MULTI): Primary | ICD-10-CM

## 2024-11-27 DIAGNOSIS — C18.9 METASTATIC COLON CANCER TO LIVER (MULTI): Primary | ICD-10-CM

## 2024-11-27 DIAGNOSIS — R53.83 FATIGUE, UNSPECIFIED TYPE: ICD-10-CM

## 2024-11-27 LAB — CORTIS SERPL-MCNC: 39.6 UG/DL (ref 2.5–20)

## 2024-11-27 PROCEDURE — 99214 OFFICE O/P EST MOD 30 MIN: CPT | Mod: 25 | Performed by: STUDENT IN AN ORGANIZED HEALTH CARE EDUCATION/TRAINING PROGRAM

## 2024-11-27 PROCEDURE — 82977 ASSAY OF GGT: CPT

## 2024-11-27 PROCEDURE — 86704 HEP B CORE ANTIBODY TOTAL: CPT

## 2024-11-27 PROCEDURE — 1159F MED LIST DOCD IN RCRD: CPT | Performed by: STUDENT IN AN ORGANIZED HEALTH CARE EDUCATION/TRAINING PROGRAM

## 2024-11-27 PROCEDURE — 87340 HEPATITIS B SURFACE AG IA: CPT

## 2024-11-27 PROCEDURE — 86706 HEP B SURFACE ANTIBODY: CPT

## 2024-11-27 PROCEDURE — 2500000004 HC RX 250 GENERAL PHARMACY W/ HCPCS (ALT 636 FOR OP/ED): Performed by: STUDENT IN AN ORGANIZED HEALTH CARE EDUCATION/TRAINING PROGRAM

## 2024-11-27 PROCEDURE — 96365 THER/PROPH/DIAG IV INF INIT: CPT | Mod: INF

## 2024-11-27 PROCEDURE — 1125F AMNT PAIN NOTED PAIN PRSNT: CPT | Performed by: STUDENT IN AN ORGANIZED HEALTH CARE EDUCATION/TRAINING PROGRAM

## 2024-11-27 PROCEDURE — 76705 ECHO EXAM OF ABDOMEN: CPT | Performed by: RADIOLOGY

## 2024-11-27 PROCEDURE — 76705 ECHO EXAM OF ABDOMEN: CPT

## 2024-11-27 PROCEDURE — 96366 THER/PROPH/DIAG IV INF ADDON: CPT | Mod: INF

## 2024-11-27 PROCEDURE — 82533 TOTAL CORTISOL: CPT

## 2024-11-27 PROCEDURE — 99214 OFFICE O/P EST MOD 30 MIN: CPT | Performed by: STUDENT IN AN ORGANIZED HEALTH CARE EDUCATION/TRAINING PROGRAM

## 2024-11-27 RX ORDER — HEPARIN 100 UNIT/ML
500 SYRINGE INTRAVENOUS AS NEEDED
Status: CANCELLED | OUTPATIENT
Start: 2024-11-27

## 2024-11-27 RX ORDER — HEPARIN 100 UNIT/ML
500 SYRINGE INTRAVENOUS AS NEEDED
Status: DISCONTINUED | OUTPATIENT
Start: 2024-11-27 | End: 2024-11-27 | Stop reason: HOSPADM

## 2024-11-27 RX ORDER — HEPARIN SODIUM,PORCINE/PF 10 UNIT/ML
50 SYRINGE (ML) INTRAVENOUS AS NEEDED
Status: CANCELLED | OUTPATIENT
Start: 2024-11-27

## 2024-11-27 RX ORDER — HEPARIN SODIUM,PORCINE/PF 10 UNIT/ML
50 SYRINGE (ML) INTRAVENOUS AS NEEDED
Status: DISCONTINUED | OUTPATIENT
Start: 2024-11-27 | End: 2024-11-27 | Stop reason: HOSPADM

## 2024-11-27 ASSESSMENT — PAIN SCALES - GENERAL
PAINLEVEL_OUTOF10: 4
PAINLEVEL_OUTOF10: 4

## 2024-11-27 NOTE — TELEPHONE ENCOUNTER
Called and couldn't get a hold of Ms. Dodd. Called her cousin, Tyler, that she lives with and who was present at her appt today.    RUQ US shows some slight improvement in gallbladder wall thickening. Liver mass appears a little smaller as well.    I have added antimitochondrial antibody testing testing to her lab draw from this morning. I will also be reaching out to hepatology for input.     Stressed importance to Tyler to take patient to ED over holiday weekend if she feels worse.         Felipa Hu MD  Staff, Gastrointestinal Medical Oncology  [facility]

## 2024-11-27 NOTE — PROGRESS NOTES
"Patient seen in infusion center, accompanied by her cousin Estela. Medications and allergies reviewed.     Reports feeling exhausted.   Reports dizziness, lightheadedness, and chills.   Reports having sweats at home this morning.   Stated \"My liver is kicking, it feels like it did when this first started, I feel like I broke a rib\" Reports jabbing pain when she takes a deep breath or moves to quickly.     Labs reviewed with Elana and Estela by Dr. Hu.     Plan: reschedule MRI            Ultrasound of RUQ           Cortisol level to be drawn today            IV Mag           NS bolus today    Follow up in 2 weeks.                "

## 2024-11-27 NOTE — PROGRESS NOTES
Patient here for chemo. Accessed mediport with good blood return. Dr. Hu to see patient. (At bedside)  Per Dr. Hu, no chemo treatment today, ultrasound orders, and fluids with mag replacement. Updated patient on plan of care, denies questions at this time.  Per Dr. Hu, instructed patient to cut back on oral intake of water to six bottles a day (down from 12 that patient admits to drinking a day). Patient verbalized understanding of treatment and repeat blood draw on Friday.

## 2024-11-27 NOTE — PROGRESS NOTES
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     She was previously diagnosed and treated for her breast and colon cancers in [place].      6/23/2020 - PET/CT - hypermetabolic mass in sigmoid colon & hypermet mass in R breast      7/6/2020 - sigmoid colon resection - pathology: Moderately differentiated invasive adenocarcinoma, 5.5 cm in diameter. Invades through muscularis propria focally into subserosal tissue. Marked adhesion of small bowel segments to colon without involvement of malignancy, +0/28 LN, negative margins, small focus of carcinoma in the serosa of margin furthest from this tumor (pT3 N0), at least stage IIA     8/12/2020 - BL mammogram & US: 3.6 cm mass in R breast     8/20/2020 - R breast biopsy - path: Invasive poorly differentiated ductal carcinoma. ER positive and NJ positive and HER-2 by IHC is 1+, but by HER-2 AMARIS is positive. On MammaPrint testing malignancy is ER and NJ positive  and HER-2 positive, high risk, with a predicted 5-year metastasis free survival of 93% with chemotherapy and hormonal therapy versus 71% metastasis free survival with hormonal therapy alone.      10/15/2020 - Completed pelvic radiation      11/5/2020 - R lumpectomy and R SLNBx - path: 2.6 cm invasive ductal carcinoma, with all margins free of malignancy. The malignancy comes to within 1 mm of the closest deep margin. Perineural invasion is present. There is a focus of metastatic carcinoma, measuring 0.5 mm in 1 of 2 sentinel lymph nodes. pT2 pN1mic. Stage IIB     1/8/2021 - CT CAP - RLQ colostomy in tact, tiny nodules LLL nonspecific     5/11/2021 - CT CAP - stable small pulm nodules, worsening ventral hernia, interval takedown of LLQ ostomy, no evidence of met disease      Transitioned her care to her PCP where she was being prescribed letrozole. Has not had repeat mammogram or colonoscopy.     4/10/24 - Re-established care with medical oncology with Dr. Riccardo Blake at [facility]. Had not been seen by medical oncology since August 2021. Letrozole had been refilled by PCP. CT CAP, colonoscopy, CEA, CA 19-9, CA 27.29 were all ordered.      6/18/24 - CT A/P w contrast - large hepatic mass (7.5x8.7cm) involving both anterior segment of R lobe as well as medial segment of L lobe likely related to metastatic disease possibly colon cancer however met breast CA not excluded w hx; enlarged mohit hepatic LN likely metastatic, probably stable sidebranch IPMN and in pancreas   CT chest non con - stable tiny nodule superolateral LLL (3mm)     6/24/24 - Dr. Blake discussed imaging. Biopsy ordered.      7/1/24 - CT guided biopsy of liver lesion, path showed adenocarcinoma, metastatic from patient's known colonic primary, IHC +CK20, CDX2, SATB2 while negative for GATA3 and CK7, pMMR     1018-10/20/24 - Admitted to [facility] for RUQ pain with concern for cholecystitis. Surgical oncology consulted: less likely cholecystitis with no plan for  "cholecystectomy or percutaneous cholecystotomy tube. She was started empirically on Zosyn pending cultures. Blood cultures negative. Oncology consulted, who believed the pain was from tumor necrosis around the liver. Patient feeling better with less perspiration. She was discharged on ciprofloxacin and metronidazole.     Current Treatment:    7/24 - supposed to start FOLFOX. Had issues with skin open over port line.    7/31/24 - present: FOLFOX + panitumumab (panitumumab added starting C3)     C4 - Stopped panitumumab per patient request as infusion time too long    Interval History:    Ms. Dodd is here today for follow-up with Tyler. She had to be taken by EMS to the hospital last pump disconnect 2 weeks ago after presenting with chest pain. Cardiac work-up was negative, CTPE negative.     She reports feeling \"exhausted\" today and barely able to move or do anything. This has been getting worse and doesn't seem to be related to timing of chemo. She is constantly cold. She is very thirsty all the time. Drinking 10-12 20mL water bottles daily (an entire case). Feels her mouth is always dry. No nausea or diarrhea. Sometimes has sweats. Not measuring temperature at home because she and Tyler don't know how to use the thermometer so they're going to buy a different one.    She was not able to get MRI brain which was previously scheduled due to having to go to ED.    ROS:  10-pt ROS reviewed and negative except as mentioned above.     PMHx / FHx / PSHx: reviewed and are accurate    Medications: below was reviewed and is accurate    Current Outpatient Medications:     allopurinol (Zyloprim) 100 mg tablet, Take 2 tablets (200 mg) by mouth 2 times a day., Disp: , Rfl:     amLODIPine (Norvasc) 5 mg tablet, Take 1 tablet (5 mg) by mouth once daily., Disp: , Rfl:     atorvastatin (Lipitor) 20 mg tablet, Take 1 tablet (20 mg) by mouth once daily., Disp: , Rfl:     cholecalciferol (Vitamin D-3) 25 MCG (1000 UT) capsule, Take " 1 capsule (25 mcg) by mouth once daily., Disp: , Rfl:     DULoxetine (Cymbalta) 60 mg DR capsule, Take 1 capsule (60 mg) by mouth once daily. Do not crush or chew., Disp: 30 capsule, Rfl: 5    leflunomide (Arava) 10 mg tablet, Take 1 tablet (10 mg) by mouth once daily., Disp: , Rfl:     letrozole (Femara) 2.5 mg tablet, Take 1 tablet (2.5 mg total) by mouth once daily.  Take with or without food., Disp: , Rfl:     LORazepam (Ativan) 0.5 mg tablet, Take 1 tablet (0.5 mg) by mouth every 8 hours if needed for anxiety., Disp: 90 tablet, Rfl: 3    omeprazole (PriLOSEC) 40 mg DR capsule, Take 1 capsule (40 mg) by mouth once daily. Do not crush or chew., Disp: , Rfl:     ondansetron (Zofran) 8 mg tablet, Take 1 tablet (8 mg) by mouth every 8 hours if needed for nausea or vomiting., Disp: 30 tablet, Rfl: 5    oxyCODONE (Roxicodone) 15 mg immediate release tablet, Take 1 tablet (15 mg) by mouth every 6 hours if needed for severe pain (7 - 10)., Disp: 120 tablet, Rfl: 0    potassium chloride CR (Klor-Con) 10 mEq ER tablet, Take 1 tablet (10 mEq) by mouth once daily. Do not crush, chew, or split., Disp: 30 tablet, Rfl: 11    prochlorperazine (Compazine) 10 mg tablet, Take 1 tablet (10 mg) by mouth every 6 hours if needed for nausea or vomiting., Disp: 30 tablet, Rfl: 5    acetylcysteine (NAC) 600 mg capsule capsule, Take by mouth. (Patient not taking: Reported on 11/27/2024), Disp: , Rfl:     minocycline 100 mg capsule, Take 1 capsule (100 mg) by mouth once every 24 hours. For rash prevention (Patient not taking: Reported on 10/30/2024), Disp: 30 capsule, Rfl: 2  No current facility-administered medications for this visit.    Facility-Administered Medications Ordered in Other Visits:     alteplase (Cathflo Activase) injection 2 mg, 2 mg, intra-catheter, PRN, Felipa Hu, MD    heparin flush 10 unit/mL syringe 50 Units, 50 Units, intra-catheter, PRFelipa MINA MD    heparin flush 100 unit/mL syringe 500 Units, 500  "Units, intra-catheter, PRN, Felipa Hu MD    sodium chloride 0.9% 1,000 mL with magnesium sulfate 2 g infusion, 500 mL/hr, intravenous, Continuous, Felipa Hu MD, Last Rate: 500 mL/hr at 24 0919, 500 mL/hr at 24 09    Physical exam:  Vitals:    24 0828   BP: 130/81   BP Location: Left arm   Patient Position: Sitting   Pulse: 93   Resp: 18   Temp: 37.1 °C (98.8 °F)   TempSrc: Temporal   SpO2: 97%   Weight: 66.7 kg (147 lb 2.5 oz)   ECO  GEN: NAD, appears anxious  HN: EOMI    LUNGS: normal respiratory effort  EXT: No deformities or edema  HEME: No signs of easy bruising or active bleeding.  PSYCH: Appropriate mood and affect    Laboratory review:    24 labs reviewed     ASSESSMENT AND PLAN:     Ms. Elana Dodd is a 68 yo F with PMH of HTN, RA (on leflunomide), HLD, GERD, s/p R nephrectomy (, donated kidney to sister), R breast cancer (20, ER+/WI+/HER2 1+ by IHC/AMARIS+) s/p R lumpectomy and R SLN (2020) followed by carboplatin/Taxotere/Herceptin x4C (stopped due to SE) and R RT (completed 2021) on letrozole (since 2021), and stage IIA colon cancer s/p laparoscopic sigmoid colectomy and colostomy and en bloc resection with appendectomy and L ureterolysis (2020) with subsequent pelvic RT (completed 10/15/2020) lost to follow-up found to have recurrent metastatic colon cancer to the liver here today for follow-up.     Ms. Dodd is feeling worse today. Unclear what is causing this level of fatigue. Timing doesn't correspond to chemo and continues even when not on chemo (including longer breaks from chemo). She has quite limited metastatic disease in the liver, so I do not think this explains how she is feeling either.     Metastatic colon cancer to the liver: Not feeling well. Hold treatment and obtain repeat RUQ US for abdominal pain and rising ALP (1000).   Abdominal pain: intermittent, \"stabbing\", random; RUQ  Elevated alkaline phosphatase: RUQ " US  Fatigue: Significant. Unclear etiology. Fatigue is constant even when she took a break from treatment for a month after the passing of her sister.  TSH normal. Cortisol drawn this AM.  Hyponatremia: Drinking 10-12 20mL bottles of water daily. Told her to drink no more than 6. IV NS today + magnesium. Repeat CMP and Mag on Friday.  Reported imbalance: Neuro exam last visit was completely normal including her ability to walk straight and cerebellar exam. Motor 5/5 in all extremities. No CN deficits either. I'm not sure why she is bumping into walls at home and this did not occur while in clinic. Will obtain MRI brain to make sure no metastatic disease (hx of breast cancer too). Scheduled 12/4.  Anxiety: Severe. Hydroxyzine 20mg PRN and lorazepam 0.5mg PRN. Previously declined escitalopram but changed her mind. Started on escitalopram which has helped her anxiety. Dr. Miller switched her to duloxetine.   Nausea: Olanzapine nightly. Zofran and Compazine as needed.   Hypomagnesemia: IV Mag today in infusion  Anemia: iron IV infusions (completed 2)      RTC in 2 weeks for C7 FOLFOX- delaying 2 weeks while awaiting work-up.    Felipa Hu MD  Staff, Gastrointestinal Medical Oncology  [facility]      I spent a total of >30 minutes on the date of the service which included preparing to see the patient, face-to-face patient care, completing clinical documentation, obtaining and/or reviewing separately obtained history, performing a medically appropriate examination, counseling and educating the patient/family/caregiver, and ordering medications, tests, or procedures.

## 2024-11-28 LAB
GGT SERPL-CCNC: 366 U/L (ref 5–55)
HBV CORE AB SER QL: REACTIVE
HBV SURFACE AB SER-ACNC: 100.8 MIU/ML
HBV SURFACE AG SERPL QL IA: NONREACTIVE

## 2024-11-29 ENCOUNTER — INFUSION (OUTPATIENT)
Dept: HEMATOLOGY/ONCOLOGY | Facility: CLINIC | Age: 69
End: 2024-11-29
Payer: COMMERCIAL

## 2024-11-29 ENCOUNTER — TELEPHONE (OUTPATIENT)
Dept: HEMATOLOGY/ONCOLOGY | Facility: CLINIC | Age: 69
End: 2024-11-29

## 2024-11-29 ENCOUNTER — TELEPHONE (OUTPATIENT)
Dept: GASTROENTEROLOGY | Facility: HOSPITAL | Age: 69
End: 2024-11-29
Payer: COMMERCIAL

## 2024-11-29 DIAGNOSIS — E87.6 HYPOKALEMIA: ICD-10-CM

## 2024-11-29 DIAGNOSIS — C78.7 METASTATIC COLON CANCER TO LIVER (MULTI): ICD-10-CM

## 2024-11-29 DIAGNOSIS — C18.9 METASTATIC COLON CANCER TO LIVER (MULTI): ICD-10-CM

## 2024-11-29 DIAGNOSIS — R74.8 ELEVATED ALKALINE PHOSPHATASE LEVEL: ICD-10-CM

## 2024-11-29 DIAGNOSIS — C18.9 MALIGNANT NEOPLASM OF COLON, UNSPECIFIED PART OF COLON (MULTI): ICD-10-CM

## 2024-11-29 DIAGNOSIS — E83.42 HYPOMAGNESEMIA: Primary | ICD-10-CM

## 2024-11-29 LAB
ALBUMIN SERPL BCP-MCNC: 3.5 G/DL (ref 3.4–5)
ALP SERPL-CCNC: 979 U/L (ref 33–136)
ALT SERPL W P-5'-P-CCNC: 17 U/L (ref 7–45)
ANION GAP SERPL CALC-SCNC: 16 MMOL/L (ref 10–20)
AST SERPL W P-5'-P-CCNC: 63 U/L (ref 9–39)
BILIRUB SERPL-MCNC: 0.5 MG/DL (ref 0–1.2)
BUN SERPL-MCNC: 8 MG/DL (ref 6–23)
CALCIUM SERPL-MCNC: 9 MG/DL (ref 8.6–10.3)
CHLORIDE SERPL-SCNC: 90 MMOL/L (ref 98–107)
CO2 SERPL-SCNC: 26 MMOL/L (ref 21–32)
CREAT SERPL-MCNC: 0.71 MG/DL (ref 0.5–1.05)
EGFRCR SERPLBLD CKD-EPI 2021: >90 ML/MIN/1.73M*2
GLUCOSE SERPL-MCNC: 124 MG/DL (ref 74–99)
MAGNESIUM SERPL-MCNC: 1.38 MG/DL (ref 1.6–2.4)
POTASSIUM SERPL-SCNC: 3.3 MMOL/L (ref 3.5–5.3)
PROT SERPL-MCNC: 7.3 G/DL (ref 6.4–8.2)
SODIUM SERPL-SCNC: 129 MMOL/L (ref 136–145)

## 2024-11-29 PROCEDURE — 2500000004 HC RX 250 GENERAL PHARMACY W/ HCPCS (ALT 636 FOR OP/ED): Performed by: STUDENT IN AN ORGANIZED HEALTH CARE EDUCATION/TRAINING PROGRAM

## 2024-11-29 PROCEDURE — 83735 ASSAY OF MAGNESIUM: CPT

## 2024-11-29 PROCEDURE — 36591 DRAW BLOOD OFF VENOUS DEVICE: CPT

## 2024-11-29 PROCEDURE — 80053 COMPREHEN METABOLIC PANEL: CPT

## 2024-11-29 RX ORDER — HEPARIN SODIUM,PORCINE/PF 10 UNIT/ML
50 SYRINGE (ML) INTRAVENOUS AS NEEDED
OUTPATIENT
Start: 2024-11-29

## 2024-11-29 RX ORDER — HEPARIN SODIUM,PORCINE/PF 10 UNIT/ML
50 SYRINGE (ML) INTRAVENOUS AS NEEDED
Status: DISCONTINUED | OUTPATIENT
Start: 2024-11-29 | End: 2024-11-29 | Stop reason: HOSPADM

## 2024-11-29 RX ORDER — HEPARIN 100 UNIT/ML
500 SYRINGE INTRAVENOUS AS NEEDED
Status: DISCONTINUED | OUTPATIENT
Start: 2024-11-29 | End: 2024-11-29 | Stop reason: HOSPADM

## 2024-11-29 RX ORDER — MAGNESIUM CHLORIDE 64 MG
128 TABLET, DELAYED RELEASE (ENTERIC COATED) ORAL 2 TIMES DAILY
Qty: 28 TABLET | Refills: 0 | Status: SHIPPED | OUTPATIENT
Start: 2024-11-29 | End: 2024-12-06

## 2024-11-29 RX ORDER — HEPARIN 100 UNIT/ML
500 SYRINGE INTRAVENOUS AS NEEDED
OUTPATIENT
Start: 2024-11-29

## 2024-11-29 NOTE — RESULT ENCOUNTER NOTE
Cortisol was high.  This is probably not causing her symptoms.  If it were the problem, it would be low.

## 2024-11-29 NOTE — TELEPHONE ENCOUNTER
RN made a follow up call to the patient to advise that Thursday will still be fine to get repeat labs and that hepatology has also attempted to reach her to set up an appointment. Patient repeated back education and plan from earlier today, as well as verbalized that she would check her voicemail and follow up to schedule her hepatology appointment.     ----- Message from Nurse Gabby MANN sent at 11/29/2024  1:33 PM EST -----  I contacted the patient, reviewed the results and new orders. Patient verbalized understanding and agreement to the plan.   She states that her pharmacy will not have the magnesium rx in until Monday and wants to know if that changes when she should get her repeat labs done.  Please advise and I will follow up with her.  ----- Message -----  From: Felipa Hu MD  Sent: 11/29/2024   1:00 PM EST  To: [facility] Gi Disease Team    Please call patient regarding labs. Please let her know sodium is better but not normal yet. (She should not be drinking 10-12 20mL water bottles/day which I discussed with her Wed) K is a little low, continue daily dosing 10mEq that she already has. Her Mg is low despite IV Mg Wednesday.    I sent 7 days of 2 pills twice daily Rx for magnesium delayed release which should help prevent diarrhea to the pharmacy.    She needs to  Rx and start today/this weekend and have repeat CMP and Mg drawn next Thursday.    If Ms Dodd doesn't answer, please call her cousin Tyler. Her number is listed.  ----- Message -----  From: Lab, Background User  Sent: 11/29/2024  12:41 PM EST  To: Felipa Hu MD

## 2024-11-29 NOTE — PROGRESS NOTES
Dr. Walls notified of ......  11/29/24 11:48  GLUCOSE: 124 (H)  SODIUM: 129 (L)  POTASSIUM: 3.3 (L)  CHLORIDE: 90 (L)  Bicarbonate: 26  Anion Gap: 16  Blood Urea Nitrogen: 8  Creatinine: 0.71  EGFR: >90  Calcium: 9.0  Albumin: 3.5  Alkaline Phosphatase: 979 (H)  ALT: 17  AST: 63 (H)  Bilirubin Total: 0.5  Total Protein: 7.3  MAGNESIUM: 1.38 (L)    Aware pt left after lab draw today, no new orders received

## 2024-12-03 LAB — MITOCHONDRIA AB SER QL IF: NEGATIVE

## 2024-12-04 ENCOUNTER — TELEPHONE (OUTPATIENT)
Dept: HEMATOLOGY/ONCOLOGY | Facility: CLINIC | Age: 69
End: 2024-12-04
Payer: COMMERCIAL

## 2024-12-04 DIAGNOSIS — C80.1 ANXIETY ASSOCIATED WITH CANCER DIAGNOSIS (MULTI): ICD-10-CM

## 2024-12-04 DIAGNOSIS — F41.1 ANXIETY ASSOCIATED WITH CANCER DIAGNOSIS (MULTI): ICD-10-CM

## 2024-12-04 DIAGNOSIS — G89.3 CANCER RELATED PAIN: ICD-10-CM

## 2024-12-04 RX ORDER — LORAZEPAM 0.5 MG/1
0.5 TABLET ORAL EVERY 8 HOURS PRN
Qty: 90 TABLET | Refills: 3 | Status: SHIPPED | OUTPATIENT
Start: 2024-12-04 | End: 2025-04-03

## 2024-12-04 RX ORDER — OXYCODONE HYDROCHLORIDE 15 MG/1
15 TABLET ORAL EVERY 6 HOURS PRN
Qty: 120 TABLET | Refills: 0 | Status: SHIPPED | OUTPATIENT
Start: 2024-12-04 | End: 2025-01-03

## 2024-12-04 NOTE — TELEPHONE ENCOUNTER
[facility] report reviewed and reflects  prescription history, no aberrancy noted. Per O[facility], patient last filled Oxycodone 15mg #120/30 & Ativan 0.5mg #90/30 on 11/8/24, due for refills on 12/7/24. Per last visit with Gege Medellin CNP on 11/8/24 patient to continue Oxycodone 15mg Q6H PRN & Ativan 0.5mg TID PRN. Patient with follow up visit scheduled with Gege Medellin CNP on 12/23/24. Patient updated that medication will be sent to [facility] via . Refill request routed to provider.

## 2024-12-10 ENCOUNTER — INFUSION (OUTPATIENT)
Dept: HEMATOLOGY/ONCOLOGY | Facility: CLINIC | Age: 69
End: 2024-12-10
Payer: COMMERCIAL

## 2024-12-10 ENCOUNTER — TELEPHONE (OUTPATIENT)
Dept: HEMATOLOGY/ONCOLOGY | Facility: CLINIC | Age: 69
End: 2024-12-10

## 2024-12-10 DIAGNOSIS — C18.9 METASTATIC COLON CANCER TO LIVER (MULTI): ICD-10-CM

## 2024-12-10 DIAGNOSIS — C78.7 METASTATIC COLON CANCER TO LIVER (MULTI): ICD-10-CM

## 2024-12-10 DIAGNOSIS — C18.9 MALIGNANT NEOPLASM OF COLON, UNSPECIFIED PART OF COLON (MULTI): ICD-10-CM

## 2024-12-10 LAB
ALBUMIN SERPL BCP-MCNC: 3.2 G/DL (ref 3.4–5)
ALP SERPL-CCNC: 1347 U/L (ref 33–136)
ALT SERPL W P-5'-P-CCNC: 20 U/L (ref 7–45)
ANION GAP SERPL CALC-SCNC: 15 MMOL/L (ref 10–20)
AST SERPL W P-5'-P-CCNC: 78 U/L (ref 9–39)
BASOPHILS # BLD AUTO: 0.03 X10*3/UL (ref 0–0.1)
BASOPHILS NFR BLD AUTO: 0.3 %
BILIRUB SERPL-MCNC: 0.8 MG/DL (ref 0–1.2)
BUN SERPL-MCNC: 9 MG/DL (ref 6–23)
CALCIUM SERPL-MCNC: 9 MG/DL (ref 8.6–10.3)
CHLORIDE SERPL-SCNC: 89 MMOL/L (ref 98–107)
CO2 SERPL-SCNC: 25 MMOL/L (ref 21–32)
CREAT SERPL-MCNC: 0.56 MG/DL (ref 0.5–1.05)
EGFRCR SERPLBLD CKD-EPI 2021: >90 ML/MIN/1.73M*2
EOSINOPHIL # BLD AUTO: 0.02 X10*3/UL (ref 0–0.7)
EOSINOPHIL NFR BLD AUTO: 0.2 %
ERYTHROCYTE [DISTWIDTH] IN BLOOD BY AUTOMATED COUNT: 18 % (ref 11.5–14.5)
GLUCOSE SERPL-MCNC: 118 MG/DL (ref 74–99)
HCT VFR BLD AUTO: 29.1 % (ref 36–46)
HGB BLD-MCNC: 9.4 G/DL (ref 12–16)
IMM GRANULOCYTES # BLD AUTO: 0.08 X10*3/UL (ref 0–0.7)
IMM GRANULOCYTES NFR BLD AUTO: 0.7 % (ref 0–0.9)
LYMPHOCYTES # BLD AUTO: 0.59 X10*3/UL (ref 1.2–4.8)
LYMPHOCYTES NFR BLD AUTO: 5.4 %
MAGNESIUM SERPL-MCNC: 1.18 MG/DL (ref 1.6–2.4)
MCH RBC QN AUTO: 29.6 PG (ref 26–34)
MCHC RBC AUTO-ENTMCNC: 32.3 G/DL (ref 32–36)
MCV RBC AUTO: 92 FL (ref 80–100)
MONOCYTES # BLD AUTO: 1.53 X10*3/UL (ref 0.1–1)
MONOCYTES NFR BLD AUTO: 14.1 %
NEUTROPHILS # BLD AUTO: 8.61 X10*3/UL (ref 1.2–7.7)
NEUTROPHILS NFR BLD AUTO: 79.3 %
NRBC BLD-RTO: 0 /100 WBCS (ref 0–0)
PLATELET # BLD AUTO: 233 X10*3/UL (ref 150–450)
POTASSIUM SERPL-SCNC: 3.6 MMOL/L (ref 3.5–5.3)
PROT SERPL-MCNC: 7 G/DL (ref 6.4–8.2)
RBC # BLD AUTO: 3.18 X10*6/UL (ref 4–5.2)
SODIUM SERPL-SCNC: 125 MMOL/L (ref 136–145)
WBC # BLD AUTO: 10.9 X10*3/UL (ref 4.4–11.3)

## 2024-12-10 PROCEDURE — 2500000004 HC RX 250 GENERAL PHARMACY W/ HCPCS (ALT 636 FOR OP/ED): Performed by: STUDENT IN AN ORGANIZED HEALTH CARE EDUCATION/TRAINING PROGRAM

## 2024-12-10 PROCEDURE — 83735 ASSAY OF MAGNESIUM: CPT

## 2024-12-10 PROCEDURE — 36591 DRAW BLOOD OFF VENOUS DEVICE: CPT

## 2024-12-10 PROCEDURE — 84075 ASSAY ALKALINE PHOSPHATASE: CPT

## 2024-12-10 PROCEDURE — 85025 COMPLETE CBC W/AUTO DIFF WBC: CPT

## 2024-12-10 RX ORDER — HEPARIN 100 UNIT/ML
500 SYRINGE INTRAVENOUS AS NEEDED
Status: CANCELLED | OUTPATIENT
Start: 2024-12-10

## 2024-12-10 RX ORDER — HEPARIN SODIUM,PORCINE/PF 10 UNIT/ML
50 SYRINGE (ML) INTRAVENOUS AS NEEDED
Status: CANCELLED | OUTPATIENT
Start: 2024-12-10

## 2024-12-10 RX ORDER — HEPARIN SODIUM,PORCINE/PF 10 UNIT/ML
50 SYRINGE (ML) INTRAVENOUS AS NEEDED
Status: DISCONTINUED | OUTPATIENT
Start: 2024-12-10 | End: 2024-12-10 | Stop reason: HOSPADM

## 2024-12-10 RX ORDER — HEPARIN 100 UNIT/ML
500 SYRINGE INTRAVENOUS AS NEEDED
Status: DISCONTINUED | OUTPATIENT
Start: 2024-12-10 | End: 2024-12-10 | Stop reason: HOSPADM

## 2024-12-10 NOTE — TELEPHONE ENCOUNTER
Called and left voicemail for patient to return my call. Was calling patient per Dr. Hu to inform her she needs to call the hepatology office back to set up appointment as her liver function tests are abnormal.

## 2024-12-10 NOTE — PROGRESS NOTES
Patient here for blood draw from [facility]. Accessed, good blood return, sent blood to lab, flushed per orders. Denies questions or needs at this time.

## 2024-12-11 ENCOUNTER — OFFICE VISIT (OUTPATIENT)
Dept: HEMATOLOGY/ONCOLOGY | Facility: CLINIC | Age: 69
End: 2024-12-11
Payer: COMMERCIAL

## 2024-12-11 ENCOUNTER — TELEPHONE (OUTPATIENT)
Dept: HEMATOLOGY/ONCOLOGY | Facility: CLINIC | Age: 69
End: 2024-12-11
Payer: COMMERCIAL

## 2024-12-11 ENCOUNTER — INFUSION (OUTPATIENT)
Dept: HEMATOLOGY/ONCOLOGY | Facility: CLINIC | Age: 69
End: 2024-12-11
Payer: COMMERCIAL

## 2024-12-11 VITALS
OXYGEN SATURATION: 100 % | DIASTOLIC BLOOD PRESSURE: 91 MMHG | WEIGHT: 140.32 LBS | RESPIRATION RATE: 17 BRPM | TEMPERATURE: 97.5 F | HEART RATE: 88 BPM | SYSTOLIC BLOOD PRESSURE: 138 MMHG | BODY MASS INDEX: 22.65 KG/M2

## 2024-12-11 DIAGNOSIS — C18.9 METASTATIC COLON CANCER TO LIVER (MULTI): ICD-10-CM

## 2024-12-11 DIAGNOSIS — C18.9 MALIGNANT NEOPLASM OF COLON, UNSPECIFIED PART OF COLON (MULTI): ICD-10-CM

## 2024-12-11 DIAGNOSIS — E83.42 HYPOMAGNESEMIA: ICD-10-CM

## 2024-12-11 DIAGNOSIS — C78.7 METASTATIC COLON CANCER TO LIVER (MULTI): ICD-10-CM

## 2024-12-11 DIAGNOSIS — R10.11 RIGHT UPPER QUADRANT ABDOMINAL PAIN: ICD-10-CM

## 2024-12-11 DIAGNOSIS — C18.9 MALIGNANT NEOPLASM OF COLON, UNSPECIFIED PART OF COLON (MULTI): Primary | ICD-10-CM

## 2024-12-11 PROCEDURE — 99214 OFFICE O/P EST MOD 30 MIN: CPT | Performed by: STUDENT IN AN ORGANIZED HEALTH CARE EDUCATION/TRAINING PROGRAM

## 2024-12-11 PROCEDURE — 1125F AMNT PAIN NOTED PAIN PRSNT: CPT | Performed by: STUDENT IN AN ORGANIZED HEALTH CARE EDUCATION/TRAINING PROGRAM

## 2024-12-11 PROCEDURE — 96365 THER/PROPH/DIAG IV INF INIT: CPT | Mod: INF

## 2024-12-11 PROCEDURE — 1159F MED LIST DOCD IN RCRD: CPT | Performed by: STUDENT IN AN ORGANIZED HEALTH CARE EDUCATION/TRAINING PROGRAM

## 2024-12-11 PROCEDURE — 2500000004 HC RX 250 GENERAL PHARMACY W/ HCPCS (ALT 636 FOR OP/ED): Performed by: STUDENT IN AN ORGANIZED HEALTH CARE EDUCATION/TRAINING PROGRAM

## 2024-12-11 RX ORDER — MAGNESIUM SULFATE HEPTAHYDRATE 40 MG/ML
2 INJECTION, SOLUTION INTRAVENOUS ONCE AS NEEDED
OUTPATIENT
Start: 2024-12-11

## 2024-12-11 RX ORDER — HEPARIN SODIUM,PORCINE/PF 10 UNIT/ML
50 SYRINGE (ML) INTRAVENOUS AS NEEDED
Status: CANCELLED | OUTPATIENT
Start: 2024-12-11

## 2024-12-11 RX ORDER — MAGNESIUM CHLORIDE 64 MG
128 TABLET, DELAYED RELEASE (ENTERIC COATED) ORAL 2 TIMES DAILY
Qty: 56 TABLET | Refills: 0 | Status: SHIPPED | OUTPATIENT
Start: 2024-12-11 | End: 2024-12-25

## 2024-12-11 RX ORDER — MAGNESIUM SULFATE HEPTAHYDRATE 40 MG/ML
2 INJECTION, SOLUTION INTRAVENOUS ONCE
Status: COMPLETED | OUTPATIENT
Start: 2024-12-11 | End: 2024-12-11

## 2024-12-11 RX ORDER — MAGNESIUM SULFATE HEPTAHYDRATE 40 MG/ML
4 INJECTION, SOLUTION INTRAVENOUS ONCE AS NEEDED
OUTPATIENT
Start: 2024-12-11

## 2024-12-11 RX ORDER — HEPARIN 100 UNIT/ML
500 SYRINGE INTRAVENOUS AS NEEDED
Status: DISCONTINUED | OUTPATIENT
Start: 2024-12-11 | End: 2024-12-11 | Stop reason: HOSPADM

## 2024-12-11 RX ORDER — HEPARIN 100 UNIT/ML
500 SYRINGE INTRAVENOUS AS NEEDED
Status: CANCELLED | OUTPATIENT
Start: 2024-12-11

## 2024-12-11 RX ORDER — HEPARIN SODIUM,PORCINE/PF 10 UNIT/ML
50 SYRINGE (ML) INTRAVENOUS AS NEEDED
Status: DISCONTINUED | OUTPATIENT
Start: 2024-12-11 | End: 2024-12-11 | Stop reason: HOSPADM

## 2024-12-11 ASSESSMENT — PAIN SCALES - GENERAL: PAINLEVEL_OUTOF10: 9

## 2024-12-11 NOTE — PROGRESS NOTES
Patient came over from seeing Dr. Hu, no treatment today, will replace magnesium.  Reviewed schedule, denies questions. Emotional support given.
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
## 2024-12-11 NOTE — PROGRESS NOTES
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 Elana Dodd is a 68 yo F with PMH of HTN, RA (on leflunomide), HLD, GERD, s/p R nephrectomy (1998, donated kidney to sister), R breast cancer (8/20/20, ER+/ID+/HER2 1+ by IHC/AMARIS+) s/p R lumpectomy and R SLN (11/5/2020) followed by carboplatin/Taxotere/Herceptin x4C (stopped due to SE) and R RT (completed 7/2021) on letrozole (since 7/2021), and stage IIA colon cancer s/p laparoscopic sigmoid colectomy and colostomy and en bloc resection with appendectomy and L ureterolysis (7/6/2020) with subsequent pelvic RT (completed 10/15/2020) lost to follow-up found to have recurrent metastatic colon cancer to the liver.     She was previously diagnosed and treated for her breast and colon cancers in [place].      6/23/2020 - PET/CT - hypermetabolic mass in sigmoid colon & hypermet mass in R breast      7/6/2020 - sigmoid colon resection - pathology: Moderately differentiated invasive adenocarcinoma, 5.5 cm in diameter. Invades through muscularis propria focally into subserosal tissue. Marked adhesion of small bowel segments to colon without involvement of malignancy, +0/28 LN, negative margins, small focus of carcinoma in the serosa of margin furthest from this tumor (pT3 N0), at least stage IIA     8/12/2020 - BL mammogram & US: 3.6 cm mass in R breast     8/20/2020 - R breast biopsy - path: Invasive poorly differentiated ductal carcinoma. ER positive and ID positive and HER-2 by IHC is 1+, but by HER-2 AMARIS is positive. On MammaPrint testing malignancy is ER and ID positive  and HER-2 positive, high risk, with a predicted 5-year metastasis free survival of 93% with chemotherapy and hormonal therapy versus 71% metastasis free survival with hormonal therapy alone.      10/15/2020 - Completed pelvic radiation      11/5/2020 - R lumpectomy and R SLNBx - path: 2.6 cm invasive ductal carcinoma, with all margins free of malignancy. The malignancy comes to within 1 mm of the closest deep margin. Perineural invasion is present. There is a focus of metastatic carcinoma, measuring 0.5 mm in 1 of 2 sentinel lymph nodes. pT2 pN1mic. Stage IIB     1/8/2021 - CT CAP - RLQ colostomy in tact, tiny nodules LLL nonspecific     5/11/2021 - CT CAP - stable small pulm nodules, worsening ventral hernia, interval takedown of LLQ ostomy, no evidence of met disease      Transitioned her care to her PCP where she was being prescribed letrozole. Has not had repeat mammogram or colonoscopy.     4/10/24 - Re-established care with medical oncology with Dr. Riccardo Blake at [facility]. Had not been seen by medical oncology since August 2021. Letrozole had been refilled by PCP. CT CAP, colonoscopy, CEA, CA 19-9, CA 27.29 were all ordered.      6/18/24 - CT A/P w contrast - large hepatic mass (7.5x8.7cm) involving both anterior segment of R lobe as well as medial segment of L lobe likely related to metastatic disease possibly colon cancer however met breast CA not excluded w hx; enlarged mohit hepatic LN likely metastatic, probably stable sidebranch IPMN and in pancreas   CT chest non con - stable tiny nodule superolateral LLL (3mm)     6/24/24 - Dr. Blake discussed imaging. Biopsy ordered.      7/1/24 - CT guided biopsy of liver lesion, path showed adenocarcinoma, metastatic from patient's known colonic primary, IHC +CK20, CDX2, SATB2 while negative for GATA3 and CK7, pMMR     1018-10/20/24 - Admitted to [facility] [place] RUQ pain with concern for cholecystitis. Surgical oncology consulted: less likely cholecystitis with no plan for  cholecystectomy or percutaneous cholecystotomy tube. She was started empirically on Zosyn pending cultures. Blood cultures negative. Oncology consulted, who believed the pain was from tumor necrosis around the liver. Patient feeling better with less perspiration. She was discharged on ciprofloxacin and metronidazole.     Current Treatment:    7/24 - supposed to start FOLFOX. Had issues with skin open over port line.    7/31/24 - present: FOLFOX + panitumumab (panitumumab added starting C3)     C4 - Stopped panitumumab per patient request as infusion time too long    Interval History:    Ms. Dodd is here today for follow-up with her daughter. Since she was last seen, she did not call hepatology to make an appointment and did not go to her MRI brain appointment. She also did not have updated CMP and Mg drawn last Thursday.    She feels terrible today and exhausted. Doesn't like all the food Tyler makes because there is a lot of beef that she doesn't like to eat. Eating a lot of fruit which she enjoys.     ROS:  10-pt ROS reviewed and negative except as mentioned above.     PMHx / FHx / PSHx: reviewed and are accurate    Medications: below was reviewed and is accurate    Current Outpatient Medications:     allopurinol (Zyloprim) 100 mg tablet, Take 2 tablets (200 mg) by mouth 2 times a day., Disp: , Rfl:     amLODIPine (Norvasc) 5 mg tablet, Take 1 tablet (5 mg) by mouth once daily., Disp: , Rfl:     atorvastatin (Lipitor) 20 mg tablet, Take 1 tablet (20 mg) by mouth once daily., Disp: , Rfl:     cholecalciferol (Vitamin D-3) 25 MCG (1000 UT) capsule, Take 1 capsule (25 mcg) by mouth once daily., Disp: , Rfl:     DULoxetine (Cymbalta) 60 mg DR capsule, Take 1 capsule (60 mg) by mouth once daily. Do not crush or chew., Disp: 30 capsule, Rfl: 5    leflunomide (Arava) 10 mg tablet, Take 1 tablet (10 mg) by mouth once daily., Disp: , Rfl:     letrozole (Femara) 2.5 mg tablet, Take 1 tablet (2.5 mg total) by mouth once  daily.  Take with or without food., Disp: , Rfl:     LORazepam (Ativan) 0.5 mg tablet, Take 1 tablet (0.5 mg) by mouth every 8 hours if needed for anxiety., Disp: 90 tablet, Rfl: 3    omeprazole (PriLOSEC) 40 mg DR capsule, Take 1 capsule (40 mg) by mouth once daily. Do not crush or chew., Disp: , Rfl:     ondansetron (Zofran) 8 mg tablet, Take 1 tablet (8 mg) by mouth every 8 hours if needed for nausea or vomiting., Disp: 30 tablet, Rfl: 5    oxyCODONE (Roxicodone) 15 mg immediate release tablet, Take 1 tablet (15 mg) by mouth every 6 hours if needed (pain)., Disp: 120 tablet, Rfl: 0    potassium chloride CR (Klor-Con) 10 mEq ER tablet, Take 1 tablet (10 mEq) by mouth once daily. Do not crush, chew, or split., Disp: 30 tablet, Rfl: 11    prochlorperazine (Compazine) 10 mg tablet, Take 1 tablet (10 mg) by mouth every 6 hours if needed for nausea or vomiting., Disp: 30 tablet, Rfl: 5    acetylcysteine (NAC) 600 mg capsule capsule, Take by mouth. (Patient not taking: Reported on 12/11/2024), Disp: , Rfl:     magnesium chloride (MagDelay) 64 mg EC tablet, Take 2 tablets (128 mg) by mouth 2 times a day for 14 days., Disp: 56 tablet, Rfl: 0    minocycline 100 mg capsule, Take 1 capsule (100 mg) by mouth once every 24 hours. For rash prevention (Patient not taking: Reported on 12/11/2024), Disp: 30 capsule, Rfl: 2  No current facility-administered medications for this visit.    Facility-Administered Medications Ordered in Other Visits:     alteplase (Cathflo Activase) injection 2 mg, 2 mg, intra-catheter, PRN, Felipa Hu MD    heparin flush 10 unit/mL syringe 50 Units, 50 Units, intra-catheter, PRN, Felipa Hu MD    heparin flush 100 unit/mL syringe 500 Units, 500 Units, intra-catheter, PRN, Felipa Hu MD    Physical exam:  Vitals:    12/11/24 0913   BP: (!) 138/91   BP Location: Right arm   Patient Position: Sitting   BP Cuff Size: Adult   Pulse: 88   Resp: 17   Temp: 36.4 °C (97.5 °F)   TempSrc:  Temporal   SpO2: 100%   Weight: 63.6 kg (140 lb 5.2 oz)   ECO  GEN: NAD, appears anxious  HN: EOMI    LUNGS: normal respiratory effort  EXT: No deformities or edema  HEME: No signs of easy bruising or active bleeding.  PSYCH: Appropriate mood and affect    Laboratory review:    12/10/24 labs reviewed     ASSESSMENT AND PLAN:     Ms. Elana Dodd is a 68 yo F with PMH of HTN, RA (on leflunomide), HLD, GERD, s/p R nephrectomy (, donated kidney to sister), R breast cancer (20, ER+/ND+/HER2 1+ by IHC/AMARIS+) s/p R lumpectomy and R SLN (2020) followed by carboplatin/Taxotere/Herceptin x4C (stopped due to SE) and R RT (completed 2021) on letrozole (since 2021), and stage IIA colon cancer s/p laparoscopic sigmoid colectomy and colostomy and en bloc resection with appendectomy and L ureterolysis (2020) with subsequent pelvic RT (completed 10/15/2020) lost to follow-up found to have recurrent metastatic colon cancer to the liver here today for follow-up.     Ms. Dodd is feeling worse today. Unclear what is causing this level of fatigue. Timing doesn't correspond to chemo and continues even when not on chemo (including longer breaks from chemo). Last chemo was 24 and RUQ US end of November showed interval slight decrease in size of liver lesion.    I had an extensive discussion with Ms. Dodd and her daughter today regarding goals of care. I shared with her that all work-up thus far has been unrevealing as to why she feels so terrible. I reiterated that her abnormal liver labs are out of proportion of what I'd expect from chemo or what is seen on RUQ which shows some response of liver lesion. She has not had updated full scans in a few months. ALP can be elevated with bone metastases. I recommended she have updated CT CAP as well as MRI brain due to her bumping into things at home and being severely thirsty (hyponatremia, SIADH?). MRI brain was scheduled last week, but she was not able to make  the appointment. I again requested she call hepatology back for an appointment. Number was provided.    I also offered a referral to hospice and explained the role of hospice is to focus on symptom management. She was interested in an introductory meeting. Referral to hospice placed. She shared that she is tired of coming to appointments.    IV magnesium today in infusion for hypomagnesemia noted on labs.     CT CAP scheduled for tomorrow afternoon and MRI brain scheduled for Sunday.   RTC in 1 week to review scans     Felipa Hu MD  Staff, Gastrointestinal Medical Oncology  [facility]      I spent a total of >30 minutes on the date of the service which included preparing to see the patient, face-to-face patient care, completing clinical documentation, obtaining and/or reviewing separately obtained history, performing a medically appropriate examination, counseling and educating the patient/family/caregiver, and ordering medications, tests, or procedures.

## 2024-12-12 ENCOUNTER — INFUSION (OUTPATIENT)
Dept: HEMATOLOGY/ONCOLOGY | Facility: CLINIC | Age: 69
End: 2024-12-12
Payer: COMMERCIAL

## 2024-12-12 ENCOUNTER — SOCIAL WORK (OUTPATIENT)
Dept: CASE MANAGEMENT | Facility: HOSPITAL | Age: 69
End: 2024-12-12
Payer: COMMERCIAL

## 2024-12-12 ENCOUNTER — APPOINTMENT (OUTPATIENT)
Dept: RADIOLOGY | Facility: CLINIC | Age: 69
End: 2024-12-12
Payer: COMMERCIAL

## 2024-12-12 DIAGNOSIS — C18.9 METASTATIC COLON CANCER TO LIVER (MULTI): ICD-10-CM

## 2024-12-12 DIAGNOSIS — C78.7 METASTATIC COLON CANCER TO LIVER (MULTI): ICD-10-CM

## 2024-12-12 RX ORDER — HEPARIN SODIUM,PORCINE/PF 10 UNIT/ML
50 SYRINGE (ML) INTRAVENOUS AS NEEDED
Status: ACTIVE | OUTPATIENT
Start: 2024-12-12

## 2024-12-12 RX ORDER — HEPARIN SODIUM,PORCINE/PF 10 UNIT/ML
50 SYRINGE (ML) INTRAVENOUS AS NEEDED
OUTPATIENT
Start: 2024-12-12

## 2024-12-12 RX ORDER — HEPARIN 100 UNIT/ML
500 SYRINGE INTRAVENOUS AS NEEDED
Status: ACTIVE | OUTPATIENT
Start: 2024-12-12

## 2024-12-12 RX ORDER — HEPARIN 100 UNIT/ML
500 SYRINGE INTRAVENOUS AS NEEDED
OUTPATIENT
Start: 2024-12-12

## 2024-12-12 NOTE — PROGRESS NOTES
Sw received a message that a palliative care referral was place to [facility] yesterday. CAROLINE called today to verify that [facility] received all the information necessary. They have and will be making an appt with Pt soon.

## 2024-12-13 ENCOUNTER — APPOINTMENT (OUTPATIENT)
Dept: HEMATOLOGY/ONCOLOGY | Facility: CLINIC | Age: 69
End: 2024-12-13
Payer: COMMERCIAL

## 2024-12-15 ENCOUNTER — HOSPITAL ENCOUNTER (OUTPATIENT)
Dept: RADIOLOGY | Facility: HOSPITAL | Age: 69
Discharge: HOME | End: 2024-12-15
Payer: COMMERCIAL

## 2024-12-15 DIAGNOSIS — C78.7 METASTATIC COLON CANCER TO LIVER (MULTI): ICD-10-CM

## 2024-12-15 DIAGNOSIS — C18.9 METASTATIC COLON CANCER TO LIVER (MULTI): ICD-10-CM

## 2024-12-15 PROCEDURE — 2550000001 HC RX 255 CONTRASTS: Performed by: STUDENT IN AN ORGANIZED HEALTH CARE EDUCATION/TRAINING PROGRAM

## 2024-12-15 PROCEDURE — 70553 MRI BRAIN STEM W/O & W/DYE: CPT

## 2024-12-15 PROCEDURE — 70553 MRI BRAIN STEM W/O & W/DYE: CPT | Performed by: RADIOLOGY

## 2024-12-15 PROCEDURE — A9575 INJ GADOTERATE MEGLUMI 0.1ML: HCPCS | Performed by: STUDENT IN AN ORGANIZED HEALTH CARE EDUCATION/TRAINING PROGRAM

## 2024-12-15 RX ORDER — GADOTERATE MEGLUMINE 376.9 MG/ML
13 INJECTION INTRAVENOUS
Status: COMPLETED | OUTPATIENT
Start: 2024-12-15 | End: 2024-12-15

## 2024-12-16 ENCOUNTER — TELEPHONE (OUTPATIENT)
Dept: HEMATOLOGY/ONCOLOGY | Facility: HOSPITAL | Age: 69
End: 2024-12-16
Payer: COMMERCIAL

## 2024-12-16 ENCOUNTER — HOSPITAL ENCOUNTER (OUTPATIENT)
Dept: RADIOLOGY | Facility: HOSPITAL | Age: 69
Discharge: HOME | End: 2024-12-16
Payer: COMMERCIAL

## 2024-12-16 DIAGNOSIS — C78.7 METASTATIC COLON CANCER TO LIVER (MULTI): ICD-10-CM

## 2024-12-16 DIAGNOSIS — R10.11 RIGHT UPPER QUADRANT ABDOMINAL PAIN: ICD-10-CM

## 2024-12-16 DIAGNOSIS — C18.9 MALIGNANT NEOPLASM OF COLON, UNSPECIFIED PART OF COLON (MULTI): ICD-10-CM

## 2024-12-16 DIAGNOSIS — C18.9 METASTATIC COLON CANCER TO LIVER (MULTI): ICD-10-CM

## 2024-12-16 PROCEDURE — 74177 CT ABD & PELVIS W/CONTRAST: CPT | Performed by: RADIOLOGY

## 2024-12-16 PROCEDURE — 2550000001 HC RX 255 CONTRASTS: Performed by: STUDENT IN AN ORGANIZED HEALTH CARE EDUCATION/TRAINING PROGRAM

## 2024-12-16 PROCEDURE — 74177 CT ABD & PELVIS W/CONTRAST: CPT

## 2024-12-17 NOTE — TELEPHONE ENCOUNTER
Contacted patient to inform her that appt tomorrow has been switched to [facility].   She was very grateful and verbalized understanding/agreement.

## 2024-12-18 ENCOUNTER — TELEMEDICINE (OUTPATIENT)
Dept: HEMATOLOGY/ONCOLOGY | Facility: CLINIC | Age: 69
End: 2024-12-18
Payer: COMMERCIAL

## 2024-12-18 DIAGNOSIS — C18.9 MALIGNANT NEOPLASM OF COLON, UNSPECIFIED PART OF COLON (MULTI): ICD-10-CM

## 2024-12-18 DIAGNOSIS — C18.9 METASTATIC COLON CANCER TO LIVER (MULTI): ICD-10-CM

## 2024-12-18 DIAGNOSIS — C78.7 METASTATIC COLON CANCER TO LIVER (MULTI): ICD-10-CM

## 2024-12-18 PROCEDURE — 1125F AMNT PAIN NOTED PAIN PRSNT: CPT | Performed by: STUDENT IN AN ORGANIZED HEALTH CARE EDUCATION/TRAINING PROGRAM

## 2024-12-18 PROCEDURE — 99213 OFFICE O/P EST LOW 20 MIN: CPT | Performed by: STUDENT IN AN ORGANIZED HEALTH CARE EDUCATION/TRAINING PROGRAM

## 2024-12-18 ASSESSMENT — PAIN SCALES - GENERAL: PAINLEVEL_OUTOF10: 10-WORST PAIN EVER

## 2024-12-18 NOTE — PROGRESS NOTES
Virtual or Telephone Consent    A telephone visit (audio only) between the patient (at the originating site) and the provider (at the distant site) was utilized to provide this telehealth service.   Verbal consent was requested and obtained from Elana Dodd on this date, 12/18/24 for a telehealth visit.     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 Elana Dodd is a 70 yo F with PMH of HTN, RA (on leflunomide), HLD, GERD, s/p R nephrectomy (1998, donated kidney to sister), R breast cancer (8/20/20, ER+/AR+/HER2 1+ by IHC/AMARIS+) s/p R lumpectomy and R SLN (11/5/2020) followed by carboplatin/Taxotere/Herceptin x4C (stopped due to SE) and R RT (completed 7/2021) on letrozole (since 7/2021), and stage IIA colon cancer s/p laparoscopic sigmoid colectomy and colostomy and en bloc resection with appendectomy and L ureterolysis (7/6/2020) with subsequent pelvic RT (completed 10/15/2020) lost to follow-up found to have recurrent metastatic colon cancer to the liver.     She was previously diagnosed and treated for her breast and colon cancers in [place].      6/23/2020 - PET/CT - hypermetabolic mass in sigmoid colon & hypermet mass in R breast      7/6/2020 - sigmoid colon resection - pathology: Moderately differentiated invasive adenocarcinoma, 5.5 cm in diameter. Invades through muscularis propria focally into subserosal tissue. Marked adhesion of small bowel segments to colon without involvement of malignancy, +0/28 LN, negative margins, small focus of carcinoma in the serosa of margin furthest from this tumor  (pT3 N0), at least stage IIA     8/12/2020 - BL mammogram & US: 3.6 cm mass in R breast     8/20/2020 - R breast biopsy - path: Invasive poorly differentiated ductal carcinoma. ER positive and SD positive and HER-2 by IHC is 1+, but by HER-2 AMARIS is positive. On MammaPrint testing malignancy is ER and SD positive and HER-2 positive, high risk, with a predicted 5-year metastasis free survival of 93% with chemotherapy and hormonal therapy versus 71% metastasis free survival with hormonal therapy alone.      10/15/2020 - Completed pelvic radiation      11/5/2020 - R lumpectomy and R SLNBx - path: 2.6 cm invasive ductal carcinoma, with all margins free of malignancy. The malignancy comes to within 1 mm of the closest deep margin. Perineural invasion is present. There is a focus of metastatic carcinoma, measuring 0.5 mm in 1 of 2 sentinel lymph nodes. pT2 pN1mic. Stage IIB     1/8/2021 - CT CAP - RLQ colostomy in tact, tiny nodules LLL nonspecific     5/11/2021 - CT CAP - stable small pulm nodules, worsening ventral hernia, interval takedown of LLQ ostomy, no evidence of met disease      Transitioned her care to her PCP where she was being prescribed letrozole. Has not had repeat mammogram or colonoscopy.     4/10/24 - Re-established care with medical oncology with Dr. Riccardo Blake at [facility]. Had not been seen by medical oncology since August 2021. Letrozole had been refilled by PCP. CT CAP, colonoscopy, CEA, CA 19-9, CA 27.29 were all ordered.      6/18/24 - CT A/P w contrast - large hepatic mass (7.5x8.7cm) involving both anterior segment of R lobe as well as medial segment of L lobe likely related to metastatic disease possibly colon cancer however met breast CA not excluded w hx; enlarged mohit hepatic LN likely metastatic, probably stable sidebranch IPMN and in pancreas   CT chest non con - stable tiny nodule superolateral LLL (3mm)     6/24/24 - Dr. Blake discussed imaging. Biopsy ordered.      7/1/24 - CT guided  biopsy of liver lesion, path showed adenocarcinoma, metastatic from patient's known colonic primary, IHC +CK20, CDX2, SATB2 while negative for GATA3 and CK7, pMMR     1018-10/20/24 - Admitted to [facility] for RUQ pain with concern for cholecystitis. Surgical oncology consulted: less likely cholecystitis with no plan for cholecystectomy or percutaneous cholecystotomy tube. She was started empirically on Zosyn pending cultures. Blood cultures negative. Oncology consulted, who believed the pain was from tumor necrosis around the liver. Patient feeling better with less perspiration. She was discharged on ciprofloxacin and metronidazole.     Current Treatment:    7/24 - supposed to start FOLFOX. Had issues with skin open over port line.    7/31/24 - present: FOLFOX + panitumumab (panitumumab added starting C3)     C4 - Stopped panitumumab per patient request as infusion time too long    Interval History:    Spoke with Ms. Dodd on the phone for follow-up. She continues to feel unwell. She shared that she really doesn't want to keep coming to the office and having treatment or work-up done. She is very tired and just wants to be comfortable. She is meeting hospice after the holiday so that Nakia and Tyler can be present. She feels overwhelmed and is unsure what she wants to do.     ROS:  10-pt ROS reviewed and negative except as mentioned above.     PMHx / FHx / PSHx: reviewed and are accurate    Medications: below was reviewed and is accurate    Current Outpatient Medications:     acetylcysteine (NAC) 600 mg capsule capsule, Take by mouth. (Patient not taking: Reported on 12/11/2024), Disp: , Rfl:     allopurinol (Zyloprim) 100 mg tablet, Take 2 tablets (200 mg) by mouth 2 times a day., Disp: , Rfl:     amLODIPine (Norvasc) 5 mg tablet, Take 1 tablet (5 mg) by mouth once daily., Disp: , Rfl:     atorvastatin (Lipitor) 20 mg tablet, Take 1 tablet (20 mg) by mouth once daily., Disp: , Rfl:     cholecalciferol (Vitamin D-3) 25  MCG (1000 UT) capsule, Take 1 capsule (25 mcg) by mouth once daily., Disp: , Rfl:     DULoxetine (Cymbalta) 60 mg DR capsule, Take 1 capsule (60 mg) by mouth once daily. Do not crush or chew., Disp: 30 capsule, Rfl: 5    leflunomide (Arava) 10 mg tablet, Take 1 tablet (10 mg) by mouth once daily., Disp: , Rfl:     letrozole (Femara) 2.5 mg tablet, Take 1 tablet (2.5 mg total) by mouth once daily.  Take with or without food., Disp: , Rfl:     LORazepam (Ativan) 0.5 mg tablet, Take 1 tablet (0.5 mg) by mouth every 8 hours if needed for anxiety., Disp: 90 tablet, Rfl: 3    magnesium chloride (MagDelay) 64 mg EC tablet, Take 2 tablets (128 mg) by mouth 2 times a day for 14 days., Disp: 56 tablet, Rfl: 0    minocycline 100 mg capsule, Take 1 capsule (100 mg) by mouth once every 24 hours. For rash prevention (Patient not taking: Reported on 12/11/2024), Disp: 30 capsule, Rfl: 2    omeprazole (PriLOSEC) 40 mg DR capsule, Take 1 capsule (40 mg) by mouth once daily. Do not crush or chew., Disp: , Rfl:     ondansetron (Zofran) 8 mg tablet, Take 1 tablet (8 mg) by mouth every 8 hours if needed for nausea or vomiting., Disp: 30 tablet, Rfl: 5    oxyCODONE (Roxicodone) 15 mg immediate release tablet, Take 1 tablet (15 mg) by mouth every 6 hours if needed (pain)., Disp: 120 tablet, Rfl: 0    potassium chloride CR (Klor-Con) 10 mEq ER tablet, Take 1 tablet (10 mEq) by mouth once daily. Do not crush, chew, or split., Disp: 30 tablet, Rfl: 11    prochlorperazine (Compazine) 10 mg tablet, Take 1 tablet (10 mg) by mouth every 6 hours if needed for nausea or vomiting., Disp: 30 tablet, Rfl: 5  No current facility-administered medications for this visit.    Facility-Administered Medications Ordered in Other Visits:     alteplase (Cathflo Activase) injection 2 mg, 2 mg, intra-catheter, PRN, Felipa Hu MD    heparin flush 10 unit/mL syringe 50 Units, 50 Units, intra-catheter, PRN, Felipa Hu MD    heparin flush 100 unit/mL  syringe 500 Units, 500 Units, intra-catheter, PRN, Felipa Hu MD    Physical exam:  There were no vitals filed for this visit. - telephone visit     Radiology review:    CT AP 12/16/24    IMPRESSION:  1. History of stage II A colon adenocarcinoma. Status post sigmoid  resection with no recurrent tumor at the anastomotic site. There are  multiple entero-entero anastomosis in the left lower quadrant and  right side of the pelvis.  2. Hepatic metastatic disease with multiple hypodense masses. There  is a large infiltrative mass involving the right lobe of the liver.  There is partial calcification, likely a mucinous component. This has  increased in size. Hepatomegaly.  3. Status post right nephrectomy (donated kidney to her sister).  Compensatory hypertrophy left kidney.  4. Contracted gallbladder with a small gallstone. Small amount of  ascites around the liver. Fluid around the gallbladder which is  likely systemic rather than primary gallbladder pathology.  5. History of right breast carcinoma 08/20/2020. ER positive, AR  positive, HER2 1+. Status post right lumpectomy. Patient was treated  with carboplatin, Taxotere and Herceptin x4 cycles and right  radiation therapy. The right breast is not completely included but  there is prominence of the interstitial markings of the right breast  with thickening of the skin consistent with post treatment change.    ASSESSMENT AND PLAN:     Ms. Elana Dodd is a 70 yo F with PMH of HTN, RA (on leflunomide), HLD, GERD, s/p R nephrectomy (1998, donated kidney to sister), R breast cancer (8/20/20, ER+/AR+/HER2 1+ by IHC/AMARIS+) s/p R lumpectomy and R SLN (11/5/2020) followed by carboplatin/Taxotere/Herceptin x4C (stopped due to SE) and R RT (completed 7/2021) on letrozole (since 7/2021), and stage IIA colon cancer s/p laparoscopic sigmoid colectomy and colostomy and en bloc resection with appendectomy and L ureterolysis (7/6/2020) with subsequent pelvic RT (completed  10/15/2020) lost to follow-up found to have recurrent metastatic colon cancer to the liver here today for follow-up.     Reviewed updated CT AP with Ms. Dodd that unfortunately shows progressive disease of the metastatic disease in the liver.    Ms. Dodd continues to feel unwell which has been an issue regardless of whether she has been on chemotherapy or not. Last treatment was almost a month ago. She has intermittently been receiving treatment, so I shared with her that I cannot say whether or not her cancer has responded to chemo as the treatments received have been inconsistent with sometimes weeks in between.    Ms. Dodd shared that she is tired of coming to appointments and doesn't want any treatment but is also scared about what that would mean for the cancer. I discussed that the cancer will grow without treatment. We talked about what types of symptoms and complications that may occur when the cancer grows.    I recommended she meet with hospice as we discussed last week since I do think hospice best aligns with her wishes. She is in agreement with this and will be meeting with them after the holiday so that both Nakia and Tyler can be present.    She is still not fully sure of how she would like to proceed. We will talk in 2 weeks on the phone to revisit this conversation.       Felipa Hu MD  Staff, Gastrointestinal Medical Oncology  [facility]

## 2024-12-24 ENCOUNTER — TELEPHONE (OUTPATIENT)
Dept: HEMATOLOGY/ONCOLOGY | Facility: CLINIC | Age: 69
End: 2024-12-24
Payer: COMMERCIAL

## 2024-12-24 DIAGNOSIS — G89.3 CANCER RELATED PAIN: Primary | ICD-10-CM

## 2024-12-24 RX ORDER — OXYCODONE HYDROCHLORIDE 15 MG/1
15 TABLET ORAL EVERY 6 HOURS PRN
Qty: 80 TABLET | Refills: 0 | Status: SHIPPED | OUTPATIENT
Start: 2024-12-24 | End: 2025-01-23

## 2024-12-24 RX ORDER — MORPHINE SULFATE 15 MG/1
15 TABLET, FILM COATED, EXTENDED RELEASE ORAL 2 TIMES DAILY
Qty: 60 TABLET | Refills: 0 | Status: SHIPPED | OUTPATIENT
Start: 2024-12-24 | End: 2025-01-23

## 2024-12-24 NOTE — TELEPHONE ENCOUNTER
Call returned to Elana Olmos. She states she is taking Oxycodone 7 tabs per day, mostly 2 at a time. Pain has increased in her back and liver in recent weeks.  Call reviewed with provider who will add MS Contin 15mg BID.   Virtual FUV 1/14/25.

## 2024-12-30 ENCOUNTER — TELEMEDICINE (OUTPATIENT)
Dept: HEMATOLOGY/ONCOLOGY | Facility: CLINIC | Age: 69
End: 2024-12-30
Payer: COMMERCIAL

## 2024-12-30 ENCOUNTER — TELEPHONE (OUTPATIENT)
Dept: HEMATOLOGY/ONCOLOGY | Facility: CLINIC | Age: 69
End: 2024-12-30
Payer: COMMERCIAL

## 2024-12-30 DIAGNOSIS — C78.7 METASTATIC COLON CANCER TO LIVER (MULTI): ICD-10-CM

## 2024-12-30 DIAGNOSIS — C18.9 METASTATIC COLON CANCER TO LIVER (MULTI): ICD-10-CM

## 2024-12-30 NOTE — PROGRESS NOTES
"\"The number you have dialed has calling restrictions that have prevented completion of your call\"  "

## 2025-01-03 ENCOUNTER — TELEPHONE (OUTPATIENT)
Dept: HEMATOLOGY/ONCOLOGY | Facility: CLINIC | Age: 70
End: 2025-01-03
Payer: COMMERCIAL